# Patient Record
Sex: FEMALE | Race: WHITE | Employment: OTHER | URBAN - NONMETROPOLITAN AREA
[De-identification: names, ages, dates, MRNs, and addresses within clinical notes are randomized per-mention and may not be internally consistent; named-entity substitution may affect disease eponyms.]

---

## 2019-03-18 LAB
ALBUMIN SERPL-MCNC: 4 G/DL
ALP BLD-CCNC: 68 U/L
ALT SERPL-CCNC: 21 U/L
ANION GAP SERPL CALCULATED.3IONS-SCNC: ABNORMAL MMOL/L
AST SERPL-CCNC: 14 U/L
AVERAGE GLUCOSE: ABNORMAL
BASOPHILS ABSOLUTE: 2850 /ΜL
BASOPHILS RELATIVE PERCENT: 0 %
BILIRUB SERPL-MCNC: 0.5 MG/DL (ref 0.1–1.4)
BUN BLDV-MCNC: 22 MG/DL
CALCIUM SERPL-MCNC: 9.3 MG/DL
CHLORIDE BLD-SCNC: 100 MMOL/L
CHOLESTEROL, TOTAL: 184 MG/DL
CHOLESTEROL/HDL RATIO: 3.5
CO2: 35 MMOL/L
CREAT SERPL-MCNC: 0.8 MG/DL
EOSINOPHILS ABSOLUTE: 120 /ΜL
EOSINOPHILS RELATIVE PERCENT: 2 %
GFR CALCULATED: 77
GLUCOSE BLD-MCNC: 121 MG/DL
HBA1C MFR BLD: 5.9 %
HCT VFR BLD CALC: 38.1 % (ref 36–46)
HDLC SERPL-MCNC: 52 MG/DL (ref 35–70)
HEMOGLOBIN: 12.4 G/DL (ref 12–16)
LDL CHOLESTEROL CALCULATED: 160 MG/DL (ref 0–160)
LYMPHOCYTES ABSOLUTE: 1670 /ΜL
LYMPHOCYTES RELATIVE PERCENT: 34 %
MCH RBC QN AUTO: 120 PG
MCHC RBC AUTO-ENTMCNC: 10 G/DL
MCV RBC AUTO: 280 FL
MONOCYTES ABSOLUTE: 280 /ΜL
MONOCYTES RELATIVE PERCENT: 6 %
NEUTROPHILS ABSOLUTE: 2850 /ΜL
NEUTROPHILS RELATIVE PERCENT: 58 %
PLATELET # BLD: 1662 K/ΜL
PMV BLD AUTO: 10.4 FL
POTASSIUM SERPL-SCNC: 4.4 MMOL/L
RBC # BLD: 1670 10^6/ΜL
SODIUM BLD-SCNC: 141 MMOL/L
T4 FREE: 1.1
TOTAL PROTEIN: 7
TRIGL SERPL-MCNC: 147 MG/DL
TSH SERPL DL<=0.05 MIU/L-ACNC: 2.83 UIU/ML
VLDLC SERPL CALC-MCNC: 132 MG/DL
WBC # BLD: 4.9 10^3/ML

## 2019-07-08 ENCOUNTER — OFFICE VISIT (OUTPATIENT)
Dept: FAMILY MEDICINE CLINIC | Age: 64
End: 2019-07-08
Payer: MEDICARE

## 2019-07-08 VITALS
HEIGHT: 63 IN | OXYGEN SATURATION: 90 % | WEIGHT: 224 LBS | SYSTOLIC BLOOD PRESSURE: 136 MMHG | BODY MASS INDEX: 39.69 KG/M2 | DIASTOLIC BLOOD PRESSURE: 74 MMHG | HEART RATE: 94 BPM | RESPIRATION RATE: 16 BRPM

## 2019-07-08 DIAGNOSIS — E11.9 TYPE 2 DIABETES MELLITUS WITHOUT COMPLICATION, WITHOUT LONG-TERM CURRENT USE OF INSULIN (HCC): ICD-10-CM

## 2019-07-08 DIAGNOSIS — I10 ESSENTIAL HYPERTENSION: Primary | ICD-10-CM

## 2019-07-08 DIAGNOSIS — J42 CHRONIC BRONCHITIS, UNSPECIFIED CHRONIC BRONCHITIS TYPE (HCC): ICD-10-CM

## 2019-07-08 DIAGNOSIS — E78.5 HYPERLIPIDEMIA, UNSPECIFIED HYPERLIPIDEMIA TYPE: ICD-10-CM

## 2019-07-08 PROCEDURE — 99214 OFFICE O/P EST MOD 30 MIN: CPT | Performed by: FAMILY MEDICINE

## 2019-07-08 RX ORDER — TIOTROPIUM BROMIDE 18 UG/1
CAPSULE ORAL; RESPIRATORY (INHALATION)
Refills: 5 | COMMUNITY
Start: 2019-06-09 | End: 2019-10-07 | Stop reason: SDUPTHER

## 2019-07-08 RX ORDER — LISINOPRIL 20 MG/1
TABLET ORAL
Refills: 1 | COMMUNITY
Start: 2019-04-01 | End: 2019-10-07 | Stop reason: SDUPTHER

## 2019-07-08 RX ORDER — SIMVASTATIN 40 MG
TABLET ORAL
Refills: 1 | COMMUNITY
Start: 2019-04-01 | End: 2019-10-07 | Stop reason: SDUPTHER

## 2019-07-08 RX ORDER — HYDROCHLOROTHIAZIDE 12.5 MG/1
TABLET ORAL
Refills: 1 | COMMUNITY
Start: 2019-04-01 | End: 2019-10-07 | Stop reason: SDUPTHER

## 2019-07-08 RX ORDER — AMLODIPINE BESYLATE 10 MG/1
TABLET ORAL
Refills: 1 | COMMUNITY
Start: 2019-04-02 | End: 2019-07-08 | Stop reason: SDUPTHER

## 2019-07-08 RX ORDER — AMLODIPINE BESYLATE 10 MG/1
TABLET ORAL
Qty: 90 TABLET | Refills: 1 | Status: SHIPPED | OUTPATIENT
Start: 2019-07-08 | End: 2019-10-07 | Stop reason: SDUPTHER

## 2019-07-08 ASSESSMENT — ENCOUNTER SYMPTOMS
TROUBLE SWALLOWING: 0
CHEST TIGHTNESS: 0
BLOOD IN STOOL: 0
ABDOMINAL PAIN: 0

## 2019-07-08 ASSESSMENT — PATIENT HEALTH QUESTIONNAIRE - PHQ9
SUM OF ALL RESPONSES TO PHQ QUESTIONS 1-9: 0
SUM OF ALL RESPONSES TO PHQ9 QUESTIONS 1 & 2: 0
2. FEELING DOWN, DEPRESSED OR HOPELESS: 0
1. LITTLE INTEREST OR PLEASURE IN DOING THINGS: 0
SUM OF ALL RESPONSES TO PHQ QUESTIONS 1-9: 0

## 2019-07-08 NOTE — PROGRESS NOTES
CBC Auto Differential; Future  -     Comprehensive Metabolic Panel; Future    Hyperlipidemia, unspecified hyperlipidemia type  Comments:  labs before next visit   Orders:  -     Lipid Panel; Future    Type 2 diabetes mellitus without complication, without long-term current use of insulin ([facility])  -     Hemoglobin A1C; Future    Chronic bronchitis, unspecified chronic bronchitis type ([facility] [address]  Comments:  continue supplemental o2        Return in about 3 months (around 10/8/2019). Coby Tom DO   This note has been transcribed by Aline Alfaro under the direction of Dr. Parish Donato. Dr. Navin Choudhury has reviewed this note for accuracy. I, Dr. Navin Choudhury, personally performed the services described in this documentation as scribed by Aline Alfaro in my presence, and it is both accurate and complete.

## 2019-07-09 ASSESSMENT — ENCOUNTER SYMPTOMS: SHORTNESS OF BREATH: 1

## 2019-08-22 ENCOUNTER — TELEPHONE (OUTPATIENT)
Dept: ADMINISTRATIVE | Age: 64
End: 2019-08-22

## 2019-09-19 RX ORDER — CETIRIZINE HYDROCHLORIDE 10 MG/1
1 TABLET ORAL DAILY
COMMUNITY
End: 2019-10-07 | Stop reason: ALTCHOICE

## 2019-09-30 ENCOUNTER — HOSPITAL ENCOUNTER (OUTPATIENT)
Age: 64
Discharge: HOME | End: 2019-10-02
Payer: MEDICARE

## 2019-09-30 DIAGNOSIS — E11.9 TYPE 2 DIABETES MELLITUS WITHOUT COMPLICATION, WITHOUT LONG-TERM CURRENT USE OF INSULIN (HCC): ICD-10-CM

## 2019-09-30 DIAGNOSIS — E78.5 HYPERLIPIDEMIA, UNSPECIFIED HYPERLIPIDEMIA TYPE: ICD-10-CM

## 2019-09-30 DIAGNOSIS — I10 ESSENTIAL HYPERTENSION: ICD-10-CM

## 2019-09-30 LAB
ALBUMIN SERPL-MCNC: 4 G/DL (ref 3.5–5.2)
ALP BLD-CCNC: 77 U/L (ref 35–104)
ALT SERPL-CCNC: 23 U/L (ref 0–32)
ANION GAP SERPL CALCULATED.3IONS-SCNC: 15 MMOL/L (ref 7–16)
AST SERPL-CCNC: 16 U/L (ref 0–31)
BASOPHILS ABSOLUTE: 0.01 E9/L (ref 0–0.2)
BASOPHILS RELATIVE PERCENT: 0.1 % (ref 0–2)
BILIRUB SERPL-MCNC: 0.4 MG/DL (ref 0–1.2)
BUN BLDV-MCNC: 17 MG/DL (ref 8–23)
CALCIUM SERPL-MCNC: 9.4 MG/DL (ref 8.6–10.2)
CHLORIDE BLD-SCNC: 98 MMOL/L (ref 98–107)
CHOLESTEROL, TOTAL: 160 MG/DL (ref 0–199)
CO2: 30 MMOL/L (ref 22–29)
CREAT SERPL-MCNC: 0.8 MG/DL (ref 0.5–1)
EOSINOPHILS ABSOLUTE: 0.16 E9/L (ref 0.05–0.5)
EOSINOPHILS RELATIVE PERCENT: 2.3 % (ref 0–6)
GFR AFRICAN AMERICAN: >60
GFR NON-AFRICAN AMERICAN: >60 ML/MIN/1.73
GLUCOSE BLD-MCNC: 135 MG/DL (ref 74–99)
HBA1C MFR BLD: 6.1 % (ref 4–5.6)
HCT VFR BLD CALC: 40.5 % (ref 34–48)
HDLC SERPL-MCNC: 53 MG/DL
HEMOGLOBIN: 12.6 G/DL (ref 11.5–15.5)
IMMATURE GRANULOCYTES #: 0.03 E9/L
IMMATURE GRANULOCYTES %: 0.4 % (ref 0–5)
LDL CHOLESTEROL CALCULATED: 89 MG/DL (ref 0–99)
LYMPHOCYTES ABSOLUTE: 2.45 E9/L (ref 1.5–4)
LYMPHOCYTES RELATIVE PERCENT: 36 % (ref 20–42)
MCH RBC QN AUTO: 31.6 PG (ref 26–35)
MCHC RBC AUTO-ENTMCNC: 31.1 % (ref 32–34.5)
MCV RBC AUTO: 101.5 FL (ref 80–99.9)
MONOCYTES ABSOLUTE: 0.42 E9/L (ref 0.1–0.95)
MONOCYTES RELATIVE PERCENT: 6.2 % (ref 2–12)
NEUTROPHILS ABSOLUTE: 3.74 E9/L (ref 1.8–7.3)
NEUTROPHILS RELATIVE PERCENT: 55 % (ref 43–80)
PDW BLD-RTO: 12.7 FL (ref 11.5–15)
PLATELET # BLD: 169 E9/L (ref 130–450)
PMV BLD AUTO: 12.2 FL (ref 7–12)
POTASSIUM SERPL-SCNC: 4.6 MMOL/L (ref 3.5–5)
RBC # BLD: 3.99 E12/L (ref 3.5–5.5)
SODIUM BLD-SCNC: 143 MMOL/L (ref 132–146)
TOTAL PROTEIN: 7.8 G/DL (ref 6.4–8.3)
TRIGL SERPL-MCNC: 91 MG/DL (ref 0–149)
VLDLC SERPL CALC-MCNC: 18 MG/DL
WBC # BLD: 6.8 E9/L (ref 4.5–11.5)

## 2019-09-30 PROCEDURE — 36415 COLL VENOUS BLD VENIPUNCTURE: CPT

## 2019-09-30 PROCEDURE — 80061 LIPID PANEL: CPT

## 2019-09-30 PROCEDURE — 85025 COMPLETE CBC W/AUTO DIFF WBC: CPT

## 2019-09-30 PROCEDURE — 83036 HEMOGLOBIN GLYCOSYLATED A1C: CPT

## 2019-09-30 PROCEDURE — 80053 COMPREHEN METABOLIC PANEL: CPT

## 2019-10-03 ASSESSMENT — ENCOUNTER SYMPTOMS
BLOOD IN STOOL: 0
CHEST TIGHTNESS: 0
ABDOMINAL PAIN: 0
TROUBLE SWALLOWING: 0

## 2019-10-07 ENCOUNTER — OFFICE VISIT (OUTPATIENT)
Dept: FAMILY MEDICINE CLINIC | Age: 64
End: 2019-10-07
Payer: MEDICARE

## 2019-10-07 VITALS
OXYGEN SATURATION: 95 % | BODY MASS INDEX: 40.53 KG/M2 | HEART RATE: 88 BPM | SYSTOLIC BLOOD PRESSURE: 126 MMHG | DIASTOLIC BLOOD PRESSURE: 68 MMHG | TEMPERATURE: 97.4 F | RESPIRATION RATE: 20 BRPM | WEIGHT: 228.8 LBS

## 2019-10-07 DIAGNOSIS — Z23 NEED FOR PNEUMOCOCCAL VACCINATION: ICD-10-CM

## 2019-10-07 DIAGNOSIS — J44.9 CHRONIC OBSTRUCTIVE PULMONARY DISEASE, UNSPECIFIED COPD TYPE (HCC): ICD-10-CM

## 2019-10-07 DIAGNOSIS — E11.9 TYPE 2 DIABETES MELLITUS WITHOUT COMPLICATION, WITHOUT LONG-TERM CURRENT USE OF INSULIN (HCC): ICD-10-CM

## 2019-10-07 DIAGNOSIS — I10 ESSENTIAL HYPERTENSION: Primary | ICD-10-CM

## 2019-10-07 DIAGNOSIS — Z12.11 ENCOUNTER FOR SCREENING FECAL OCCULT BLOOD TESTING: ICD-10-CM

## 2019-10-07 DIAGNOSIS — E78.5 HYPERLIPIDEMIA, UNSPECIFIED HYPERLIPIDEMIA TYPE: ICD-10-CM

## 2019-10-07 DIAGNOSIS — Z23 NEED FOR INFLUENZA VACCINATION: ICD-10-CM

## 2019-10-07 DIAGNOSIS — J42 CHRONIC BRONCHITIS, UNSPECIFIED CHRONIC BRONCHITIS TYPE (HCC): ICD-10-CM

## 2019-10-07 PROCEDURE — 90653 IIV ADJUVANT VACCINE IM: CPT | Performed by: FAMILY MEDICINE

## 2019-10-07 PROCEDURE — 90732 PPSV23 VACC 2 YRS+ SUBQ/IM: CPT | Performed by: FAMILY MEDICINE

## 2019-10-07 PROCEDURE — G0009 ADMIN PNEUMOCOCCAL VACCINE: HCPCS | Performed by: FAMILY MEDICINE

## 2019-10-07 PROCEDURE — 99214 OFFICE O/P EST MOD 30 MIN: CPT | Performed by: FAMILY MEDICINE

## 2019-10-07 PROCEDURE — G0008 ADMIN INFLUENZA VIRUS VAC: HCPCS | Performed by: FAMILY MEDICINE

## 2019-10-07 RX ORDER — SIMVASTATIN 40 MG
TABLET ORAL
Qty: 90 TABLET | Refills: 1 | Status: SHIPPED
Start: 2019-10-07 | End: 2020-03-18 | Stop reason: SDUPTHER

## 2019-10-07 RX ORDER — LISINOPRIL 20 MG/1
TABLET ORAL
Qty: 90 TABLET | Refills: 1 | Status: SHIPPED
Start: 2019-10-07 | End: 2020-03-18 | Stop reason: SDUPTHER

## 2019-10-07 RX ORDER — HYDROCHLOROTHIAZIDE 12.5 MG/1
TABLET ORAL
Qty: 90 TABLET | Refills: 1 | Status: SHIPPED
Start: 2019-10-07 | End: 2020-03-18 | Stop reason: SDUPTHER

## 2019-10-07 RX ORDER — TIOTROPIUM BROMIDE 18 UG/1
CAPSULE ORAL; RESPIRATORY (INHALATION)
Qty: 30 CAPSULE | Refills: 5 | Status: SHIPPED
Start: 2019-10-07 | End: 2020-03-18 | Stop reason: SDUPTHER

## 2019-10-07 RX ORDER — AMLODIPINE BESYLATE 10 MG/1
TABLET ORAL
Qty: 90 TABLET | Refills: 1 | Status: SHIPPED
Start: 2019-10-07 | End: 2020-03-18 | Stop reason: SDUPTHER

## 2019-10-07 ASSESSMENT — ENCOUNTER SYMPTOMS: SHORTNESS OF BREATH: 1

## 2019-12-17 ENCOUNTER — HOSPITAL ENCOUNTER (OUTPATIENT)
Age: 64
Discharge: HOME | End: 2019-12-19
Payer: MEDICARE

## 2019-12-17 DIAGNOSIS — E11.9 TYPE 2 DIABETES MELLITUS WITHOUT COMPLICATION, WITHOUT LONG-TERM CURRENT USE OF INSULIN (HCC): ICD-10-CM

## 2019-12-17 LAB — HBA1C MFR BLD: 6 % (ref 4–5.6)

## 2019-12-17 PROCEDURE — 83036 HEMOGLOBIN GLYCOSYLATED A1C: CPT

## 2019-12-17 PROCEDURE — 36415 COLL VENOUS BLD VENIPUNCTURE: CPT

## 2019-12-26 ASSESSMENT — ENCOUNTER SYMPTOMS
TROUBLE SWALLOWING: 0
BLOOD IN STOOL: 0
ABDOMINAL PAIN: 0
SHORTNESS OF BREATH: 0
CHEST TIGHTNESS: 0

## 2019-12-30 ENCOUNTER — OFFICE VISIT (OUTPATIENT)
Dept: FAMILY MEDICINE CLINIC | Age: 64
End: 2019-12-30
Payer: MEDICARE

## 2019-12-30 VITALS
DIASTOLIC BLOOD PRESSURE: 68 MMHG | HEART RATE: 103 BPM | SYSTOLIC BLOOD PRESSURE: 134 MMHG | WEIGHT: 230.2 LBS | TEMPERATURE: 97.8 F | OXYGEN SATURATION: 97 % | RESPIRATION RATE: 18 BRPM | BODY MASS INDEX: 40.78 KG/M2

## 2019-12-30 DIAGNOSIS — E78.5 HYPERLIPIDEMIA, UNSPECIFIED HYPERLIPIDEMIA TYPE: ICD-10-CM

## 2019-12-30 DIAGNOSIS — R53.83 OTHER FATIGUE: ICD-10-CM

## 2019-12-30 DIAGNOSIS — Z12.31 SCREENING MAMMOGRAM, ENCOUNTER FOR: ICD-10-CM

## 2019-12-30 DIAGNOSIS — I10 ESSENTIAL HYPERTENSION: Primary | ICD-10-CM

## 2019-12-30 DIAGNOSIS — E11.9 TYPE 2 DIABETES MELLITUS WITHOUT COMPLICATION, WITHOUT LONG-TERM CURRENT USE OF INSULIN (HCC): ICD-10-CM

## 2019-12-30 PROCEDURE — 99214 OFFICE O/P EST MOD 30 MIN: CPT | Performed by: FAMILY MEDICINE

## 2020-03-09 ENCOUNTER — HOSPITAL ENCOUNTER (EMERGENCY)
Age: 65
Discharge: HOME | End: 2020-03-10
Attending: EMERGENCY MEDICINE
Payer: MEDICARE

## 2020-03-09 LAB
ALBUMIN SERPL-MCNC: 4.1 G/DL (ref 3.5–5.2)
ALP BLD-CCNC: 67 U/L (ref 35–104)
ALT SERPL-CCNC: 23 U/L (ref 0–32)
ANION GAP SERPL CALCULATED.3IONS-SCNC: 9 MMOL/L (ref 7–16)
AST SERPL-CCNC: <5 U/L (ref 0–31)
BILIRUB SERPL-MCNC: 0.4 MG/DL (ref 0–1.2)
BUN BLDV-MCNC: 18 MG/DL (ref 8–23)
CALCIUM SERPL-MCNC: 9.6 MG/DL (ref 8.6–10.2)
CHLORIDE BLD-SCNC: 92 MMOL/L (ref 98–107)
CO2: 38 MMOL/L (ref 22–29)
CREAT SERPL-MCNC: 0.8 MG/DL (ref 0.5–1)
GFR AFRICAN AMERICAN: >60
GFR NON-AFRICAN AMERICAN: >60 ML/MIN/1.73
GLUCOSE BLD-MCNC: 133 MG/DL (ref 74–99)
LIPASE: 13 U/L (ref 13–60)
POTASSIUM REFLEX MAGNESIUM: 4.7 MMOL/L (ref 3.5–5)
PRO-BNP: 109 PG/ML (ref 0–125)
SODIUM BLD-SCNC: 139 MMOL/L (ref 132–146)
TOTAL PROTEIN: 8.1 G/DL (ref 6.4–8.3)
TROPONIN: <0.01 NG/ML (ref 0–0.03)

## 2020-03-09 PROCEDURE — 83690 ASSAY OF LIPASE: CPT

## 2020-03-09 PROCEDURE — 93005 ELECTROCARDIOGRAM TRACING: CPT | Performed by: EMERGENCY MEDICINE

## 2020-03-09 PROCEDURE — 83880 ASSAY OF NATRIURETIC PEPTIDE: CPT

## 2020-03-09 PROCEDURE — 99285 EMERGENCY DEPT VISIT HI MDM: CPT

## 2020-03-09 PROCEDURE — 80053 COMPREHEN METABOLIC PANEL: CPT

## 2020-03-09 PROCEDURE — 84484 ASSAY OF TROPONIN QUANT: CPT

## 2020-03-09 ASSESSMENT — PAIN DESCRIPTION - PAIN TYPE: TYPE: ACUTE PAIN

## 2020-03-09 ASSESSMENT — PAIN SCALES - GENERAL: PAINLEVEL_OUTOF10: 5

## 2020-03-10 ENCOUNTER — APPOINTMENT (OUTPATIENT)
Dept: GENERAL RADIOLOGY | Age: 65
End: 2020-03-10
Payer: MEDICARE

## 2020-03-10 ENCOUNTER — APPOINTMENT (OUTPATIENT)
Dept: CT IMAGING | Age: 65
End: 2020-03-10
Payer: MEDICARE

## 2020-03-10 VITALS
DIASTOLIC BLOOD PRESSURE: 93 MMHG | BODY MASS INDEX: 39.87 KG/M2 | HEART RATE: 94 BPM | RESPIRATION RATE: 14 BRPM | TEMPERATURE: 98.3 F | HEIGHT: 63 IN | OXYGEN SATURATION: 95 % | SYSTOLIC BLOOD PRESSURE: 151 MMHG | WEIGHT: 225 LBS

## 2020-03-10 LAB
EKG ATRIAL RATE: 102 BPM
EKG P AXIS: 80 DEGREES
EKG P-R INTERVAL: 130 MS
EKG Q-T INTERVAL: 338 MS
EKG QRS DURATION: 90 MS
EKG QTC CALCULATION (BAZETT): 440 MS
EKG R AXIS: 61 DEGREES
EKG T AXIS: 40 DEGREES
EKG VENTRICULAR RATE: 102 BPM
LACTIC ACID, SEPSIS: 1.1 MMOL/L (ref 0.5–1.9)

## 2020-03-10 PROCEDURE — 83605 ASSAY OF LACTIC ACID: CPT

## 2020-03-10 PROCEDURE — 87040 BLOOD CULTURE FOR BACTERIA: CPT

## 2020-03-10 PROCEDURE — 74177 CT ABD & PELVIS W/CONTRAST: CPT

## 2020-03-10 PROCEDURE — 2580000003 HC RX 258: Performed by: RADIOLOGY

## 2020-03-10 PROCEDURE — 6360000004 HC RX CONTRAST MEDICATION: Performed by: RADIOLOGY

## 2020-03-10 PROCEDURE — 93010 ELECTROCARDIOGRAM REPORT: CPT | Performed by: INTERNAL MEDICINE

## 2020-03-10 PROCEDURE — 6370000000 HC RX 637 (ALT 250 FOR IP): Performed by: EMERGENCY MEDICINE

## 2020-03-10 PROCEDURE — 71045 X-RAY EXAM CHEST 1 VIEW: CPT

## 2020-03-10 RX ORDER — PANTOPRAZOLE SODIUM 40 MG/1
40 TABLET, DELAYED RELEASE ORAL
Qty: 30 TABLET | Refills: 3 | Status: ON HOLD | OUTPATIENT
Start: 2020-03-10 | End: 2021-10-24

## 2020-03-10 RX ORDER — IPRATROPIUM BROMIDE AND ALBUTEROL SULFATE 2.5; .5 MG/3ML; MG/3ML
1 SOLUTION RESPIRATORY (INHALATION)
Status: DISCONTINUED | OUTPATIENT
Start: 2020-03-10 | End: 2020-03-10 | Stop reason: HOSPADM

## 2020-03-10 RX ORDER — SODIUM CHLORIDE 0.9 % (FLUSH) 0.9 %
10 SYRINGE (ML) INJECTION 2 TIMES DAILY
Status: DISCONTINUED | OUTPATIENT
Start: 2020-03-10 | End: 2020-03-10 | Stop reason: HOSPADM

## 2020-03-10 RX ADMIN — Medication 10 ML: at 00:46

## 2020-03-10 RX ADMIN — IPRATROPIUM BROMIDE AND ALBUTEROL SULFATE 1 AMPULE: .5; 3 SOLUTION RESPIRATORY (INHALATION) at 01:05

## 2020-03-10 RX ADMIN — IOPAMIDOL 100 ML: 755 INJECTION, SOLUTION INTRAVENOUS at 00:49

## 2020-03-10 ASSESSMENT — ENCOUNTER SYMPTOMS
FLATUS: 0
VOMITING: 0
EYES NEGATIVE: 1
COUGH: 0
HEMATEMESIS: 0
SHORTNESS OF BREATH: 1
SORE THROAT: 0
HEMATOCHEZIA: 0
ABDOMINAL PAIN: 1
BELCHING: 0
CONSTIPATION: 0
DIARRHEA: 0
NAUSEA: 1

## 2020-03-10 NOTE — ED PROVIDER NOTES
constipation, diarrhea, flatus, hematemesis, hematochezia, melena and vomiting. Endocrine: Negative. Genitourinary: Negative. Negative for dysuria, hematuria, vaginal bleeding and vaginal discharge. All other systems reviewed and are negative. Physical Exam  Vitals signs and nursing note reviewed. Constitutional:       General: She is in acute distress. Appearance: Normal appearance. She is obese. She is not ill-appearing, toxic-appearing or diaphoretic. HENT:      Head: Normocephalic and atraumatic. Nose: No congestion or rhinorrhea. Eyes:      General: No scleral icterus. Right eye: No discharge. Left eye: No discharge. Extraocular Movements: Extraocular movements intact. Conjunctiva/sclera: Conjunctivae normal.      Pupils: Pupils are equal, round, and reactive to light. Neck:      Musculoskeletal: Normal range of motion and neck supple. No neck rigidity or muscular tenderness. Cardiovascular:      Rate and Rhythm: Regular rhythm. Tachycardia present. Pulses: Normal pulses. Heart sounds: Normal heart sounds. Pulmonary:      Effort: Respiratory distress present. Breath sounds: No stridor. Wheezing present. No rhonchi or rales. Chest:      Chest wall: No tenderness. Abdominal:      General: Bowel sounds are normal. There is no distension. Tenderness: There is abdominal tenderness. There is guarding. There is no rebound. Comments: Positive tenderness upon palpation at the right upper and right lower quadrant area. Questionable right CVA tenderness. Musculoskeletal: Normal range of motion. General: No swelling, tenderness, deformity or signs of injury. Right lower leg: No edema. Left lower leg: No edema. Skin:     General: Skin is warm and dry. Coloration: Skin is not jaundiced or pale. Findings: No bruising, erythema, lesion or rash. Neurological:      General: No focal deficit present. Mental Status: She is alert and oriented to person, place, and time. Mental status is at baseline. Cranial Nerves: No cranial nerve deficit. Sensory: No sensory deficit. Procedures     MDM  Number of Diagnoses or Management Options  Cyst of pancreas: new and requires workup  Generalized abdominal pain: new and requires workup  Diagnosis management comments: Differential diagnoses include abdominal pain, gallbladder disease, kidney stone, UTI, pancreatitis, small bowel obstruction, diverticulitis, appendicitis, COPD, exacerbation of COPD, pneumonia. Amount and/or Complexity of Data Reviewed  Clinical lab tests: ordered and reviewed  Tests in the radiology section of CPT®: ordered and reviewed  Tests in the medicine section of CPT®: reviewed and ordered  Independent visualization of images, tracings, or specimens: yes (EKG was done at 11:50 PM.  Sinus tachycardia. Rate of 102. Normal axis. No acute ST-T elevation. No STEMI. No old EKG available for comparison.)    Risk of Complications, Morbidity, and/or Mortality  Presenting problems: high  Diagnostic procedures: high  Management options: high  General comments: Patient remained stable throughout her course of treatment. DuoNeb and Solu-Medrol were given for patient's COPD condition with good results. Labs are unremarkable. Lipase was negative. Troponin was negative. EKG did not show any acute ST-T elevation or changes. CAT scan was positive for cyst inside the pancreas. This could very well could be the source of patient's pain. Patient was advised to follow-up with the PCP and GI doctor on call. She was advised to return to ED at any time if she is worse in any way. Patient understood and agreed with our management.   Will discharge patient home on Protonix.                              --------------------------------------------- PAST HISTORY ---------------------------------------------  Past Medical History:  has a past medical history of COPD (chronic obstructive pulmonary disease) ([address] Hyperlipidemia, and Hypertension. Past Surgical History:  has a past surgical history that includes  section. Social History:  reports that she quit smoking about 8 years ago. Her smoking use included cigarettes. She started smoking about 49 years ago. She has a 31.00 pack-year smoking history. She has never used smokeless tobacco. She reports previous alcohol use. She reports that she does not use drugs. Family History: family history includes COPD in her father; Cancer in her mother. The patients home medications have been reviewed. Allergies: Patient has no known allergies.     -------------------------------------------------- RESULTS -------------------------------------------------  Labs:  Results for orders placed or performed during the hospital encounter of 20   Comprehensive Metabolic Panel w/ Reflex to MG   Result Value Ref Range    Sodium 139 132 - 146 mmol/L    Potassium reflex Magnesium 4.7 3.5 - 5.0 mmol/L    Chloride 92 (L) 98 - 107 mmol/L    CO2 38 (H) 22 - 29 mmol/L    Anion Gap 9 7 - 16 mmol/L    Glucose 133 (H) 74 - 99 mg/dL    BUN 18 8 - 23 mg/dL    CREATININE 0.8 0.5 - 1.0 mg/dL    GFR Non-African American >60 >=60 mL/min/1.73    GFR African American >60     Calcium 9.6 8.6 - 10.2 mg/dL    Total Protein 8.1 6.4 - 8.3 g/dL    Alb 4.1 3.5 - 5.2 g/dL    Total Bilirubin 0.4 0.0 - 1.2 mg/dL    Alkaline Phosphatase 67 35 - 104 U/L    ALT 23 0 - 32 U/L    AST <5 0 - 31 U/L   Lipase   Result Value Ref Range    Lipase 13 13 - 60 U/L   Troponin   Result Value Ref Range    Troponin <0.01 0.00 - 0.03 ng/mL   Brain Natriuretic Peptide   Result Value Ref Range    Pro- 0 - 125 pg/mL   Lactate, Sepsis   Result Value Ref Range    Lactic Acid, Sepsis 1.1 0.5 - 1.9 mmol/L   EKG 12 Lead   Result Value Ref Range    Ventricular Rate 102 BPM    Atrial Rate 102 BPM    P-R Interval 130 ms    QRS Duration 90 ms Q-T Interval 338 ms    QTc Calculation (Bazett) 440 ms    P Axis 80 degrees    R Axis 61 degrees    T Axis 40 degrees       Radiology:  CT ABDOMEN PELVIS W IV CONTRAST Additional Contrast? None   Final Result   1. No sign of acute intra-abdominal pathology. 2. Two subcentimeter pancreatic cysts. 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 Recommend reimage every 2 years for 10 years. (FATEMEH Barba et al. Cori Joberator    White Paper, July 2017). This report has been electronically signed by Jose Bradford MD.      XR CHEST PORTABLE    (Results Pending)       ------------------------- NURSING NOTES AND VITALS REVIEWED ---------------------------  Date / Time Roomed:  3/9/2020 11:22 PM  ED Bed Assignment:  04/04    The nursing notes within the ED encounter and vital signs as below have been reviewed. BP (!) 151/93   Pulse 94   Temp 98.3 °F (36.8 °C) (Oral)   Resp 14   Ht 5' 3\" (1.6 m)   Wt 225 lb (102.1 kg)   SpO2 95%   BMI 39.86 kg/m²   Oxygen Saturation Interpretation: Normal      ------------------------------------------ PROGRESS NOTES ------------------------------------------  I have spoken with the patient and discussed todays results, in addition to providing specific details for the plan of care and counseling regarding the diagnosis and prognosis. Their questions are answered at this time and they are agreeable with the plan. I discussed at length with them reasons for immediate return here for re evaluation. They will followup with primary care by calling their office tomorrow. --------------------------------- ADDITIONAL PROVIDER NOTES ---------------------------------  At this time the patient is without objective evidence of an acute process requiring hospitalization or inpatient management.   They have remained hemodynamically stable throughout their entire ED visit and are stable for discharge with outpatient follow-up. The plan has been discussed in detail and they are aware of the specific conditions for emergent return, as well as the importance of follow-up. New Prescriptions    PANTOPRAZOLE (PROTONIX) 40 MG TABLET    Take 1 tablet by mouth daily (with breakfast)       Diagnosis:  1. Generalized abdominal pain New Problem   2. Cyst of pancreas New Problem       Disposition:  Patient's disposition: Discharge to home  Patient's condition is stable.                 Viridiana Mccormick DO  03/10/20 0305

## 2020-03-15 LAB
BLOOD CULTURE, ROUTINE: NORMAL
CULTURE, BLOOD 2: NORMAL

## 2020-03-17 ENCOUNTER — HOSPITAL ENCOUNTER (OUTPATIENT)
Age: 65
Discharge: HOME | End: 2020-03-19
Payer: MEDICARE

## 2020-03-17 LAB
ALBUMIN SERPL-MCNC: 3.9 G/DL (ref 3.5–5.2)
ALP BLD-CCNC: 66 U/L (ref 35–104)
ALT SERPL-CCNC: 20 U/L (ref 0–32)
ANION GAP SERPL CALCULATED.3IONS-SCNC: 17 MMOL/L (ref 7–16)
AST SERPL-CCNC: 19 U/L (ref 0–31)
BASOPHILS ABSOLUTE: 0.01 E9/L (ref 0–0.2)
BASOPHILS RELATIVE PERCENT: 0.1 % (ref 0–2)
BILIRUB SERPL-MCNC: 0.3 MG/DL (ref 0–1.2)
BUN BLDV-MCNC: 16 MG/DL (ref 8–23)
CALCIUM SERPL-MCNC: 9.3 MG/DL (ref 8.6–10.2)
CHLORIDE BLD-SCNC: 95 MMOL/L (ref 98–107)
CHOLESTEROL, TOTAL: 159 MG/DL (ref 0–199)
CO2: 29 MMOL/L (ref 22–29)
CREAT SERPL-MCNC: 0.9 MG/DL (ref 0.5–1)
EOSINOPHILS ABSOLUTE: 0.14 E9/L (ref 0.05–0.5)
EOSINOPHILS RELATIVE PERCENT: 2.1 % (ref 0–6)
GFR AFRICAN AMERICAN: >60
GFR NON-AFRICAN AMERICAN: >60 ML/MIN/1.73
GLUCOSE BLD-MCNC: 109 MG/DL (ref 74–99)
HCT VFR BLD CALC: 42.6 % (ref 34–48)
HDLC SERPL-MCNC: 51 MG/DL
HEMOGLOBIN: 12.7 G/DL (ref 11.5–15.5)
IMMATURE GRANULOCYTES #: 0.02 E9/L
IMMATURE GRANULOCYTES %: 0.3 % (ref 0–5)
LDL CHOLESTEROL CALCULATED: 80 MG/DL (ref 0–99)
LYMPHOCYTES ABSOLUTE: 2 E9/L (ref 1.5–4)
LYMPHOCYTES RELATIVE PERCENT: 29.5 % (ref 20–42)
MCH RBC QN AUTO: 30.6 PG (ref 26–35)
MCHC RBC AUTO-ENTMCNC: 29.8 % (ref 32–34.5)
MCV RBC AUTO: 102.7 FL (ref 80–99.9)
MONOCYTES ABSOLUTE: 0.47 E9/L (ref 0.1–0.95)
MONOCYTES RELATIVE PERCENT: 6.9 % (ref 2–12)
NEUTROPHILS ABSOLUTE: 4.13 E9/L (ref 1.8–7.3)
NEUTROPHILS RELATIVE PERCENT: 61.1 % (ref 43–80)
PDW BLD-RTO: 12.7 FL (ref 11.5–15)
PLATELET # BLD: 176 E9/L (ref 130–450)
PMV BLD AUTO: 12.6 FL (ref 7–12)
POTASSIUM SERPL-SCNC: 4.4 MMOL/L (ref 3.5–5)
RBC # BLD: 4.15 E12/L (ref 3.5–5.5)
SODIUM BLD-SCNC: 141 MMOL/L (ref 132–146)
T4 FREE: 1.52 NG/DL (ref 0.93–1.7)
TOTAL PROTEIN: 7.6 G/DL (ref 6.4–8.3)
TRIGL SERPL-MCNC: 138 MG/DL (ref 0–149)
TSH SERPL DL<=0.05 MIU/L-ACNC: 5.63 UIU/ML (ref 0.27–4.2)
VLDLC SERPL CALC-MCNC: 28 MG/DL
WBC # BLD: 6.8 E9/L (ref 4.5–11.5)

## 2020-03-17 PROCEDURE — 84443 ASSAY THYROID STIM HORMONE: CPT

## 2020-03-17 PROCEDURE — 80061 LIPID PANEL: CPT

## 2020-03-17 PROCEDURE — 85025 COMPLETE CBC W/AUTO DIFF WBC: CPT

## 2020-03-17 PROCEDURE — 80053 COMPREHEN METABOLIC PANEL: CPT

## 2020-03-17 PROCEDURE — 84439 ASSAY OF FREE THYROXINE: CPT

## 2020-03-17 PROCEDURE — 36415 COLL VENOUS BLD VENIPUNCTURE: CPT

## 2020-03-18 ENCOUNTER — TELEPHONE (OUTPATIENT)
Dept: FAMILY MEDICINE CLINIC | Age: 65
End: 2020-03-18

## 2020-03-18 RX ORDER — AMLODIPINE BESYLATE 10 MG/1
TABLET ORAL
Qty: 90 TABLET | Refills: 1 | Status: SHIPPED
Start: 2020-03-18 | End: 2020-06-02 | Stop reason: SDUPTHER

## 2020-03-18 RX ORDER — LISINOPRIL 20 MG/1
TABLET ORAL
Qty: 90 TABLET | Refills: 1 | Status: SHIPPED
Start: 2020-03-18 | End: 2020-06-02 | Stop reason: SDUPTHER

## 2020-03-18 RX ORDER — TIOTROPIUM BROMIDE 18 UG/1
CAPSULE ORAL; RESPIRATORY (INHALATION)
Qty: 30 CAPSULE | Refills: 5 | Status: SHIPPED
Start: 2020-03-18 | End: 2020-06-02 | Stop reason: SDUPTHER

## 2020-03-18 RX ORDER — HYDROCHLOROTHIAZIDE 12.5 MG/1
TABLET ORAL
Qty: 90 TABLET | Refills: 1 | Status: SHIPPED
Start: 2020-03-18 | End: 2020-06-02 | Stop reason: SDUPTHER

## 2020-03-18 RX ORDER — SIMVASTATIN 40 MG
TABLET ORAL
Qty: 90 TABLET | Refills: 1 | Status: SHIPPED
Start: 2020-03-18 | End: 2020-06-02 | Stop reason: SDUPTHER

## 2020-06-02 RX ORDER — TIOTROPIUM BROMIDE 18 UG/1
CAPSULE ORAL; RESPIRATORY (INHALATION)
Qty: 30 CAPSULE | Refills: 0 | Status: SHIPPED | OUTPATIENT
Start: 2020-06-02

## 2020-06-02 RX ORDER — AMLODIPINE BESYLATE 10 MG/1
TABLET ORAL
Qty: 30 TABLET | Refills: 0 | Status: SHIPPED | OUTPATIENT
Start: 2020-06-02

## 2020-06-02 RX ORDER — SIMVASTATIN 40 MG
TABLET ORAL
Qty: 30 TABLET | Refills: 0 | Status: SHIPPED | OUTPATIENT
Start: 2020-06-02

## 2020-06-02 RX ORDER — HYDROCHLOROTHIAZIDE 12.5 MG/1
TABLET ORAL
Qty: 30 TABLET | Refills: 0 | Status: ON HOLD | OUTPATIENT
Start: 2020-06-02 | End: 2021-10-28 | Stop reason: HOSPADM

## 2020-06-02 RX ORDER — LISINOPRIL 20 MG/1
TABLET ORAL
Qty: 30 TABLET | Refills: 0 | Status: SHIPPED | OUTPATIENT
Start: 2020-06-02

## 2021-10-23 ENCOUNTER — APPOINTMENT (OUTPATIENT)
Dept: GENERAL RADIOLOGY | Age: 66
DRG: 189 | End: 2021-10-23
Payer: MEDICARE

## 2021-10-23 ENCOUNTER — APPOINTMENT (OUTPATIENT)
Dept: CT IMAGING | Age: 66
DRG: 189 | End: 2021-10-23
Payer: MEDICARE

## 2021-10-23 ENCOUNTER — HOSPITAL ENCOUNTER (INPATIENT)
Age: 66
LOS: 4 days | Discharge: HOME | DRG: 189 | End: 2021-10-28
Attending: EMERGENCY MEDICINE | Admitting: INTERNAL MEDICINE
Payer: MEDICARE

## 2021-10-23 DIAGNOSIS — E87.29 RESPIRATORY ACIDOSIS: ICD-10-CM

## 2021-10-23 DIAGNOSIS — J44.1 COPD EXACERBATION (HCC): ICD-10-CM

## 2021-10-23 DIAGNOSIS — R41.0 ACUTE DELIRIUM: Primary | ICD-10-CM

## 2021-10-23 LAB
ALBUMIN SERPL-MCNC: 3.4 G/DL (ref 3.5–5.2)
ALP BLD-CCNC: 81 U/L (ref 35–104)
ALT SERPL-CCNC: 26 U/L (ref 0–32)
ANION GAP SERPL CALCULATED.3IONS-SCNC: 2 MMOL/L (ref 7–16)
AST SERPL-CCNC: 35 U/L (ref 0–31)
BASOPHILS ABSOLUTE: 0.01 E9/L (ref 0–0.2)
BASOPHILS RELATIVE PERCENT: 0.1 % (ref 0–2)
BILIRUB SERPL-MCNC: 0.4 MG/DL (ref 0–1.2)
BUN BLDV-MCNC: 16 MG/DL (ref 6–23)
CALCIUM SERPL-MCNC: 9 MG/DL (ref 8.6–10.2)
CHLORIDE BLD-SCNC: 83 MMOL/L (ref 98–107)
CO2: 45 MMOL/L (ref 22–29)
CREAT SERPL-MCNC: 0.6 MG/DL (ref 0.5–1)
EOSINOPHILS ABSOLUTE: 0.06 E9/L (ref 0.05–0.5)
EOSINOPHILS RELATIVE PERCENT: 0.6 % (ref 0–6)
GFR AFRICAN AMERICAN: >60
GFR NON-AFRICAN AMERICAN: >60 ML/MIN/1.73
GLUCOSE BLD-MCNC: 145 MG/DL (ref 74–99)
HCT VFR BLD CALC: 38.9 % (ref 34–48)
HEMOGLOBIN: 11.3 G/DL (ref 11.5–15.5)
IMMATURE GRANULOCYTES #: 0.05 E9/L
IMMATURE GRANULOCYTES %: 0.5 % (ref 0–5)
LACTIC ACID: 0.8 MMOL/L (ref 0.5–2.2)
LYMPHOCYTES ABSOLUTE: 0.92 E9/L (ref 1.5–4)
LYMPHOCYTES RELATIVE PERCENT: 9.6 % (ref 20–42)
MCH RBC QN AUTO: 30.1 PG (ref 26–35)
MCHC RBC AUTO-ENTMCNC: 29 % (ref 32–34.5)
MCV RBC AUTO: 103.7 FL (ref 80–99.9)
MONOCYTES ABSOLUTE: 0.52 E9/L (ref 0.1–0.95)
MONOCYTES RELATIVE PERCENT: 5.5 % (ref 2–12)
NEUTROPHILS ABSOLUTE: 7.98 E9/L (ref 1.8–7.3)
NEUTROPHILS RELATIVE PERCENT: 83.7 % (ref 43–80)
PDW BLD-RTO: 13.2 FL (ref 11.5–15)
PLATELET # BLD: 134 E9/L (ref 130–450)
PMV BLD AUTO: 10.5 FL (ref 7–12)
POTASSIUM SERPL-SCNC: 4.6 MMOL/L (ref 3.5–5)
PRO-BNP: 377 PG/ML (ref 0–125)
RBC # BLD: 3.75 E12/L (ref 3.5–5.5)
REASON FOR REJECTION: NORMAL
REJECTED TEST: NORMAL
SODIUM BLD-SCNC: 130 MMOL/L (ref 132–146)
TOTAL PROTEIN: 7.6 G/DL (ref 6.4–8.3)
WBC # BLD: 9.5 E9/L (ref 4.5–11.5)

## 2021-10-23 PROCEDURE — 94640 AIRWAY INHALATION TREATMENT: CPT

## 2021-10-23 PROCEDURE — 83880 ASSAY OF NATRIURETIC PEPTIDE: CPT

## 2021-10-23 PROCEDURE — 80307 DRUG TEST PRSMV CHEM ANLYZR: CPT

## 2021-10-23 PROCEDURE — 36415 COLL VENOUS BLD VENIPUNCTURE: CPT

## 2021-10-23 PROCEDURE — 85025 COMPLETE CBC W/AUTO DIFF WBC: CPT

## 2021-10-23 PROCEDURE — 6370000000 HC RX 637 (ALT 250 FOR IP)

## 2021-10-23 PROCEDURE — 82077 ASSAY SPEC XCP UR&BREATH IA: CPT

## 2021-10-23 PROCEDURE — 99284 EMERGENCY DEPT VISIT MOD MDM: CPT

## 2021-10-23 PROCEDURE — 71046 X-RAY EXAM CHEST 2 VIEWS: CPT

## 2021-10-23 PROCEDURE — 70450 CT HEAD/BRAIN W/O DYE: CPT

## 2021-10-23 PROCEDURE — 80143 DRUG ASSAY ACETAMINOPHEN: CPT

## 2021-10-23 PROCEDURE — 80053 COMPREHEN METABOLIC PANEL: CPT

## 2021-10-23 PROCEDURE — 83605 ASSAY OF LACTIC ACID: CPT

## 2021-10-23 PROCEDURE — 80179 DRUG ASSAY SALICYLATE: CPT

## 2021-10-23 RX ORDER — IPRATROPIUM BROMIDE AND ALBUTEROL SULFATE 2.5; .5 MG/3ML; MG/3ML
1 SOLUTION RESPIRATORY (INHALATION) ONCE
Status: COMPLETED | OUTPATIENT
Start: 2021-10-23 | End: 2021-10-23

## 2021-10-23 RX ADMIN — IPRATROPIUM BROMIDE AND ALBUTEROL SULFATE 1 AMPULE: .5; 3 SOLUTION RESPIRATORY (INHALATION) at 23:38

## 2021-10-23 ASSESSMENT — ENCOUNTER SYMPTOMS
COUGH: 1
VOMITING: 0
BLOOD IN STOOL: 0
BACK PAIN: 0
ABDOMINAL PAIN: 0
SHORTNESS OF BREATH: 0
WHEEZING: 1
CONSTIPATION: 0
DIARRHEA: 0
NAUSEA: 0
CHEST TIGHTNESS: 0

## 2021-10-24 PROBLEM — J44.1 COPD EXACERBATION (HCC): Status: ACTIVE | Noted: 2021-10-24

## 2021-10-24 LAB
ACETAMINOPHEN LEVEL: <5 MCG/ML (ref 10–30)
ADENOVIRUS BY PCR: NOT DETECTED
AMPHETAMINE SCREEN, URINE: NOT DETECTED
ANION GAP SERPL CALCULATED.3IONS-SCNC: 2 MMOL/L (ref 7–16)
B.E.: 20 MMOL/L (ref -3–3)
B.E.: 20.9 MMOL/L (ref -3–3)
BACTERIA: ABNORMAL /HPF
BARBITURATE SCREEN URINE: NOT DETECTED
BENZODIAZEPINE SCREEN, URINE: NOT DETECTED
BILIRUBIN URINE: NEGATIVE
BLOOD, URINE: ABNORMAL
BORDETELLA PARAPERTUSSIS BY PCR: NOT DETECTED
BORDETELLA PERTUSSIS BY PCR: NOT DETECTED
BUN BLDV-MCNC: 18 MG/DL (ref 6–23)
CALCIUM SERPL-MCNC: 9.1 MG/DL (ref 8.6–10.2)
CANNABINOID SCREEN URINE: NOT DETECTED
CHLAMYDOPHILIA PNEUMONIAE BY PCR: NOT DETECTED
CHLORIDE BLD-SCNC: 83 MMOL/L (ref 98–107)
CHP ED QC CHECK: NORMAL
CLARITY: ABNORMAL
CO2: 46 MMOL/L (ref 22–29)
COCAINE METABOLITE SCREEN URINE: NOT DETECTED
COHB: 1.3 % (ref 0–1.5)
COHB: 1.3 % (ref 0–1.5)
COLOR: YELLOW
CORONAVIRUS 229E BY PCR: NOT DETECTED
CORONAVIRUS HKU1 BY PCR: NOT DETECTED
CORONAVIRUS NL63 BY PCR: NOT DETECTED
CORONAVIRUS OC43 BY PCR: NOT DETECTED
CREAT SERPL-MCNC: 0.7 MG/DL (ref 0.5–1)
CRITICAL: ABNORMAL
CRITICAL: ABNORMAL
DATE ANALYZED: ABNORMAL
DATE ANALYZED: ABNORMAL
DATE OF COLLECTION: ABNORMAL
DATE OF COLLECTION: ABNORMAL
EKG ATRIAL RATE: 89 BPM
EKG P AXIS: 73 DEGREES
EKG P-R INTERVAL: 132 MS
EKG Q-T INTERVAL: 378 MS
EKG QRS DURATION: 90 MS
EKG QTC CALCULATION (BAZETT): 459 MS
EKG R AXIS: 59 DEGREES
EKG T AXIS: 53 DEGREES
EKG VENTRICULAR RATE: 89 BPM
EPITHELIAL CELLS, UA: ABNORMAL /HPF
ETHANOL: <10 MG/DL (ref 0–0.08)
FENTANYL SCREEN, URINE: NOT DETECTED
GFR AFRICAN AMERICAN: >60
GFR NON-AFRICAN AMERICAN: >60 ML/MIN/1.73
GLUCOSE BLD-MCNC: 136 MG/DL
GLUCOSE BLD-MCNC: 145 MG/DL
GLUCOSE BLD-MCNC: 152 MG/DL (ref 74–99)
GLUCOSE BLD-MCNC: 154 MG/DL
GLUCOSE URINE: NEGATIVE MG/DL
HCO3: 50 MMOL/L (ref 22–26)
HCO3: 52.2 MMOL/L (ref 22–26)
HHB: 2.2 % (ref 0–5)
HHB: 2.9 % (ref 0–5)
HUMAN METAPNEUMOVIRUS BY PCR: NOT DETECTED
HUMAN RHINOVIRUS/ENTEROVIRUS BY PCR: NOT DETECTED
INFLUENZA A BY PCR: NOT DETECTED
INFLUENZA B BY PCR: NOT DETECTED
KETONES, URINE: NEGATIVE MG/DL
LAB: ABNORMAL
LAB: ABNORMAL
LEUKOCYTE ESTERASE, URINE: ABNORMAL
Lab: ABNORMAL
Lab: ABNORMAL
Lab: NORMAL
METER GLUCOSE: 136 MG/DL (ref 74–99)
METER GLUCOSE: 145 MG/DL (ref 74–99)
METER GLUCOSE: 152 MG/DL (ref 74–99)
METER GLUCOSE: 154 MG/DL (ref 74–99)
METHADONE SCREEN, URINE: NOT DETECTED
METHB: 0.1 % (ref 0–1.5)
METHB: 0.2 % (ref 0–1.5)
MODE: ABNORMAL
MODE: ABNORMAL
MYCOPLASMA PNEUMONIAE BY PCR: NOT DETECTED
NITRITE, URINE: NEGATIVE
O2 CONTENT: 16.5 ML/DL
O2 CONTENT: 16.7 ML/DL
O2 SATURATION: 97.1 % (ref 92–98.5)
O2 SATURATION: 97.8 % (ref 92–98.5)
O2HB: 95.7 % (ref 94–97)
O2HB: 96.3 % (ref 94–97)
OPERATOR ID: 166
OPERATOR ID: ABNORMAL
OPIATE SCREEN URINE: NOT DETECTED
OSMOLALITY URINE: 369 MOSM/KG (ref 300–900)
OSMOLALITY: 289 MOSM/KG (ref 285–310)
OXYCODONE URINE: NOT DETECTED
PARAINFLUENZA VIRUS 1 BY PCR: NOT DETECTED
PARAINFLUENZA VIRUS 2 BY PCR: NOT DETECTED
PARAINFLUENZA VIRUS 3 BY PCR: NOT DETECTED
PARAINFLUENZA VIRUS 4 BY PCR: NOT DETECTED
PATIENT TEMP: 37 C
PATIENT TEMP: 37 C
PCO2: 105.6 MMHG (ref 35–45)
PCO2: 90.8 MMHG (ref 35–45)
PH BLOOD GAS: 7.31 (ref 7.35–7.45)
PH BLOOD GAS: 7.36 (ref 7.35–7.45)
PH UA: 7.5 (ref 5–9)
PHENCYCLIDINE SCREEN URINE: NOT DETECTED
PO2: 107.1 MMHG (ref 75–100)
PO2: 92.7 MMHG (ref 75–100)
POTASSIUM SERPL-SCNC: 4.5 MMOL/L (ref 3.5–5)
PROTEIN UA: NEGATIVE MG/DL
RBC UA: ABNORMAL /HPF (ref 0–2)
RESPIRATORY SYNCYTIAL VIRUS BY PCR: NOT DETECTED
SALICYLATE, SERUM: <0.3 MG/DL (ref 0–30)
SARS-COV-2, NAAT: NOT DETECTED
SARS-COV-2, PCR: NOT DETECTED
SODIUM BLD-SCNC: 131 MMOL/L (ref 132–146)
SODIUM URINE: 74 MMOL/L
SOURCE, BLOOD GAS: ABNORMAL
SOURCE, BLOOD GAS: ABNORMAL
SPECIFIC GRAVITY UA: 1.01 (ref 1–1.03)
THB: 12.1 G/DL (ref 11.5–16.5)
THB: 12.3 G/DL (ref 11.5–16.5)
TIME ANALYZED: 36
TIME ANALYZED: 803
TRICYCLIC ANTIDEPRESSANTS SCREEN SERUM: NEGATIVE NG/ML
TROPONIN, HIGH SENSITIVITY: 15 NG/L (ref 0–9)
UROBILINOGEN, URINE: 2 E.U./DL
WBC UA: ABNORMAL /HPF (ref 0–5)

## 2021-10-24 PROCEDURE — 6370000000 HC RX 637 (ALT 250 FOR IP): Performed by: INTERNAL MEDICINE

## 2021-10-24 PROCEDURE — 2700000000 HC OXYGEN THERAPY PER DAY

## 2021-10-24 PROCEDURE — 2580000003 HC RX 258: Performed by: INTERNAL MEDICINE

## 2021-10-24 PROCEDURE — 84300 ASSAY OF URINE SODIUM: CPT

## 2021-10-24 PROCEDURE — 0202U NFCT DS 22 TRGT SARS-COV-2: CPT

## 2021-10-24 PROCEDURE — 84484 ASSAY OF TROPONIN QUANT: CPT

## 2021-10-24 PROCEDURE — 87635 SARS-COV-2 COVID-19 AMP PRB: CPT

## 2021-10-24 PROCEDURE — 81001 URINALYSIS AUTO W/SCOPE: CPT

## 2021-10-24 PROCEDURE — 93005 ELECTROCARDIOGRAM TRACING: CPT | Performed by: PHYSICIAN ASSISTANT

## 2021-10-24 PROCEDURE — 82962 GLUCOSE BLOOD TEST: CPT

## 2021-10-24 PROCEDURE — 6360000002 HC RX W HCPCS: Performed by: INTERNAL MEDICINE

## 2021-10-24 PROCEDURE — 82805 BLOOD GASES W/O2 SATURATION: CPT

## 2021-10-24 PROCEDURE — 83930 ASSAY OF BLOOD OSMOLALITY: CPT

## 2021-10-24 PROCEDURE — 36415 COLL VENOUS BLD VENIPUNCTURE: CPT

## 2021-10-24 PROCEDURE — 94640 AIRWAY INHALATION TREATMENT: CPT

## 2021-10-24 PROCEDURE — 83935 ASSAY OF URINE OSMOLALITY: CPT

## 2021-10-24 PROCEDURE — 80048 BASIC METABOLIC PNL TOTAL CA: CPT

## 2021-10-24 PROCEDURE — 6370000000 HC RX 637 (ALT 250 FOR IP): Performed by: NURSE PRACTITIONER

## 2021-10-24 PROCEDURE — 6360000002 HC RX W HCPCS

## 2021-10-24 PROCEDURE — 80307 DRUG TEST PRSMV CHEM ANLYZR: CPT

## 2021-10-24 PROCEDURE — 94660 CPAP INITIATION&MGMT: CPT

## 2021-10-24 PROCEDURE — 2060000000 HC ICU INTERMEDIATE R&B

## 2021-10-24 RX ORDER — METHYLPREDNISOLONE SODIUM SUCCINATE 40 MG/ML
40 INJECTION, POWDER, LYOPHILIZED, FOR SOLUTION INTRAMUSCULAR; INTRAVENOUS EVERY 12 HOURS
Status: DISCONTINUED | OUTPATIENT
Start: 2021-10-24 | End: 2021-10-25

## 2021-10-24 RX ORDER — HYDROCHLOROTHIAZIDE 12.5 MG/1
1 TABLET ORAL DAILY
Status: DISCONTINUED | OUTPATIENT
Start: 2021-10-24 | End: 2021-10-24

## 2021-10-24 RX ORDER — SODIUM CHLORIDE 0.9 % (FLUSH) 0.9 %
10 SYRINGE (ML) INJECTION EVERY 12 HOURS SCHEDULED
Status: DISCONTINUED | OUTPATIENT
Start: 2021-10-24 | End: 2021-10-28 | Stop reason: HOSPADM

## 2021-10-24 RX ORDER — ACETAMINOPHEN 650 MG/1
650 SUPPOSITORY RECTAL EVERY 6 HOURS PRN
Status: DISCONTINUED | OUTPATIENT
Start: 2021-10-24 | End: 2021-10-28 | Stop reason: HOSPADM

## 2021-10-24 RX ORDER — DEXTROSE MONOHYDRATE 25 G/50ML
12.5 INJECTION, SOLUTION INTRAVENOUS PRN
Status: DISCONTINUED | OUTPATIENT
Start: 2021-10-24 | End: 2021-10-28 | Stop reason: HOSPADM

## 2021-10-24 RX ORDER — SENNA PLUS 8.6 MG/1
1 TABLET ORAL DAILY PRN
Status: DISCONTINUED | OUTPATIENT
Start: 2021-10-24 | End: 2021-10-28 | Stop reason: HOSPADM

## 2021-10-24 RX ORDER — ATORVASTATIN CALCIUM 40 MG/1
20 TABLET, FILM COATED ORAL DAILY
Refills: 0 | Status: DISCONTINUED | OUTPATIENT
Start: 2021-10-24 | End: 2021-10-28 | Stop reason: HOSPADM

## 2021-10-24 RX ORDER — SODIUM CHLORIDE 0.9 % (FLUSH) 0.9 %
10 SYRINGE (ML) INJECTION PRN
Status: DISCONTINUED | OUTPATIENT
Start: 2021-10-24 | End: 2021-10-28 | Stop reason: HOSPADM

## 2021-10-24 RX ORDER — ALBUTEROL SULFATE 2.5 MG/3ML
2.5 SOLUTION RESPIRATORY (INHALATION) EVERY 4 HOURS PRN
Status: DISCONTINUED | OUTPATIENT
Start: 2021-10-24 | End: 2021-10-28

## 2021-10-24 RX ORDER — AMLODIPINE BESYLATE 10 MG/1
10 TABLET ORAL DAILY
Status: DISCONTINUED | OUTPATIENT
Start: 2021-10-24 | End: 2021-10-28 | Stop reason: HOSPADM

## 2021-10-24 RX ORDER — DEXTROSE MONOHYDRATE 50 MG/ML
100 INJECTION, SOLUTION INTRAVENOUS PRN
Status: DISCONTINUED | OUTPATIENT
Start: 2021-10-24 | End: 2021-10-28 | Stop reason: HOSPADM

## 2021-10-24 RX ORDER — NICOTINE POLACRILEX 4 MG
15 LOZENGE BUCCAL PRN
Status: DISCONTINUED | OUTPATIENT
Start: 2021-10-24 | End: 2021-10-28 | Stop reason: HOSPADM

## 2021-10-24 RX ORDER — ACETAZOLAMIDE 500 MG/5ML
500 INJECTION, POWDER, LYOPHILIZED, FOR SOLUTION INTRAVENOUS EVERY 12 HOURS
Status: DISCONTINUED | OUTPATIENT
Start: 2021-10-24 | End: 2021-10-24 | Stop reason: SDUPTHER

## 2021-10-24 RX ORDER — POTASSIUM CHLORIDE 7.45 MG/ML
10 INJECTION INTRAVENOUS PRN
Status: DISCONTINUED | OUTPATIENT
Start: 2021-10-24 | End: 2021-10-28 | Stop reason: HOSPADM

## 2021-10-24 RX ORDER — POTASSIUM CHLORIDE 20 MEQ/1
40 TABLET, EXTENDED RELEASE ORAL PRN
Status: DISCONTINUED | OUTPATIENT
Start: 2021-10-24 | End: 2021-10-28 | Stop reason: HOSPADM

## 2021-10-24 RX ORDER — SODIUM CHLORIDE 9 MG/ML
25 INJECTION, SOLUTION INTRAVENOUS PRN
Status: DISCONTINUED | OUTPATIENT
Start: 2021-10-24 | End: 2021-10-28 | Stop reason: HOSPADM

## 2021-10-24 RX ORDER — LISINOPRIL 20 MG/1
20 TABLET ORAL DAILY
Status: DISCONTINUED | OUTPATIENT
Start: 2021-10-24 | End: 2021-10-28 | Stop reason: HOSPADM

## 2021-10-24 RX ORDER — PANTOPRAZOLE SODIUM 40 MG/1
40 TABLET, DELAYED RELEASE ORAL
Status: DISCONTINUED | OUTPATIENT
Start: 2021-10-24 | End: 2021-10-28 | Stop reason: HOSPADM

## 2021-10-24 RX ORDER — ACETAMINOPHEN 325 MG/1
650 TABLET ORAL EVERY 6 HOURS PRN
Status: DISCONTINUED | OUTPATIENT
Start: 2021-10-24 | End: 2021-10-28 | Stop reason: HOSPADM

## 2021-10-24 RX ORDER — METHYLPREDNISOLONE SODIUM SUCCINATE 125 MG/2ML
125 INJECTION, POWDER, LYOPHILIZED, FOR SOLUTION INTRAMUSCULAR; INTRAVENOUS ONCE
Status: COMPLETED | OUTPATIENT
Start: 2021-10-24 | End: 2021-10-24

## 2021-10-24 RX ORDER — IPRATROPIUM BROMIDE AND ALBUTEROL SULFATE 2.5; .5 MG/3ML; MG/3ML
1 SOLUTION RESPIRATORY (INHALATION)
Status: DISCONTINUED | OUTPATIENT
Start: 2021-10-24 | End: 2021-10-28 | Stop reason: HOSPADM

## 2021-10-24 RX ORDER — SODIUM CHLORIDE 9 MG/ML
INJECTION, SOLUTION INTRAVENOUS CONTINUOUS
Status: DISCONTINUED | OUTPATIENT
Start: 2021-10-24 | End: 2021-10-25

## 2021-10-24 RX ORDER — ALBUTEROL SULFATE 90 UG/1
2 AEROSOL, METERED RESPIRATORY (INHALATION) EVERY 4 HOURS PRN
Status: DISCONTINUED | OUTPATIENT
Start: 2021-10-24 | End: 2021-10-24 | Stop reason: CLARIF

## 2021-10-24 RX ORDER — OMEPRAZOLE 20 MG/1
20 CAPSULE, DELAYED RELEASE ORAL
COMMUNITY

## 2021-10-24 RX ADMIN — SERTRALINE HYDROCHLORIDE 50 MG: 50 TABLET ORAL at 20:51

## 2021-10-24 RX ADMIN — INSULIN LISPRO 1 UNITS: 100 INJECTION, SOLUTION INTRAVENOUS; SUBCUTANEOUS at 20:51

## 2021-10-24 RX ADMIN — ACETAZOLAMIDE 500 MG: 500 INJECTION, POWDER, LYOPHILIZED, FOR SOLUTION INTRAVENOUS at 17:04

## 2021-10-24 RX ADMIN — Medication 10 ML: at 08:11

## 2021-10-24 RX ADMIN — PANTOPRAZOLE SODIUM 40 MG: 40 TABLET, DELAYED RELEASE ORAL at 07:41

## 2021-10-24 RX ADMIN — AMLODIPINE BESYLATE 10 MG: 10 TABLET ORAL at 08:06

## 2021-10-24 RX ADMIN — IPRATROPIUM BROMIDE AND ALBUTEROL SULFATE 1 AMPULE: .5; 2.5 SOLUTION RESPIRATORY (INHALATION) at 20:13

## 2021-10-24 RX ADMIN — INSULIN LISPRO 1 UNITS: 100 INJECTION, SOLUTION INTRAVENOUS; SUBCUTANEOUS at 08:07

## 2021-10-24 RX ADMIN — IPRATROPIUM BROMIDE 0.5 MG: 0.5 SOLUTION RESPIRATORY (INHALATION) at 08:14

## 2021-10-24 RX ADMIN — ENOXAPARIN SODIUM 40 MG: 40 INJECTION SUBCUTANEOUS at 08:06

## 2021-10-24 RX ADMIN — IPRATROPIUM BROMIDE AND ALBUTEROL SULFATE 1 AMPULE: .5; 2.5 SOLUTION RESPIRATORY (INHALATION) at 15:49

## 2021-10-24 RX ADMIN — METHYLPREDNISOLONE SODIUM SUCCINATE 40 MG: 40 INJECTION, POWDER, LYOPHILIZED, FOR SOLUTION INTRAMUSCULAR; INTRAVENOUS at 14:29

## 2021-10-24 RX ADMIN — IPRATROPIUM BROMIDE AND ALBUTEROL SULFATE 1 AMPULE: .5; 2.5 SOLUTION RESPIRATORY (INHALATION) at 13:16

## 2021-10-24 RX ADMIN — METHYLPREDNISOLONE SODIUM SUCCINATE 125 MG: 125 INJECTION, POWDER, FOR SOLUTION INTRAMUSCULAR; INTRAVENOUS at 02:40

## 2021-10-24 RX ADMIN — ALBUTEROL SULFATE 2.5 MG: 2.5 SOLUTION RESPIRATORY (INHALATION) at 08:15

## 2021-10-24 RX ADMIN — SODIUM CHLORIDE: 9 INJECTION, SOLUTION INTRAVENOUS at 17:03

## 2021-10-24 RX ADMIN — ATORVASTATIN CALCIUM 20 MG: 40 TABLET, FILM COATED ORAL at 08:06

## 2021-10-24 RX ADMIN — LISINOPRIL 20 MG: 20 TABLET ORAL at 08:06

## 2021-10-24 ASSESSMENT — PAIN SCALES - GENERAL: PAINLEVEL_OUTOF10: 0

## 2021-10-24 NOTE — ED NOTES
Patient repositioned with previous nurse Wanda Laughlin RN and pure wick in place at this time for urine specimen.      Kiara Saenz RN  10/24/21 0990

## 2021-10-24 NOTE — ED NOTES
FIRST PROVIDER CONTACT ASSESSMENT NOTE                                                                                                Department of Emergency Medicine                                                      First Provider Note  10/23/21  10:02 PM EDT  NAME: Shon Guerrero  : 1955  MRN: [id number]    Chief Complaint: Shortness of Breath (Pt stated a month and is getting worse), Fatigue, and Hallucinations      History of Present Illness:   Shon Guerrero is a 74 y.o. female who presents to the ED for shortness of breath. Patient states for the last month she has been worsening shortness of breath and is getting worse. Patient states she is extremely fatigued and weak. Patient states she is also hallucinating. Patient states she did start new medications for anxiety. Patient has no thoughts of hurting herself or others. Patient states she is having no chest pain. Patient states she is normally on 2 L of oxygen but has been requiring 3 L recently. Patient has not been Covid vaccination    Focused Physical Exam:  VS:    ED Triage Vitals   BP Temp Temp src Pulse Resp SpO2 Height Weight   -- -- -- -- -- -- -- --        General: Alert and in no apparent distress. Medical History:  has a past medical history of COPD (chronic obstructive pulmonary disease) ([facility] [address] Hyperlipidemia, and Hypertension. Surgical History:  has a past surgical history that includes  section. Social History:  reports that she quit smoking about 9 years ago. Her smoking use included cigarettes. She started smoking about 50 years ago. She has a 31.00 pack-year smoking history. She has never used smokeless tobacco. She reports previous alcohol use. She reports that she does not use drugs. Family History: family history includes COPD in her father; Cancer in her mother. Allergies: Patient has no known allergies.      Initial Plan of Care:  Initiate Treatment-Testing, Proceed toTreatment Area When Bed Available for ED Attending/MLP to Continue Care    -------------------------------------------------END OF FIRST PROVIDER CONTACT ASSESSMENT NOTE--------------------------------------------------------  Electronically signed by Tye Sin PA-C   DD: 10/23/21       Tye Sin PA-C  10/23/21 [facility]  10/23/21 0881

## 2021-10-24 NOTE — ED NOTES
Pure wick not working so this nurse assisted patient onto bedpan at this time for urine specimen.      Julissa Padgett RN  10/24/21 9686

## 2021-10-24 NOTE — ED PROVIDER NOTES
[address]      Pt Name: Trinity Del Rio  MRN: [id number]  [place] 1955  Date of evaluation: 10/23/2021      CHIEF COMPLAINT       Chief Complaint   Patient presents with    Shortness of Breath     Pt stated a month and is getting worse    Fatigue    Hallucinations        HPI  Trinity Del Rio is a 74 y.o. female past medical history of emphysema (on 3L oxygen) presents with increasing cough, fatigue, hallucinations for the past week and a half. Patient states that she has been a lot more weak and fatigued than usual. Patient had new episode of falls today, she states her legs just gave out and she feel on her butt and back. Denies any head trauma, denies any pain. Patient states she has been having on and off hallucinating episodes where she sees dead family members or her grandchildren when they are not present. Patient states that she did start sertraline in August and has been taking half a pill and just recently started taking 1 full pill as prescribed, she is worried there might be some medication interaction. Patient also complaining of increase in cough that is productive with purulent sputum for the past 2 days, denies any worsening of her shortness of breath however states that she is wheezing more than usual.  Associated with decrease in urine frequency but denies any dysuria, hematuria, flank pain. Denies any fever, chills, n/v, headache, dizziness, weakness, cp, palpitations, abd pain, diarrhea, constipation. Denies any suicidal or homicidal ideations. Not COVID vaccinated, denies any sick contacts, no recent travels. Except as noted above the remainder of the review of systems was reviewed and negative. Review of Systems   Constitutional: Positive for fatigue. Negative for activity change, appetite change, chills and fever. HENT: Negative for congestion, nosebleeds and tinnitus.     Eyes: Negative for visual disturbance. Respiratory: Positive for cough and wheezing. Negative for chest tightness and shortness of breath. Cardiovascular: Negative for chest pain, palpitations and leg swelling. Gastrointestinal: Negative for abdominal pain, blood in stool, constipation, diarrhea, nausea and vomiting. Endocrine: Negative for polydipsia and polyphagia. Genitourinary: Positive for decreased urine volume. Negative for dysuria, flank pain, frequency, hematuria, vaginal bleeding, vaginal discharge and vaginal pain. Musculoskeletal: Negative for back pain, gait problem, joint swelling and myalgias. Skin: Negative for rash. Allergic/Immunologic: Negative for immunocompromised state. Neurological: Negative for dizziness, syncope, weakness, numbness and headaches. Hematological: Negative for adenopathy. Psychiatric/Behavioral: Negative for behavioral problems, confusion and hallucinations. Physical Exam  Constitutional:       General: She is not in acute distress. Appearance: Normal appearance. She is obese. She is not ill-appearing, toxic-appearing or diaphoretic. HENT:      Head: Normocephalic and atraumatic. Right Ear: External ear normal.      Left Ear: External ear normal.      Nose: Nose normal. No congestion or rhinorrhea. Mouth/Throat:      Mouth: Mucous membranes are moist.      Pharynx: Oropharynx is clear. No oropharyngeal exudate or posterior oropharyngeal erythema. Eyes:      Conjunctiva/sclera: Conjunctivae normal.      Pupils: Pupils are equal, round, and reactive to light. Cardiovascular:      Rate and Rhythm: Normal rate and regular rhythm. Pulses: Normal pulses. Heart sounds: Normal heart sounds. Pulmonary:      Effort: Pulmonary effort is normal.      Breath sounds: Examination of the right-upper field reveals wheezing. Examination of the left-upper field reveals wheezing. Examination of the right-middle field reveals wheezing.  Examination of the left-middle field reveals wheezing. Examination of the right-lower field reveals wheezing. Examination of the left-lower field reveals wheezing. Wheezing present. Comments: On 3L oxygen NC    Chest:      Chest wall: No deformity or tenderness. Abdominal:      General: Abdomen is flat. Bowel sounds are normal. There is no distension. Palpations: Abdomen is soft. There is no mass. Tenderness: There is no abdominal tenderness. There is no right CVA tenderness, left CVA tenderness or guarding. Hernia: No hernia is present. Musculoskeletal:      Cervical back: Normal range of motion. Right lower leg: No tenderness. Left lower leg: No tenderness. Skin:     General: Skin is warm and dry. Capillary Refill: Capillary refill takes less than 2 seconds. Neurological:      General: No focal deficit present. Mental Status: She is alert and oriented to person, place, and time. Mental status is at baseline. Motor: No weakness. Psychiatric:         Mood and Affect: Mood normal. Mood is not anxious. Behavior: Behavior normal. Behavior is not agitated. Thought Content: Thought content normal.          Procedures     EKG: This EKG is signed by emergency department physician. Rate: 89  Rhythm: Sinus  Interpretation: no acute changes  Comparison: stable as compared to patient's most recent EKG     MDM    74 y.o. female past medical history of emphysema (on 3L oxygen) presents with increasing cough, fatigue, hallucinations for the past week and a half. Patient states that she has been a lot more weak and fatigued than usual. While in the ED patient was initially hypoxic on RA but immediately improved after put on 3L oxygen hemodynamically stable, nontoxic-appearing, in no respiratory distress. Physical exam remarkable for wheezing which was significantly improved after Duoneb treatment, patient also had a treatment at home.  CXR and CT head unremarkale for any acute pathologies. ABG remarkable for respiratory acidosis pH: 7.312 PCO2: 105.6  HCO3:52.2 patient placed on Bipap and tolerating well. Spoke to patient about likelyhood her confusion and hallucination episodes may be due to her hypercapnia, patient agrees with plan for admission for further management. She was given steroids, and pending COVID test. Spoke to Dr. Benjamin Robledo who will admit patient for COPD exacerbation.            --------------------------------------------- PAST HISTORY ---------------------------------------------  Past Medical History:  has a past medical history of COPD (chronic obstructive pulmonary disease) ([address] Hyperlipidemia, and Hypertension. Past Surgical History:  has a past surgical history that includes  section. Social History:  reports that she quit smoking about 9 years ago. Her smoking use included cigarettes. She started smoking about 50 years ago. She has a 31.00 pack-year smoking history. She has never used smokeless tobacco. She reports previous alcohol use. She reports that she does not use drugs. Family History: family history includes COPD in her father; Cancer in her mother. The patients home medications have been reviewed. Allergies: Patient has no known allergies.     -------------------------------------------------- RESULTS -------------------------------------------------    LABS:  Results for orders placed or performed during the hospital encounter of 10/23/21   CBC auto differential   Result Value Ref Range    WBC 9.5 4.5 - 11.5 E9/L    RBC 3.75 3.50 - 5.50 E12/L    Hemoglobin 11.3 (L) 11.5 - 15.5 g/dL    Hematocrit 38.9 34.0 - 48.0 %    .7 (H) 80.0 - 99.9 fL    MCH 30.1 26.0 - 35.0 pg    MCHC 29.0 (L) 32.0 - 34.5 %    RDW 13.2 11.5 - 15.0 fL    Platelets 598 752 - 947 E9/L    MPV 10.5 7.0 - 12.0 fL    Neutrophils % 83.7 (H) 43.0 - 80.0 %    Immature Granulocytes % 0.5 0.0 - 5.0 %    Lymphocytes % 9.6 (L) 20.0 - 42.0 %    Monocytes % 5.5 2.0 - 12.0 %    Eosinophils % 0.6 0.0 - 6.0 %    Basophils % 0.1 0.0 - 2.0 %    Neutrophils Absolute 7.98 (H) 1.80 - 7.30 E9/L    Immature Granulocytes # 0.05 E9/L    Lymphocytes Absolute 0.92 (L) 1.50 - 4.00 E9/L    Monocytes Absolute 0.52 0.10 - 0.95 E9/L    Eosinophils Absolute 0.06 0.05 - 0.50 E9/L    Basophils Absolute 0.01 0.00 - 0.20 E9/L   Comprehensive Metabolic Panel   Result Value Ref Range    Sodium 130 (L) 132 - 146 mmol/L    Potassium 4.6 3.5 - 5.0 mmol/L    Chloride 83 (L) 98 - 107 mmol/L    CO2 45 (HH) 22 - 29 mmol/L    Anion Gap 2 (L) 7 - 16 mmol/L    Glucose 145 (H) 74 - 99 mg/dL    BUN 16 6 - 23 mg/dL    CREATININE 0.6 0.5 - 1.0 mg/dL    GFR Non-African American >60 >=60 mL/min/1.73    GFR African American >60     Calcium 9.0 8.6 - 10.2 mg/dL    Total Protein 7.6 6.4 - 8.3 g/dL    Albumin 3.4 (L) 3.5 - 5.2 g/dL    Total Bilirubin 0.4 0.0 - 1.2 mg/dL    Alkaline Phosphatase 81 35 - 104 U/L    ALT 26 0 - 32 U/L    AST 35 (H) 0 - 31 U/L   Lactic Acid, Plasma   Result Value Ref Range    Lactic Acid 0.8 0.5 - 2.2 mmol/L   Brain Natriuretic Peptide   Result Value Ref Range    Pro- (H) 0 - 125 pg/mL   Serum Drug Screen   Result Value Ref Range    Ethanol Lvl <10 mg/dL    Acetaminophen Level <5.0 (L) 10.0 - 35.1 mcg/mL    Salicylate, Serum <1.6 0.0 - 30.0 mg/dL   SPECIMEN REJECTION   Result Value Ref Range    Rejected Test trp5     Reason for Rejection see below    Blood Gas, Arterial   Result Value Ref Range    Date Analyzed [id number]     Time Analyzed 0036     Source: Blood Arterial     pH, Blood Gas 7.312 (L) 7.350 - 7.450    PCO2 105.6 (HH) 35.0 - 45.0 mmHg    PO2 107.1 (H) 75.0 - 100.0 mmHg    HCO3 52.2 (H) 22.0 - 26.0 mmol/L    B.E. 20.9 (H) -3.0 - 3.0 mmol/L    O2 Sat 97.8 92.0 - 98.5 %    O2Hb 96.3 94.0 - 97.0 %    COHb 1.3 0.0 - 1.5 %    MetHb 0.2 0.0 - 1.5 %    O2 Content 16.5 mL/dL    HHb 2.2 0.0 - 5.0 %    tHb (est) 12.1 11.5 - 16.5 g/dL    Mode NC- 3.5 L     Date Of Collection      Time Collected      Pt Temp 37.0 C     ID [id number]     Lab 20292     Critical(s) Notified Handed report to Dr/RN    EKG 12 Lead   Result Value Ref Range    Ventricular Rate 89 BPM    Atrial Rate 89 BPM    P-R Interval 132 ms    QRS Duration 90 ms    Q-T Interval 378 ms    QTc Calculation (Bazett) 459 ms    P Axis 73 degrees    R Axis 59 degrees    T Axis 53 degrees       RADIOLOGY:  CT HEAD WO CONTRAST   Final Result   No acute intracranial abnormality. XR CHEST (2 VW)   Final Result   No acute process. ------------------------- NURSING NOTES AND VITALS REVIEWED ---------------------------  Date / Time Roomed:  10/23/2021 10:11 PM  ED Bed Assignment:  16/16    The nursing notes within the ED encounter and vital signs as below have been reviewed. Patient Vitals for the past 24 hrs:   BP Temp Temp src Pulse Resp SpO2 Height Weight   10/24/21 0115 132/70 -- -- 83 14 92 % -- --   10/24/21 0056 -- -- -- -- 25 -- -- --   10/23/21 2347 -- -- -- -- -- 96 % -- --   10/23/21 2338 -- -- -- -- -- 98 % -- --   10/23/21 2309 (!) 151/64 -- -- 88 -- 99 % 5' 3\" (1.6 m) --   10/23/21 2208 -- -- -- -- -- 94 % -- --   10/23/21 2204 (!) 162/74 97.5 °F (36.4 °C) Oral 101 20 (!) 84 % -- 230 lb (104.3 kg)       Oxygen Saturation Interpretation: Improved after treatment    ------------------------------------------ PROGRESS NOTES ------------------------------------------  Re-evaluation(s):  Time: 1100  Patients symptoms show no change  Repeat physical examination is not changed    Counseling:  I have spoken with the patient and discussed todays results, in addition to providing specific details for the plan of care and counseling regarding the diagnosis and prognosis. Their questions are answered at this time and they are agreeable with the plan of admission.    --------------------------------- ADDITIONAL PROVIDER NOTES ---------------------------------  Consultations:  Time: 1255. Spoke with Dr. Olivia Morales. Discussed case. They will admit the patient. This patient's ED course included: a personal history and physicial examination, re-evaluation prior to disposition, multiple bedside re-evaluations, IV medications, cardiac monitoring and continuous pulse oximetry    This patient has remained hemodynamically stable during their ED course. Diagnosis:  1. Acute delirium    2. COPD exacerbation ([facility] [address]    3. Respiratory acidosis        Disposition:  Patient's disposition: Admit to telemetry  Patient's condition is stable.          Marjory Landau, MD  Resident  10/24/21 9333

## 2021-10-24 NOTE — ED NOTES
Hailey Shearing NP at bedside and advised nurse it is okay to place patient on nasal cannula at this time as long as POX permits. Patient placed on nasal cannula at this time at 3 liters as patient wears at home and POX 92-04% at this time, respiratory will be notified at this time.      Esther Erazo RN  10/24/21 7914

## 2021-10-24 NOTE — ED NOTES
Fermin Maciel in respiratory advised at this time of bipap being held and nasal cannula being applied.      Lilliana Orlando RN  10/24/21 6277

## 2021-10-24 NOTE — H&P
Internal Medicine History & Physical     Name: Chapin Haganelor  : 1955  Chief Complaint: Shortness of Breath (Pt stated a month and is getting worse), Fatigue, and Hallucinations  Primary Care Physician: Tobias Spencer DO  Admission date: 10/23/2021  Date of service: 10/24/2021     History of Present Illness    Christopher Hyman is a 74y.o. year old female who presents with shortness of breath and hallucinations. Her  is at bedside. These symptoms are moderate in severity. Symptoms are made better by increased oxygen. She reports that she has been hallucinating starting about 3 days ago. She stated she is not sure if she took her zoloft or if she took it but took too much of it. She reported increasing shortness of breath over last several weeks. She is normally on 2L O2 NC at home. She increased her O2 to 3L about 2 weeks ago due to shortness of breath. She denies any fever, hills, chest pain or palpitations. She denies productive cough. She also stated she has been having increasing weakness in her legs with a fall last evening. She stated her legs were so weak she just fell landing on her butt and back. She denies any pain at this time. Patient also reported decreased appetite. She denies any nausea,.vomiting, abdominal pain or diarrhea. She is currently on bipap will trial her off of it on nasal cannula and get repeat ABGs. CT head is unremarkable. No reported hallucinations at this time. Her  is at bedside. Medical history obtained from patient and . ED course:   Initial blood work and imaging studies performed. Admission recommended by ED physician. Case was  discussed with ED provider.  Meds in ED consisted of the following:  Medications   sodium chloride flush 0.9 % injection 10 mL (has no administration in time range)   sodium chloride flush 0.9 % injection 10 mL (has no administration in time range)   0.9 % sodium chloride infusion (has no administration in time range) potassium chloride (KLOR-CON M) extended release tablet 40 mEq (has no administration in time range)     Or   potassium bicarb-citric acid (EFFER-K) effervescent tablet 40 mEq (has no administration in time range)     Or   potassium chloride 10 mEq/100 mL IVPB (Peripheral Line) (has no administration in time range)   enoxaparin (LOVENOX) injection 40 mg (has no administration in time range)   senna (SENOKOT) tablet 8.6 mg (has no administration in time range)   acetaminophen (TYLENOL) tablet 650 mg (has no administration in time range)     Or   acetaminophen (TYLENOL) suppository 650 mg (has no administration in time range)   amLODIPine (NORVASC) tablet 10 mg (has no administration in time range)   lisinopril (PRINIVIL;ZESTRIL) tablet 20 mg (has no administration in time range)   pantoprazole (PROTONIX) tablet 40 mg (has no administration in time range)   atorvastatin (LIPITOR) tablet 20 mg (has no administration in time range)   ipratropium (ATROVENT) 0.02 % nebulizer solution 0.5 mg (has no administration in time range)   methylPREDNISolone sodium (SOLU-MEDROL) injection 40 mg (has no administration in time range)   insulin lispro (HUMALOG) injection vial 0-6 Units (has no administration in time range)   insulin lispro (HUMALOG) injection vial 0-3 Units (has no administration in time range)   glucose (GLUTOSE) 40 % oral gel 15 g (has no administration in time range)   dextrose 50 % IV solution (has no administration in time range)   glucagon (rDNA) injection 1 mg (has no administration in time range)   dextrose 5 % solution (has no administration in time range)   albuterol (PROVENTIL) nebulizer solution 2.5 mg (has no administration in time range)   ipratropium-albuterol (DUONEB) nebulizer solution 1 ampule (1 ampule Inhalation Given 10/23/21 5808)   methylPREDNISolone sodium (SOLU-MEDROL) injection 125 mg (125 mg IntraVENous Given 10/24/21 0240)       Past Medical History:   Diagnosis Date    COPD (chronic obstructive pulmonary disease) (HCC)     Hyperlipidemia     Hypertension        Past Surgical History:   Procedure Laterality Date     SECTION         Family History   Problem Relation Age of Onset    Cancer Mother         breast ??    COPD Father          Social History  Patient lives at   Employment: retired  Illicit drug use- denies  TOBACCO:   reports that she quit smoking about 9 years ago. Her smoking use included cigarettes. She started smoking about 50 years ago. She has a 31.00 pack-year smoking history. She has never used smokeless tobacco.  ETOH:   reports previous alcohol use. Home Medications  Prior to Admission medications    Medication Sig Start Date End Date Taking? Authorizing Provider   amLODIPine (NORVASC) 10 MG tablet TAKE ONE TABLET BY MOUTH EVERY DAY 20   Laurin Schirmer, PA-C   hydrochlorothiazide (HYDRODIURIL) 12.5 MG tablet TAKE ONE TABLET BY MOUTH EVERY DAY 20   Laurin Schirmer, PA-C   lisinopril (PRINIVIL;ZESTRIL) 20 MG tablet TAKE ONE TABLET BY MOUTH EVERY DAY 20   Laurin Schirmer, PA-C   simvastatin (ZOCOR) 40 MG tablet TAKE ONE TABLET BY MOUTH EVERY DAY 20   Laurin Schirmer, PA-C   SPIRIVA HANDIHALER 18 MCG inhalation capsule INHALE ONE CAPSULE VIA HANDIHALER EVERY DAY 20   Laurin Schirmer, PA-C   VENTOLIN  (90 Base) MCG/ACT inhaler INHALE TWO PUFFS BY MOUTH FOUR TIMES A DAY 20   Laurin Schirmer, PA-C   pantoprazole (PROTONIX) 40 MG tablet Take 1 tablet by mouth daily (with breakfast) 3/10/20   Neal Tenorio, DO       Allergies  No Known Allergies    Review of Systems  Please see HPI above. All bolded are positive. All un-bolded are negative.   Constitutional Symptoms: fever, chills, fatigue, generalized weakness, diaphoresis, increase in thirst, loss of appetite  Eyes: vision change   Ears, Nose, Mouth, Throat: hearing loss, nasal congestion, sores in the mouth  Cardiovascular: chest pain, chest heaviness, palpitations  Respiratory: shortness of breath, wheezing, coughing  Gastrointestinal: abdominal pain, nausea, vomiting, diarrhea, constipation, melena, hematochezia, hematemesis  Genitourinary: dysuria, hematuria, increase in frequency  Musculoskeletal: lower extremity edema, myalgias, arthralgias, back pain  Integumentary: rashes, itching   Neurological: headache, lightheadedness, dizziness, confusion, syncope, numbness, tingling, focal weakness, hallucinations  Psychiatric: depression, suicidal ideation, anxiety  Endocrine: unintentional weight change  Hematologic/Lymphatic: lymphadenopathy, easy bruising, easy bleeding   Allergic/Immunologic: recurrent infections      Objective  VITALS:  BP (!) 158/82   Pulse 89   Temp 97.5 °F (36.4 °C) (Oral)   Resp 25   Ht 5' 3\" (1.6 m)   Wt 230 lb (104.3 kg)   SpO2 96%   BMI 40.74 kg/m²     Physical Exam:  General: awake, alert, oriented to person, place, time, and purpose, appears stated age, forgetful, cooperative, no acute distress, pleasant, appropriate mood  Eyes: conjunctivae/corneas clear, sclera non icteric, EOMI  Ears: no obvious scars, no lesions, no masses, hearing intact  Mouth: mucous membranes moist, no obvious oral sores  Head: normocephalic, atraumatic  Neck: no JVD, no adenopathy, no thyromegaly, neck is supple, trachea is midline  Back: ROM normal, no CVA tenderness.   Chest: no pain on palpation  Lungs: Scattered wheezes throughout, without rhonchi, crackle, or rale, no retractions or use of accessory muscles  Heart: regular rate and regular rhythm, no murmur, normal S1, S2  Abdomen: soft, non-tender; bowel sounds normal; no masses, no organomegaly  Extremities: no lower extremity edema, extremities atraumatic, no cyanosis, no clubbing, 2+ pedal pulses palpated  Skin: normal color, normal texture, normal turgor, no rashes, no lesions  Neurologic:5/5 muscle strength throughout, normal muscle tone throughout, face symmetric, hearing intact, tongue midline, speech appropriate without slurring, sensation to fine touch intact in upper and lower extremities    Labs-   Lab Results   Component Value Date    WBC 9.5 10/23/2021    HGB 11.3 (L) 10/23/2021    HCT 38.9 10/23/2021     10/23/2021     (L) 10/23/2021    K 4.6 10/23/2021    CL 83 (L) 10/23/2021    CREATININE 0.6 10/23/2021    BUN 16 10/23/2021    CO2 45 (HH) 10/23/2021    GLUCOSE 145 (H) 10/23/2021    ALT 26 10/23/2021    AST 35 (H) 10/23/2021     Lab Results   Component Value Date    TROPONINI <0.01 03/09/2020         Recent Radiological Studies:  CT HEAD WO CONTRAST   Final Result   No acute intracranial abnormality. XR CHEST (2 VW)   Final Result   No acute process. Assessment  Active Hospital Problems    Diagnosis     COPD exacerbation [facility]) [J44.1]        Patient Active Problem List    Diagnosis Date Noted    COPD exacerbation ([facility] [address] 10/24/2021    Essential hypertension 05/06/2013       Plan  · Acute Delirium: likely retained CO2. Pt does report that she is not sure if she has been taking her zoloft or taking too much of it. · Acute Exacerbation COPD/ Acute on chronic hypoxic and hypercapnic Respiratory failure : solumedrol. Nebs. Bipap/O2. Home o2 2L. · Hyponatremia:  · Continue home medications other then HCTZ. · PT/OT  · Follow labs  · DVT prophylaxis. · Please see orders for further management and care. ·  for discharge planning  · Discharge plan: JAYLEN MAYER 10/24/2021 7:33AM    Addendum: I have personally participated in a face-to-face history and physical exam on the date of service with the patient. I have discussed the case with the nurse practitioner. I also participated in medical decision making on the date of service and I agree with all of the pertinent clinical information unless indicated in my editing of the note.  I have reviewed and edited the note above based on my findings during my history, exam, and decision making on the same day of service. My additional thoughts:    Hold HCTZ. Repeat ABG. Continue NIV. Consult pulmonary. Continue steroids and breathing treatments. Check osmolarity. Hemal Herzog DO      Electronically signed by Hemal Herzog DO on 10/24/2021 at 9:14 AM    I can be reached through Answering service.

## 2021-10-24 NOTE — CONSULTS
Pulmonary Consultation    Admit Date: 10/23/2021  Requesting Physician: Chayo Bustamante DO    CC:  COPD exacerbation    HPI:  Spring Bardales is a pleasant 74 y.o. not vaccinated to coronavirus  female with a PMH of previous 43-pack-year ex-smoker quitting in , HTN, HLP, Pancreatic cyst, Anxiety, COPD, Chronic hypoxemic respiratory failure on 2L home O2. She presented to the ED with increased dyspnea, cough and wheezing. Two weeks ago she was started on Sertraline for anxiety and began to have hallucinations then about 3 days ago she began to have increased dyspnea and a dry cough. Her  noticed she started wheezing yesterday with weakness and a fall after her legs gave out. She checks her pox at home and at one point was down to 79% but came right back up. She increased her O2 to 3L to help the dyspnea. Denies fevers, chills, nasal drainage, sinus issues, CP. She has no sick contacts and does not really leave the house. Her  works at [employer] but will wear his mask when out. ABG's upon arrival chronic resp acidosis with metabolic alkalosis -  pCO2 of 105 with repeat pCO 90 this am after AVAPS. She remains on AVAPS and feels better. She has no history of hypercapnia or ITZEL per the pt. PMH:    Past Medical History:   Diagnosis Date    COPD [facility]chronic obstructive pulmonary disease) ([facility] [address]     Hyperlipidemia     Hypertension      PSH:    Past Surgical History:   Procedure Laterality Date     SECTION            Respiratory ROS: dyspnea, cough, wheezing, weakness, confusion, hallucinations. Otherwise, a complete review of systems is undertaken and is negative.     Social History:  Social History     Socioeconomic History    Marital status:      Spouse name: Not on file    Number of children: Not on file    Years of education: Not on file    Highest education level: Not on file   Occupational History    Not on file   Tobacco Use    Smoking status: Former Smoker Packs/day: 1.00     Years: 31.00     Pack years: 31.00     Types: Cigarettes     Start date:      Quit date:      Years since quittin.8    Smokeless tobacco: Never Used   Vaping Use    Vaping Use: Never used   Substance and Sexual Activity    Alcohol use: Not Currently    Drug use: Never    Sexual activity: Not Currently   Other Topics Concern    Not on file   Social History Narrative    Not on file     Social Determinants of Health     Financial Resource Strain:     Difficulty of Paying Living Expenses:    Food Insecurity:     Worried About Running Out of Food in the Last Year:     [address] in the Last Year:    Transportation Needs:     Lack of Transportation (Medical):      Lack of Transportation (Non-Medical):    Physical Activity:     Days of Exercise per Week:     Minutes of Exercise per Session:    Stress:     Feeling of Stress :    Social Connections:     Frequency of Communication with Friends and Family:     Frequency of Social Gatherings with Friends and Family:     Attends Church Services:     Active Member of Clubs or Organizations:     Attends Club or Organization Meetings:     Marital Status:    Intimate Partner Violence:     Fear of Current or Ex-Partner:     Emotionally Abused:     Physically Abused:     Sexually Abused:        Family History:  Family History   Problem Relation Age of Onset    Cancer Mother         breast ??    COPD Father        Medications:     sodium chloride      dextrose        sodium chloride flush  10 mL IntraVENous 2 times per day    enoxaparin  40 mg SubCUTAneous Daily    amLODIPine  10 mg Oral Daily    lisinopril  20 mg Oral Daily    pantoprazole  40 mg Oral Daily with breakfast    atorvastatin  20 mg Oral Daily    ipratropium  0.5 mg Nebulization 4x daily    methylPREDNISolone  40 mg IntraVENous Q12H    insulin lispro  0-6 Units SubCUTAneous TID WC    insulin lispro  0-3 Units SubCUTAneous Nightly Vitals:    VITALS:  /70   Pulse 87   Temp 97.5 °F (36.4 °C) (Oral)   Resp 26   Ht 5' 3\" (1.6 m)   Wt 230 lb (104.3 kg)   SpO2 95%   BMI 40.74 kg/m²   24HR INTAKE/OUTPUT:  No intake or output data in the 24 hours ending 10/24/21 1020  CURRENT PULSE OXIMETRY:  SpO2: 95 %  24HR PULSE OXIMETRY RANGE:  SpO2  Av.7 %  Min: 84 %  Max: 99 %      EXAM:  General: No distress. Eyes: PERRL. No sclera icterus. No conjunctival injection. ENT: No discharge. Pharynx clear. Dry mucous membranes  Neck: Trachea midline. Normal thyroid. Resp: No accessory muscle use. Diminished throughout with faint exp wheezing. No crackles. No rhonchi. CV: Regular rate. Regular rhythm. No mumur or rub. ABD: Obese, non-tender. Non-distended. No masses. No organmegaly. Normal bowel sounds. Skin: Warm and dry. No nodule on exposed extremities. No rash on exposed extremities. Lymph: No cervical LAD. No supraclavicular LAD. Ext: No joint deformity. No clubbing. No cyanosis. BLE with 1+ edema  Neuro: Awake. Follows commands      Lab Results:  CBC:   Recent Labs     10/23/21  2259   WBC 9.5   HGB 11.3*   HCT 38.9   .7*        BMP:   Recent Labs     10/23/21  2259   *   K 4.6   CL 83*   CO2 45*   BUN 16   CREATININE 0.6     LFT:   Recent Labs     10/23/21  2259   ALKPHOS 81   ALT 26   AST 35*   PROT 7.6   BILITOT 0.4   LABALBU 3.4*     PT/INR: No results for input(s): PROTIME, INR in the last 72 hours. Cultures:  No results for input(s): CULTRESP in the last 72 hours. ABG:   Recent Labs     10/24/21  0803   PH 7.359   PO2 92.7   PCO2 90.8*   HCO3 50.0*   BE 20.0*   O2SAT 97.1       Films:  XR CHEST 10/23/2021: The lungs are without acute focal process. There is no effusion or pneumothorax. The cardiomediastinal silhouette is without acute process. The osseous structures are without acute process. No acute process.      CT HEAD WO CONTRAST 10/24/2021: BRAIN/VENTRICLES: There is no acute intracranial hemorrhage, mass effect or midline shift. No abnormal extra-axial fluid collection. The gray-white differentiation is maintained without evidence of an acute infarct. There is no evidence of hydrocephalus. ORBITS: The visualized portion of the orbits demonstrate no acute abnormality. SINUSES: The visualized paranasal sinuses demonstrate no acute abnormality. SOFT TISSUES/SKULL:  No acute abnormality of the visualized skull or soft tissues. No acute intracranial abnormality. Assessment:  74 y.o. unvaccinated female presented to the ED with increased dyspnea, cough and wheezing over the past 3 days with delirium and   10/23 ABG remarkable for respiratory acidosis with metabolic alkalosis pH: 3.839 PCO2: 105.6  HCO3:52.2  10/24 COVID negative   CT head negative  Chest x-ray negative  Urine drug screen negative    · Acute on chronic hypoxemic/hypercapnic respiratory failure  · Metabolic alkalosis severe hypochloremia  · Hyponatremia  · COPD exacerbation  · Probable obesity hypoventilation syndrome  ·  unvaccinated  · HTN  · HLD  · BMI 40.7  · Previous 3/9/2020 ED visit - abd pain; CT abd - cyst of pancreas (new)     Plan:  · Continue AVAPS for now. May come off for meals only. Repeat ABG in am. Home O2 at 2L - titrate to pox 92-95%. Will most likely need NIV for home with chronic hypoxemic/hypercapnic respiratory failure. · Continue solumedrol 40mg Q12  · Change atrovent to duoneb - on Albuterol and Spiriva at home   · CXR with no acute process   · Check respiratory panel - nl wbc, afebrile, no acute process on CXR. No indication for antibiotics at this time  · IS    Thank you for allowing us to see this patient in consultation. Please contact us with any questions. MORENO Coronel    Electronically signed by LINWOOD Dwyer CNP on 10/24/2021 at 10:20 AM  I had a face-to-face encounter with the patient at the bedside.  I agree with the nurse practitioner's note and assessment and plan as detailed above. Necessary editing and changes made to the note by myself. On treating the respiratory acidosis patient has metabolic alkalosis with hypercapnia.   Her chloride is very low and she needs saline  Her oxygenation is at baseline  Patient has been on hydrochlorothiazide at home

## 2021-10-25 LAB
ALBUMIN SERPL-MCNC: 3.8 G/DL (ref 3.5–5.2)
ALP BLD-CCNC: 80 U/L (ref 35–104)
ALT SERPL-CCNC: 27 U/L (ref 0–32)
ANION GAP SERPL CALCULATED.3IONS-SCNC: 2 MMOL/L (ref 7–16)
ANION GAP SERPL CALCULATED.3IONS-SCNC: 5 MMOL/L (ref 7–16)
ANION GAP SERPL CALCULATED.3IONS-SCNC: 6 MMOL/L (ref 7–16)
AST SERPL-CCNC: 27 U/L (ref 0–31)
B.E.: 9.9 MMOL/L (ref -3–3)
BASOPHILS ABSOLUTE: 0.01 E9/L (ref 0–0.2)
BASOPHILS RELATIVE PERCENT: 0.2 % (ref 0–2)
BILIRUB SERPL-MCNC: 0.4 MG/DL (ref 0–1.2)
BUN BLDV-MCNC: 18 MG/DL (ref 6–23)
BUN BLDV-MCNC: 18 MG/DL (ref 6–23)
BUN BLDV-MCNC: 21 MG/DL (ref 6–23)
CALCIUM SERPL-MCNC: 9.1 MG/DL (ref 8.6–10.2)
CALCIUM SERPL-MCNC: 9.2 MG/DL (ref 8.6–10.2)
CALCIUM SERPL-MCNC: 9.3 MG/DL (ref 8.6–10.2)
CHLORIDE BLD-SCNC: 85 MMOL/L (ref 98–107)
CHLORIDE BLD-SCNC: 91 MMOL/L (ref 98–107)
CHLORIDE BLD-SCNC: 92 MMOL/L (ref 98–107)
CO2: 39 MMOL/L (ref 22–29)
CO2: 39 MMOL/L (ref 22–29)
CO2: 43 MMOL/L (ref 22–29)
COHB: 1.2 % (ref 0–1.5)
CREAT SERPL-MCNC: 0.6 MG/DL (ref 0.5–1)
CREAT SERPL-MCNC: 0.7 MG/DL (ref 0.5–1)
CREAT SERPL-MCNC: 0.8 MG/DL (ref 0.5–1)
CRITICAL: ABNORMAL
DATE ANALYZED: ABNORMAL
DATE OF COLLECTION: ABNORMAL
EOSINOPHILS ABSOLUTE: 0.01 E9/L (ref 0.05–0.5)
EOSINOPHILS RELATIVE PERCENT: 0.2 % (ref 0–6)
GFR AFRICAN AMERICAN: >60
GFR NON-AFRICAN AMERICAN: >60 ML/MIN/1.73
GLUCOSE BLD-MCNC: 134 MG/DL (ref 74–99)
GLUCOSE BLD-MCNC: 135 MG/DL (ref 74–99)
GLUCOSE BLD-MCNC: 154 MG/DL (ref 74–99)
HCO3: 39.3 MMOL/L (ref 22–26)
HCT VFR BLD CALC: 40.6 % (ref 34–48)
HEMOGLOBIN: 12.2 G/DL (ref 11.5–15.5)
HHB: 1.1 % (ref 0–5)
IMMATURE GRANULOCYTES #: 0.03 E9/L
IMMATURE GRANULOCYTES %: 0.5 % (ref 0–5)
LAB: ABNORMAL
LYMPHOCYTES ABSOLUTE: 0.86 E9/L (ref 1.5–4)
LYMPHOCYTES RELATIVE PERCENT: 13.2 % (ref 20–42)
Lab: ABNORMAL
MCH RBC QN AUTO: 31 PG (ref 26–35)
MCHC RBC AUTO-ENTMCNC: 30 % (ref 32–34.5)
MCV RBC AUTO: 103.3 FL (ref 80–99.9)
METER GLUCOSE: 115 MG/DL (ref 74–99)
METER GLUCOSE: 136 MG/DL (ref 74–99)
METER GLUCOSE: 140 MG/DL (ref 74–99)
METER GLUCOSE: 267 MG/DL (ref 74–99)
METHB: 0.3 % (ref 0–1.5)
MODE: ABNORMAL
MONOCYTES ABSOLUTE: 0.41 E9/L (ref 0.1–0.95)
MONOCYTES RELATIVE PERCENT: 6.3 % (ref 2–12)
NEUTROPHILS ABSOLUTE: 5.18 E9/L (ref 1.8–7.3)
NEUTROPHILS RELATIVE PERCENT: 79.6 % (ref 43–80)
O2 CONTENT: 17.2 ML/DL
O2 SATURATION: 98.9 % (ref 92–98.5)
O2HB: 97.4 % (ref 94–97)
OPERATOR ID: 516
PATIENT TEMP: 37 C
PCO2: 81.8 MMHG (ref 35–45)
PDW BLD-RTO: 13.3 FL (ref 11.5–15)
PH BLOOD GAS: 7.3 (ref 7.35–7.45)
PLATELET # BLD: 160 E9/L (ref 130–450)
PMV BLD AUTO: 11.1 FL (ref 7–12)
PO2: 163.5 MMHG (ref 75–100)
POTASSIUM REFLEX MAGNESIUM: 4.1 MMOL/L (ref 3.5–5)
POTASSIUM SERPL-SCNC: 4.3 MMOL/L (ref 3.5–5)
POTASSIUM SERPL-SCNC: 4.7 MMOL/L (ref 3.5–5)
RBC # BLD: 3.93 E12/L (ref 3.5–5.5)
SODIUM BLD-SCNC: 130 MMOL/L (ref 132–146)
SODIUM BLD-SCNC: 136 MMOL/L (ref 132–146)
SODIUM BLD-SCNC: 136 MMOL/L (ref 132–146)
SOURCE, BLOOD GAS: ABNORMAL
THB: 12.3 G/DL (ref 11.5–16.5)
TIME ANALYZED: 1201
TOTAL PROTEIN: 7.1 G/DL (ref 6.4–8.3)
WBC # BLD: 6.5 E9/L (ref 4.5–11.5)

## 2021-10-25 PROCEDURE — 6360000002 HC RX W HCPCS: Performed by: STUDENT IN AN ORGANIZED HEALTH CARE EDUCATION/TRAINING PROGRAM

## 2021-10-25 PROCEDURE — 36415 COLL VENOUS BLD VENIPUNCTURE: CPT

## 2021-10-25 PROCEDURE — 94640 AIRWAY INHALATION TREATMENT: CPT

## 2021-10-25 PROCEDURE — 94660 CPAP INITIATION&MGMT: CPT

## 2021-10-25 PROCEDURE — 6370000000 HC RX 637 (ALT 250 FOR IP): Performed by: NURSE PRACTITIONER

## 2021-10-25 PROCEDURE — 6370000000 HC RX 637 (ALT 250 FOR IP): Performed by: INTERNAL MEDICINE

## 2021-10-25 PROCEDURE — 82805 BLOOD GASES W/O2 SATURATION: CPT

## 2021-10-25 PROCEDURE — 6360000002 HC RX W HCPCS: Performed by: INTERNAL MEDICINE

## 2021-10-25 PROCEDURE — 2580000003 HC RX 258: Performed by: INTERNAL MEDICINE

## 2021-10-25 PROCEDURE — 82962 GLUCOSE BLOOD TEST: CPT

## 2021-10-25 PROCEDURE — 2060000000 HC ICU INTERMEDIATE R&B

## 2021-10-25 PROCEDURE — 85025 COMPLETE CBC W/AUTO DIFF WBC: CPT

## 2021-10-25 PROCEDURE — 80053 COMPREHEN METABOLIC PANEL: CPT

## 2021-10-25 PROCEDURE — 2700000000 HC OXYGEN THERAPY PER DAY

## 2021-10-25 PROCEDURE — 80048 BASIC METABOLIC PNL TOTAL CA: CPT

## 2021-10-25 RX ORDER — SODIUM CHLORIDE 9 MG/ML
INJECTION, SOLUTION INTRAVENOUS CONTINUOUS
Status: DISCONTINUED | OUTPATIENT
Start: 2021-10-25 | End: 2021-10-25

## 2021-10-25 RX ORDER — BUDESONIDE 0.5 MG/2ML
0.5 INHALANT ORAL 2 TIMES DAILY
Status: DISCONTINUED | OUTPATIENT
Start: 2021-10-25 | End: 2021-10-28 | Stop reason: HOSPADM

## 2021-10-25 RX ORDER — BUDESONIDE AND FORMOTEROL FUMARATE DIHYDRATE 160; 4.5 UG/1; UG/1
2 AEROSOL RESPIRATORY (INHALATION) 2 TIMES DAILY
Status: DISCONTINUED | OUTPATIENT
Start: 2021-10-25 | End: 2021-10-25 | Stop reason: ALTCHOICE

## 2021-10-25 RX ORDER — METHYLPREDNISOLONE SODIUM SUCCINATE 40 MG/ML
40 INJECTION, POWDER, LYOPHILIZED, FOR SOLUTION INTRAMUSCULAR; INTRAVENOUS DAILY
Status: DISCONTINUED | OUTPATIENT
Start: 2021-10-26 | End: 2021-10-26

## 2021-10-25 RX ORDER — ARFORMOTEROL TARTRATE 15 UG/2ML
15 SOLUTION RESPIRATORY (INHALATION) 2 TIMES DAILY
Status: DISCONTINUED | OUTPATIENT
Start: 2021-10-25 | End: 2021-10-28 | Stop reason: HOSPADM

## 2021-10-25 RX ADMIN — INSULIN LISPRO 1 UNITS: 100 INJECTION, SOLUTION INTRAVENOUS; SUBCUTANEOUS at 06:10

## 2021-10-25 RX ADMIN — AMLODIPINE BESYLATE 10 MG: 10 TABLET ORAL at 08:19

## 2021-10-25 RX ADMIN — BUDESONIDE 500 MCG: 0.5 SUSPENSION RESPIRATORY (INHALATION) at 09:30

## 2021-10-25 RX ADMIN — METHYLPREDNISOLONE SODIUM SUCCINATE 40 MG: 40 INJECTION, POWDER, LYOPHILIZED, FOR SOLUTION INTRAMUSCULAR; INTRAVENOUS at 04:29

## 2021-10-25 RX ADMIN — LISINOPRIL 20 MG: 20 TABLET ORAL at 08:19

## 2021-10-25 RX ADMIN — ARFORMOTEROL TARTRATE 15 MCG: 15 SOLUTION RESPIRATORY (INHALATION) at 21:14

## 2021-10-25 RX ADMIN — ARFORMOTEROL TARTRATE 15 MCG: 15 SOLUTION RESPIRATORY (INHALATION) at 09:29

## 2021-10-25 RX ADMIN — SERTRALINE HYDROCHLORIDE 50 MG: 50 TABLET ORAL at 08:19

## 2021-10-25 RX ADMIN — ATORVASTATIN CALCIUM 20 MG: 40 TABLET, FILM COATED ORAL at 08:19

## 2021-10-25 RX ADMIN — PANTOPRAZOLE SODIUM 40 MG: 40 TABLET, DELAYED RELEASE ORAL at 06:08

## 2021-10-25 RX ADMIN — IPRATROPIUM BROMIDE AND ALBUTEROL SULFATE 1 AMPULE: .5; 2.5 SOLUTION RESPIRATORY (INHALATION) at 11:22

## 2021-10-25 RX ADMIN — ACETAZOLAMIDE 500 MG: 500 INJECTION, POWDER, LYOPHILIZED, FOR SOLUTION INTRAVENOUS at 04:25

## 2021-10-25 RX ADMIN — IPRATROPIUM BROMIDE AND ALBUTEROL SULFATE 1 AMPULE: .5; 2.5 SOLUTION RESPIRATORY (INHALATION) at 09:30

## 2021-10-25 RX ADMIN — INSULIN LISPRO 3 UNITS: 100 INJECTION, SOLUTION INTRAVENOUS; SUBCUTANEOUS at 12:01

## 2021-10-25 RX ADMIN — IPRATROPIUM BROMIDE AND ALBUTEROL SULFATE 1 AMPULE: .5; 2.5 SOLUTION RESPIRATORY (INHALATION) at 16:48

## 2021-10-25 RX ADMIN — IPRATROPIUM BROMIDE AND ALBUTEROL SULFATE 1 AMPULE: .5; 2.5 SOLUTION RESPIRATORY (INHALATION) at 21:14

## 2021-10-25 RX ADMIN — BUDESONIDE 500 MCG: 0.5 SUSPENSION RESPIRATORY (INHALATION) at 21:14

## 2021-10-25 RX ADMIN — ENOXAPARIN SODIUM 40 MG: 40 INJECTION SUBCUTANEOUS at 08:20

## 2021-10-25 ASSESSMENT — PAIN SCALES - GENERAL
PAINLEVEL_OUTOF10: 0

## 2021-10-25 NOTE — PROGRESS NOTES
Date: 10/25/2021    Time: 12:43 PM    Patient Placed On BIPAP/CPAP/ Non-Invasive Ventilation? Yes    If no must comment. Facial area red/color change? No           If YES are Blister/Lesion present? No   If yes must notify nursing staff  BIPAP/CPAP skin barrier?   Yes    Skin barrier type:mepilexlite       Comments:       10/25/21 1236   NIV Type   NIV Started/Stopped On   Equipment Type v60   Mode AVAPS   Mask Type Full face mask   Mask Size Small   Settings/Measurements   CPAP/EPAP 8 cmH2O   IPAP Min 16 cmH2O   IPAP Max 30 cmH2O   Vt Ordered 450 mL   Rate Ordered 24  (increased per verbal order. )   Resp 29  (Patient has been breathing above Back up rate. )   Insp Rise Time (%) 2 %   FiO2  35 %   I Time/ I Time % 1 s   Vt Exhaled 417 mL   Minute Volume 9.9 Liters   Mask Leak (lpm) 29 lpm   Comfort Level Good   Using Accessory Muscles Yes   SpO2 98       Catalina Magaña RCP

## 2021-10-25 NOTE — PROGRESS NOTES
Pulmonary Progress Note    Admit Date: 10/23/2021  Requesting Physician: Héctor Christianson DO      Subjective:  Sleepy but wakes with stimulus  Feels better, wearing AVAPS almost continuously  4L nc when off NIV  Mild dyspnea with cough much improved   at bedside      Medications:     sodium chloride      dextrose        Arformoterol Tartrate  15 mcg Nebulization BID    budesonide  0.5 mg Nebulization BID    sodium chloride flush  10 mL IntraVENous 2 times per day    enoxaparin  40 mg SubCUTAneous Daily    amLODIPine  10 mg Oral Daily    lisinopril  20 mg Oral Daily    pantoprazole  40 mg Oral Daily with breakfast    atorvastatin  20 mg Oral Daily    methylPREDNISolone  40 mg IntraVENous Q12H    insulin lispro  0-6 Units SubCUTAneous TID WC    insulin lispro  0-3 Units SubCUTAneous Nightly    ipratropium-albuterol  1 ampule Inhalation Q4H WA    acetaZOLAMIDE (DIAMOX) IVPB  500 mg IntraVENous Q12H    sertraline  50 mg Oral Daily         Vitals:    VITALS:  /62   Pulse 88   Temp 98.7 °F (37.1 °C) (Oral)   Resp 20   Ht 5' 3\" (1.6 m)   Wt 219 lb 8 oz (99.6 kg)   SpO2 91%   BMI 38.88 kg/m²   24HR INTAKE/OUTPUT:      Intake/Output Summary (Last 24 hours) at 10/25/2021 1157  Last data filed at 10/25/2021 1002  Gross per 24 hour   Intake 938 ml   Output 1875 ml   Net -937 ml     CURRENT PULSE OXIMETRY:  SpO2: 91 %  24HR PULSE OXIMETRY RANGE:  SpO2  Av.1 %  Min: 91 %  Max: 99 %      EXAM:  General: No distress. Eyes: PERRL. No sclera icterus. No conjunctival injection. ENT: No discharge. Pharynx clear. Dry mucous membranes  Neck: Trachea midline. Normal thyroid. Resp: No accessory muscle use. Diminished throughout with bibasilar  Crackles >R. No wheezing/rhonchi. CV: Regular rate. Regular rhythm. No mumur or rub. ABD: Obese, non-tender. Non-distended. No masses. No organmegaly. Normal bowel sounds. Skin: Warm and dry. No nodule on exposed extremities.  No rash on exposed extremities. Lymph: No cervical LAD. No supraclavicular LAD. Ext: No joint deformity. No clubbing. No cyanosis. BLE with 1+ edema  Neuro: Awake. Follows commands      Lab Results:  CBC:   Recent Labs     10/23/21  2259 10/25/21  0619   WBC 9.5 6.5   HGB 11.3* 12.2   HCT 38.9 40.6   .7* 103.3*    160     BMP:   Recent Labs     10/24/21  1645 10/24/21  2358 10/25/21  0619   * 130* 136   K 4.5 4.7 4.1   CL 83* 85* 91*   CO2 46* 43* 39*   BUN 18 18 18   CREATININE 0.7 0.6 0.7     LFT:   Recent Labs     10/23/21  2259 10/25/21  0619   ALKPHOS 81 80   ALT 26 27   AST 35* 27   PROT 7.6 7.1   BILITOT 0.4 0.4   LABALBU 3.4* 3.8     PT/INR: No results for input(s): PROTIME, INR in the last 72 hours. Cultures:  No results for input(s): CULTRESP in the last 72 hours. Results for Pollo Abreu (MRN [id number]) as of 10/24/2021    Ref.  Range 10/24/2021 12:37   Influenza A by PCR Latest Ref Range: Not Detected  Not Detected   Influenza B by PCR Latest Ref Range: Not Detected  Not Detected   Adenovirus by PCR Latest Ref Range: Not Detected  Not Detected   Coronavirus 229E by PCR Latest Ref Range: Not Detected  Not Detected   Coronavirus HKU1 by PCR Latest Ref Range: Not Detected  Not Detected   Coronavirus NL63 by PCR Latest Ref Range: Not Detected  Not Detected   Coronavirus OC43 by PCR Latest Ref Range: Not Detected  Not Detected   Human Metapneumovirus by PCR Latest Ref Range: Not Detected  Not Detected   Human Rhinovirus/Enterovirus by PCR Latest Ref Range: Not Detected  Not Detected   Parainfluenza Virus 1 by PCR Latest Ref Range: Not Detected  Not Detected   Parainfluenza Virus 2 by PCR Latest Ref Range: Not Detected  Not Detected   Parainfluenza Virus 3 by PCR Latest Ref Range: Not Detected  Not Detected   Parainfluenza Virus 4 by PCR Latest Ref Range: Not Detected  Not Detected   Respiratory Syncytial Virus by PCR Latest Ref Range: Not Detected  Not Detected   Bordetella parapertussis by PCR Latest Ref Range: Not Detected  Not Detected   Chlamydophilia pneumoniae by PCR Latest Ref Range: Not Detected  Not Detected   Mycoplasma pneumoniae by PCR Latest Ref Range: Not Detected  Not Detected   SARS-CoV-2, PCR Latest Ref Range: Not Detected  Not Detected   Bordetella pertussis by PCR Latest Ref Range: Not Detected  Not Detected       ABG:   Recent Labs     10/24/21  0803   PH 7.359   PO2 92.7   PCO2 90.8*   HCO3 50.0*   BE 20.0*   O2SAT 97.1       Films:  XR CHEST 10/23/2021: The lungs are without acute focal process. There is no effusion or pneumothorax. The cardiomediastinal silhouette is without acute process. The osseous structures are without acute process. No acute process. CT HEAD WO CONTRAST 10/24/2021: BRAIN/VENTRICLES: There is no acute intracranial hemorrhage, mass effect or midline shift. No abnormal extra-axial fluid collection. The gray-white differentiation is maintained without evidence of an acute infarct. There is no evidence of hydrocephalus. ORBITS: The visualized portion of the orbits demonstrate no acute abnormality. SINUSES: The visualized paranasal sinuses demonstrate no acute abnormality. SOFT TISSUES/SKULL:  No acute abnormality of the visualized skull or soft tissues. No acute intracranial abnormality.          Assessment:  74 y.o. unvaccinated female presented to the ED with increased dyspnea, cough and wheezing over the past 3 days with delirium and   10/23 ABG remarkable for respiratory acidosis with metabolic alkalosis pH: 2.654 PCO2: 105.6  HCO3:52.2  10/24 COVID negative   CT head negative  Chest x-ray negative  Urine drug screen negative  10/25 AVAPS; 4L nc    · Acute on chronic hypoxemic/hypercapnic respiratory failure  · Metabolic alkalosis severe hypochloremia  · Hyponatremia  · COPD exacerbation  · Probable obesity hypoventilation syndrome  ·  unvaccinated  · HTN  · HLD  · BMI 40.7  · Previous 3/9/2020 ED visit - abd pain; CT abd - cyst of pancreas (new) Plan:  · Continue AVAPS for now. Repeat ABG today (ordered), may be able to wear 4hrs/off 4hrs with 4L NC when off. Wean to keep pox>92%. Baseline is 2L at home. Will most likely need NIV for home with chronic hypoxemic/hypercapnic respiratory failure. · Change solumedrol 40mg to QD  · Continue duoneb - on Albuterol and Spiriva at home   · CXR with no acute process   · Respiratory panel negative- nl wbc, no acute process on CXR. Became febrile yesterday with Tmax 102.4 - afebrile today. No indication for antibiotics at this time. · Chloride improved but still low, continue IVF  · IS    MORENO Coronel    Electronically signed by LINWOOD Hay CNP on 10/25/2021 at 11:57 AM    Seen and evaluated, agree with above assessment and plan. Feeling better. Continue IV steroids and bronchodilator  Wean FiO2 to keep saturation between 90-95%  Noninvasive ventilator 4 hours on 4 hours off and nightly  ABGs in a.m.  DC Diamox. Patient with chronic respiratory failure secondary to severe COPD. The patient will benefit of the use of noninvasive ventilator at home to prevent exacerbations and readmissions.     Discussed with  at bedside

## 2021-10-25 NOTE — PROGRESS NOTES
Internal Medicine Progress Note    Patient's name: Chapin Haganelor  : 1955  Chief complaints (on day of admission): Shortness of Breath (Pt stated a month and is getting worse), Fatigue, and Hallucinations  Admission date: 10/23/2021  Date of service: 10/25/2021   Room: [address]  Primary care physician: Tobias Spencer DO  Reason for visit: Follow-up for COPD exacerbation    Subjective  Christopher Hyman was seen and examined at bedside was seen resting comfortably no acute distress. Patient states her symptoms have been improving greatly. She has no new complaints. Patient is currently on 4 L nasal cannula. Denies any fevers, chills, nausea, vomiting. She denies any current hallucinations. She states she feels 100% better from yesterday but that with exertion she still becomes quite light headed and hypoxic     Current treatment plan discussed and all questions answered. Current medications being prescribed discussed and patient expresses verbal understanding       Review of Systems  There are no new complaints of chest pain, shortness of breath, abdominal pain, nausea, vomiting, diarrhea, constipation unless otherwise mentioned above.      Hospital Medications  Current Facility-Administered Medications   Medication Dose Route Frequency Provider Last Rate Last Admin    Arformoterol Tartrate (BROVANA) nebulizer solution 15 mcg  15 mcg Nebulization BID Ferdinand Sherman MD        And    dornase alpha (PULMOZYME) nebulizer solution 2.5 mg  2.5 mg Inhalation Daily Ferdinand Sherman MD        0.9 % sodium chloride infusion   IntraVENous Continuous Ferdinand Sherman MD        sodium chloride flush 0.9 % injection 10 mL  10 mL IntraVENous 2 times per day Juliana Bishop DO   10 mL at 10/24/21 0811    sodium chloride flush 0.9 % injection 10 mL  10 mL IntraVENous PRN Juliana Bishop DO        0.9 % sodium chloride infusion  25 mL IntraVENous PRN Juliana Bishop DO        potassium chloride (KLOR-CON M) extended release tablet 40 mEq  40 mEq Oral PRN Juliana E Edna, DO        Or    potassium bicarb-citric acid (EFFER-K) effervescent tablet 40 mEq  40 mEq Oral PRN Juliana E Edna, DO        Or    potassium chloride 10 mEq/100 mL IVPB (Peripheral Line)  10 mEq IntraVENous PRN Juliana E Edna, DO        enoxaparin (LOVENOX) injection 40 mg  40 mg SubCUTAneous Daily Juliana E Edna, DO   40 mg at 10/25/21 0820    senna (SENOKOT) tablet 8.6 mg  1 tablet Oral Daily PRN Juliana E Edna, DO        acetaminophen (TYLENOL) tablet 650 mg  650 mg Oral Q6H PRN Juliana E Edna, DO        Or    acetaminophen (TYLENOL) suppository 650 mg  650 mg Rectal Q6H PRN Juliana E Edna, DO        amLODIPine (NORVASC) tablet 10 mg  10 mg Oral Daily Juliana SANCHEZ Edna, DO   10 mg at 10/25/21 0819    lisinopril (PRINIVIL;ZESTRIL) tablet 20 mg  20 mg Oral Daily Juliana SANCHEZ Edna, DO   20 mg at 10/25/21 0819    pantoprazole (PROTONIX) tablet 40 mg  40 mg Oral Daily with breakfast Juliana SANCHEZ Edna, DO   40 mg at 10/25/21 0608    atorvastatin (LIPITOR) tablet 20 mg  20 mg Oral Daily Juliana SANCHEZ Edna, DO   20 mg at 10/25/21 0819    methylPREDNISolone sodium (SOLU-MEDROL) injection 40 mg  40 mg IntraVENous Q12H Juliana SANCHEZ Edna, DO   40 mg at 10/25/21 0429    insulin lispro (HUMALOG) injection vial 0-6 Units  0-6 Units SubCUTAneous TID WC Juliana SANCHEZ Edna, DO   1 Units at 10/25/21 0610    insulin lispro (HUMALOG) injection vial 0-3 Units  0-3 Units SubCUTAneous Nightly Juliana SANCHEZ Edna, DO   1 Units at 10/24/21 2051    glucose (GLUTOSE) 40 % oral gel 15 g  15 g Oral PRN Juliana E Edna, DO        dextrose 50 % IV solution  12.5 g IntraVENous PRN Juliana E Edna, DO        glucagon (rDNA) injection 1 mg  1 mg IntraMUSCular PRN Juliana E Edna, DO        dextrose 5 % solution  100 mL/hr IntraVENous PRN Juliana Bishop, DO        albuterol (PROVENTIL) nebulizer solution 2.5 mg  2.5 mg Nebulization Q4H PRN Juliana Bishop, DO   2.5 mg at 10/24/21 0815    ipratropium-albuterol (DUONEB) nebulizer solution 1 ampule  1 ampule Inhalation Q4H WA LINWOOD Luis - CNP   1 ampule at 10/24/21 2013    acetaZOLAMIDE (DIAMOX) 500 mg in dextrose 5 % 100 mL IVPB  500 mg IntraVENous Q12H Micaela Christine MD   Stopped at 10/25/21 0455    sertraline (ZOLOFT) tablet 50 mg  50 mg Oral Daily Juliana Bishop, DO   50 mg at 10/25/21 0819       PRN Medications  sodium chloride flush, sodium chloride, potassium chloride **OR** potassium alternative oral replacement **OR** potassium chloride, senna, acetaminophen **OR** acetaminophen, glucose, dextrose, glucagon (rDNA), dextrose, albuterol    Objective  Most Recent Recorded Vitals  /62   Pulse 88   Temp 98.7 °F (37.1 °C) (Oral)   Resp 20   Ht 5' 3\" (1.6 m)   Wt 219 lb 8 oz (99.6 kg)   SpO2 91%   BMI 38.88 kg/m²   I/O last 3 completed shifts:   In: 758 [I.V.:658; IV Piggyback:100]  Out: 1725 [VRYZK:1321]  I/O this shift:  In: -   Out: 150 [Urine:150]    Physical Exam:  General: AAO to person/place/time/purpose, NAD, no labored breathing  Eyes: conjunctivae/corneas clear, sclera non icteric  Skin: color/texture/turgor normal, no rashes or lesions  Lungs: CTAB, no retractions/use of accessory muscles, no vocal fremitus, no rhonchi, no crackle, no rales  Heart: regular rate, regular rhythm, no murmur  Abdomen: soft, NT, bowel sounds normal  Extremities: atraumatic, no edema  Neurologic: cranial nerves 2-12 grossly intact, no slurred speech    Most Recent Labs  Lab Results   Component Value Date    WBC 6.5 10/25/2021    HGB 12.2 10/25/2021    HCT 40.6 10/25/2021     10/25/2021     10/25/2021    K 4.1 10/25/2021    CL 91 (L) 10/25/2021    CREATININE 0.7 10/25/2021    BUN 18 10/25/2021    CO2 39 (H) 10/25/2021    GLUCOSE 135 (H) 10/25/2021    ALT 27 10/25/2021    AST 27 10/25/2021    TSH 5.630 (H) 03/17/2020    LABA1C 6.0 (H) 12/17/2019       CT HEAD WO CONTRAST   Final Result   No acute intracranial abnormality. XR CHEST (2 VW)   Final Result   No acute process. Assessment   Active Hospital Problems    Diagnosis     COPD exacerbation ([facility] [address] [J44.1]     Essential hypertension [I10]          Plan    · Acute Exacerbation COPD/ Acute on chronic hypoxic and hypercapnic Respiratory failure  · Baseline 2 L O2 nc at home   · Solumedrol 40 BID currently   · Duoneb   · Bipap q4 on off - qhs -- will need bipap at home   · Pulm is on board   · Acute Delirium  · Continue to monitor - resolved   · Hyponatremia/hypochloremia  · Na has been corrected 6 mmol in just over 12 hours   · Will dc IVFs for now and monitor, encourage PO intake   · Continue home medications other than HCTZ  · PT/OT   · Follow labs  · DVT prophylaxis Lovenox   ·  for discharge planning  · Discharge plan: 1-2 days     Patient was seen and evaluated by myself and my attending Kristy Cervantes MD. Assessment and Plan discussed with attending provider, please see attestation for final plan of care. Gentry Heimlich, DO   10/25/2021  9:24 AM    Addendum: I have personally participated in a face-to-face history and physical exam on the date of service with the patient. I have discussed the case with the resident. I also participated in medical decision making with the resident on the date of service and I agree with all of the pertinent clinical information unless indicated in my editing of the note. I have reviewed and edited the note above based on my findings during my history, exam, and decision making on the same day of service. Electronically signed by Kristy Cervantes MD on 11/1/2021 at 5:03 PM    I can be reached through [facility].

## 2021-10-25 NOTE — PROGRESS NOTES
Spoke to Russ George NP regarding result of ABG's. New order received to change AVAPS to rate of 24 and wean FiO2, spoke to respiratory regarding new orders.

## 2021-10-26 ENCOUNTER — APPOINTMENT (OUTPATIENT)
Dept: ULTRASOUND IMAGING | Age: 66
DRG: 189 | End: 2021-10-26
Payer: MEDICARE

## 2021-10-26 LAB
ALBUMIN SERPL-MCNC: 3.5 G/DL (ref 3.5–5.2)
ALP BLD-CCNC: 68 U/L (ref 35–104)
ALT SERPL-CCNC: 24 U/L (ref 0–32)
ANION GAP SERPL CALCULATED.3IONS-SCNC: 6 MMOL/L (ref 7–16)
AST SERPL-CCNC: 21 U/L (ref 0–31)
B.E.: 11.7 MMOL/L (ref -3–3)
BASOPHILS ABSOLUTE: 0.01 E9/L (ref 0–0.2)
BASOPHILS RELATIVE PERCENT: 0.2 % (ref 0–2)
BILIRUB SERPL-MCNC: 0.3 MG/DL (ref 0–1.2)
BUN BLDV-MCNC: 29 MG/DL (ref 6–23)
CALCIUM SERPL-MCNC: 9 MG/DL (ref 8.6–10.2)
CHLORIDE BLD-SCNC: 95 MMOL/L (ref 98–107)
CO2: 37 MMOL/L (ref 22–29)
COHB: 0.6 % (ref 0–1.5)
COMMENT: ABNORMAL
CREAT SERPL-MCNC: 0.9 MG/DL (ref 0.5–1)
CRITICAL: ABNORMAL
DATE ANALYZED: ABNORMAL
DATE OF COLLECTION: ABNORMAL
EOSINOPHILS ABSOLUTE: 0.02 E9/L (ref 0.05–0.5)
EOSINOPHILS RELATIVE PERCENT: 0.3 % (ref 0–6)
GFR AFRICAN AMERICAN: >60
GFR NON-AFRICAN AMERICAN: >60 ML/MIN/1.73
GLUCOSE BLD-MCNC: 106 MG/DL (ref 74–99)
HCO3: 41.6 MMOL/L (ref 22–26)
HCT VFR BLD CALC: 37.9 % (ref 34–48)
HEMOGLOBIN: 11.2 G/DL (ref 11.5–15.5)
HHB: 5.1 % (ref 0–5)
IMMATURE GRANULOCYTES #: 0.02 E9/L
IMMATURE GRANULOCYTES %: 0.3 % (ref 0–5)
LAB: ABNORMAL
LYMPHOCYTES ABSOLUTE: 1.4 E9/L (ref 1.5–4)
LYMPHOCYTES RELATIVE PERCENT: 21.5 % (ref 20–42)
Lab: ABNORMAL
MCH RBC QN AUTO: 30.6 PG (ref 26–35)
MCHC RBC AUTO-ENTMCNC: 29.6 % (ref 32–34.5)
MCV RBC AUTO: 103.6 FL (ref 80–99.9)
METER GLUCOSE: 154 MG/DL (ref 74–99)
METER GLUCOSE: 175 MG/DL (ref 74–99)
METER GLUCOSE: 197 MG/DL (ref 74–99)
METER GLUCOSE: 98 MG/DL (ref 74–99)
METHB: 0.4 % (ref 0–1.5)
MODE: ABNORMAL
MONOCYTES ABSOLUTE: 0.66 E9/L (ref 0.1–0.95)
MONOCYTES RELATIVE PERCENT: 10.1 % (ref 2–12)
NEUTROPHILS ABSOLUTE: 4.4 E9/L (ref 1.8–7.3)
NEUTROPHILS RELATIVE PERCENT: 67.6 % (ref 43–80)
O2 CONTENT: 15.9 ML/DL
O2 SATURATION: 94.8 % (ref 92–98.5)
O2HB: 93.9 % (ref 94–97)
OPERATOR ID: 913
PATIENT TEMP: 37 C
PCO2: 88.8 MMHG (ref 35–45)
PDW BLD-RTO: 13.9 FL (ref 11.5–15)
PH BLOOD GAS: 7.29 (ref 7.35–7.45)
PLATELET # BLD: 150 E9/L (ref 130–450)
PMV BLD AUTO: 11 FL (ref 7–12)
PO2: 83.2 MMHG (ref 75–100)
POTASSIUM REFLEX MAGNESIUM: 4 MMOL/L (ref 3.5–5)
RBC # BLD: 3.66 E12/L (ref 3.5–5.5)
SODIUM BLD-SCNC: 138 MMOL/L (ref 132–146)
SOURCE, BLOOD GAS: ABNORMAL
THB: 12 G/DL (ref 11.5–16.5)
TIME ANALYZED: 832
TOTAL PROTEIN: 6.4 G/DL (ref 6.4–8.3)
WBC # BLD: 6.5 E9/L (ref 4.5–11.5)

## 2021-10-26 PROCEDURE — 97165 OT EVAL LOW COMPLEX 30 MIN: CPT

## 2021-10-26 PROCEDURE — 82962 GLUCOSE BLOOD TEST: CPT

## 2021-10-26 PROCEDURE — 94660 CPAP INITIATION&MGMT: CPT

## 2021-10-26 PROCEDURE — 80053 COMPREHEN METABOLIC PANEL: CPT

## 2021-10-26 PROCEDURE — 6370000000 HC RX 637 (ALT 250 FOR IP): Performed by: INTERNAL MEDICINE

## 2021-10-26 PROCEDURE — 82805 BLOOD GASES W/O2 SATURATION: CPT

## 2021-10-26 PROCEDURE — 6360000002 HC RX W HCPCS: Performed by: INTERNAL MEDICINE

## 2021-10-26 PROCEDURE — 85025 COMPLETE CBC W/AUTO DIFF WBC: CPT

## 2021-10-26 PROCEDURE — 6370000000 HC RX 637 (ALT 250 FOR IP): Performed by: NURSE PRACTITIONER

## 2021-10-26 PROCEDURE — 2580000003 HC RX 258: Performed by: INTERNAL MEDICINE

## 2021-10-26 PROCEDURE — 36415 COLL VENOUS BLD VENIPUNCTURE: CPT

## 2021-10-26 PROCEDURE — 2060000000 HC ICU INTERMEDIATE R&B

## 2021-10-26 PROCEDURE — 94640 AIRWAY INHALATION TREATMENT: CPT

## 2021-10-26 PROCEDURE — 6360000002 HC RX W HCPCS: Performed by: NURSE PRACTITIONER

## 2021-10-26 PROCEDURE — 93970 EXTREMITY STUDY: CPT

## 2021-10-26 PROCEDURE — 2700000000 HC OXYGEN THERAPY PER DAY

## 2021-10-26 PROCEDURE — 6360000002 HC RX W HCPCS: Performed by: STUDENT IN AN ORGANIZED HEALTH CARE EDUCATION/TRAINING PROGRAM

## 2021-10-26 RX ORDER — PREDNISONE 20 MG/1
40 TABLET ORAL DAILY
Status: DISCONTINUED | OUTPATIENT
Start: 2021-10-27 | End: 2021-10-28 | Stop reason: HOSPADM

## 2021-10-26 RX ADMIN — SERTRALINE HYDROCHLORIDE 50 MG: 50 TABLET ORAL at 07:51

## 2021-10-26 RX ADMIN — Medication 10 ML: at 20:19

## 2021-10-26 RX ADMIN — BUDESONIDE 500 MCG: 0.5 SUSPENSION RESPIRATORY (INHALATION) at 21:35

## 2021-10-26 RX ADMIN — PANTOPRAZOLE SODIUM 40 MG: 40 TABLET, DELAYED RELEASE ORAL at 05:45

## 2021-10-26 RX ADMIN — ENOXAPARIN SODIUM 40 MG: 40 INJECTION SUBCUTANEOUS at 07:52

## 2021-10-26 RX ADMIN — LISINOPRIL 20 MG: 20 TABLET ORAL at 11:06

## 2021-10-26 RX ADMIN — AMLODIPINE BESYLATE 10 MG: 10 TABLET ORAL at 11:05

## 2021-10-26 RX ADMIN — IPRATROPIUM BROMIDE AND ALBUTEROL SULFATE 1 AMPULE: .5; 2.5 SOLUTION RESPIRATORY (INHALATION) at 12:37

## 2021-10-26 RX ADMIN — ATORVASTATIN CALCIUM 20 MG: 40 TABLET, FILM COATED ORAL at 07:51

## 2021-10-26 RX ADMIN — IPRATROPIUM BROMIDE AND ALBUTEROL SULFATE 1 AMPULE: .5; 2.5 SOLUTION RESPIRATORY (INHALATION) at 09:08

## 2021-10-26 RX ADMIN — Medication 10 ML: at 07:52

## 2021-10-26 RX ADMIN — INSULIN LISPRO 1 UNITS: 100 INJECTION, SOLUTION INTRAVENOUS; SUBCUTANEOUS at 11:06

## 2021-10-26 RX ADMIN — ARFORMOTEROL TARTRATE 15 MCG: 15 SOLUTION RESPIRATORY (INHALATION) at 21:35

## 2021-10-26 RX ADMIN — INSULIN LISPRO 1 UNITS: 100 INJECTION, SOLUTION INTRAVENOUS; SUBCUTANEOUS at 16:05

## 2021-10-26 RX ADMIN — IPRATROPIUM BROMIDE AND ALBUTEROL SULFATE 1 AMPULE: .5; 2.5 SOLUTION RESPIRATORY (INHALATION) at 21:35

## 2021-10-26 RX ADMIN — IPRATROPIUM BROMIDE AND ALBUTEROL SULFATE 1 AMPULE: .5; 2.5 SOLUTION RESPIRATORY (INHALATION) at 17:24

## 2021-10-26 RX ADMIN — ARFORMOTEROL TARTRATE 15 MCG: 15 SOLUTION RESPIRATORY (INHALATION) at 09:08

## 2021-10-26 RX ADMIN — INSULIN LISPRO 1 UNITS: 100 INJECTION, SOLUTION INTRAVENOUS; SUBCUTANEOUS at 20:17

## 2021-10-26 RX ADMIN — METHYLPREDNISOLONE SODIUM SUCCINATE 40 MG: 40 INJECTION, POWDER, LYOPHILIZED, FOR SOLUTION INTRAMUSCULAR; INTRAVENOUS at 07:51

## 2021-10-26 RX ADMIN — BUDESONIDE 500 MCG: 0.5 SUSPENSION RESPIRATORY (INHALATION) at 09:08

## 2021-10-26 ASSESSMENT — PAIN SCALES - GENERAL
PAINLEVEL_OUTOF10: 0

## 2021-10-26 NOTE — CARE COORDINATION
Social Work/Discharge Planning:  Social Work consult noted. COVID negative 10/24. Met with patient and completed initial assessment. Explained Social Work role and discussed transition of care/discharge planning. Patient lives with her  in a one story house. PTA she is independent with no adaptive device. She has a cane, walker, wheelchair, electric wheelchair and wears two liters of oxygen through [facility].  Patient denies any history with hhc or snf. She is currently requiring three liters of oxygen and AVAPS. Discussed order indicating need for an NIV at discharge. She states she prefers to use [facility].  She plans to return home at discharge and denies any need for hhc. Informed patient to contact her PCP, if hhc is needed. Called Vira with [facility] (ph: [phone]) and confirmed patient oxygen order is for two liters continuously. Colton Kinsey confirmed they do not supply NIV. Notified patient and discussed DME options. Patient does NOT want to use [facility]. She states she will use [facility] first and second choice is [facility].  Referral made to [facility] [place] with [employer] and he will review aptient information. Patient will need a detailed NIV order. Notified charge nurse. Will continue to follow.  Electronically signed by LES Cuevas on 10/26/2021 at 2:34 PM

## 2021-10-26 NOTE — PROGRESS NOTES
Date: 10/26/2021    Time: 3:42 PM    Patient Placed On BIPAP/CPAP/ Non-Invasive Ventilation? Yes    If no must comment. Facial area red/color change? No           If YES are Blister/Lesion present? No   If yes must notify nursing staff  BIPAP/CPAP skin barrier?   Yes    Skin barrier type:mepilexlite       Comments:        Usman Calderon RCP

## 2021-10-26 NOTE — PROGRESS NOTES
Pulmonary Progress Note    Admit Date: 10/23/2021  Requesting Physician: Cami Foreman DO      Subjective:  Awake and alert  Resting in bed  Feels better and dyspnea is at baseline with occ dry cough  On 3L - decreased to 2L which is baseline for home  Wearing AVAPS on 4 hrs/off 4 hrs  Complains of pain bilateral calves when squeezed - new for her  ABG's reviewed      Medications:     sodium chloride      dextrose        Arformoterol Tartrate  15 mcg Nebulization BID    budesonide  0.5 mg Nebulization BID    methylPREDNISolone  40 mg IntraVENous Daily    sodium chloride flush  10 mL IntraVENous 2 times per day    enoxaparin  40 mg SubCUTAneous Daily    amLODIPine  10 mg Oral Daily    lisinopril  20 mg Oral Daily    pantoprazole  40 mg Oral Daily with breakfast    atorvastatin  20 mg Oral Daily    insulin lispro  0-6 Units SubCUTAneous TID WC    insulin lispro  0-3 Units SubCUTAneous Nightly    ipratropium-albuterol  1 ampule Inhalation Q4H WA    sertraline  50 mg Oral Daily         Vitals:    VITALS:  BP (!) 102/59   Pulse 86   Temp 98.1 °F (36.7 °C) (Oral)   Resp 20   Ht 5' 3\" (1.6 m)   Wt 219 lb 8 oz (99.6 kg)   SpO2 96%   BMI 38.88 kg/m²   24HR INTAKE/OUTPUT:      Intake/Output Summary (Last 24 hours) at 10/26/2021 1128  Last data filed at 10/26/2021 0902  Gross per 24 hour   Intake 360 ml   Output 1050 ml   Net -690 ml     CURRENT PULSE OXIMETRY:  SpO2: 96 %  24HR PULSE OXIMETRY RANGE:  SpO2  Av.7 %  Min: 91 %  Max: 97 %      EXAM:  General: No distress. Eyes: PERRL. No sclera icterus. No conjunctival injection. ENT: No discharge. Pharynx clear. Dry mucous membranes  Neck: Trachea midline. Normal thyroid. Resp: No accessory muscle use. Diminished throughout with faint   Crackles L base. No wheezing/rhonchi. CV: Regular rate. Regular rhythm. No mumur or rub. ABD: Obese, non-tender. Non-distended. No masses. No organmegaly. Normal bowel sounds. Skin: Warm and dry.  No nodule on exposed extremities. No rash on exposed extremities. Lymph: No cervical LAD. No supraclavicular LAD. Ext: No joint deformity. No clubbing. No cyanosis. BLE with 1+ edema  Neuro: Awake. Follows commands      Lab Results:  CBC:   Recent Labs     10/23/21  2259 10/25/21  0619 10/26/21  0540   WBC 9.5 6.5 6.5   HGB 11.3* 12.2 11.2*   HCT 38.9 40.6 37.9   .7* 103.3* 103.6*    160 150     BMP:   Recent Labs     10/25/21  0619 10/25/21  1248 10/26/21  0540    136 138   K 4.1 4.3 4.0   CL 91* 92* 95*   CO2 39* 39* 37*   BUN 18 21 29*   CREATININE 0.7 0.8 0.9     LFT:   Recent Labs     10/23/21  2259 10/25/21  0619 10/26/21  0540   ALKPHOS 81 80 68   ALT 26 27 24   AST 35* 27 21   PROT 7.6 7.1 6.4   BILITOT 0.4 0.4 0.3   LABALBU 3.4* 3.8 3.5     PT/INR: No results for input(s): PROTIME, INR in the last 72 hours. Cultures:  No results for input(s): CULTRESP in the last 72 hours. Results for Mariah Somers (MRN [id number]) as of 10/24/2021    Ref.  Range 10/24/2021 12:37   Influenza A by PCR Latest Ref Range: Not Detected  Not Detected   Influenza B by PCR Latest Ref Range: Not Detected  Not Detected   Adenovirus by PCR Latest Ref Range: Not Detected  Not Detected   Coronavirus 229E by PCR Latest Ref Range: Not Detected  Not Detected   Coronavirus HKU1 by PCR Latest Ref Range: Not Detected  Not Detected   Coronavirus NL63 by PCR Latest Ref Range: Not Detected  Not Detected   Coronavirus OC43 by PCR Latest Ref Range: Not Detected  Not Detected   Human Metapneumovirus by PCR Latest Ref Range: Not Detected  Not Detected   Human Rhinovirus/Enterovirus by PCR Latest Ref Range: Not Detected  Not Detected   Parainfluenza Virus 1 by PCR Latest Ref Range: Not Detected  Not Detected   Parainfluenza Virus 2 by PCR Latest Ref Range: Not Detected  Not Detected   Parainfluenza Virus 3 by PCR Latest Ref Range: Not Detected  Not Detected   Parainfluenza Virus 4 by PCR Latest Ref Range: Not Detected  Not Detected   Respiratory Syncytial Virus by PCR Latest Ref Range: Not Detected  Not Detected   Bordetella parapertussis by PCR Latest Ref Range: Not Detected  Not Detected   Chlamydophilia pneumoniae by PCR Latest Ref Range: Not Detected  Not Detected   Mycoplasma pneumoniae by PCR Latest Ref Range: Not Detected  Not Detected   SARS-CoV-2, PCR Latest Ref Range: Not Detected  Not Detected   Bordetella pertussis by PCR Latest Ref Range: Not Detected  Not Detected       ABG:   Recent Labs     10/26/21  0832   PH 7.289*   PO2 83.2   PCO2 88.8*   HCO3 41.6*   BE 11.7*   O2SAT 94.8       Films:  XR CHEST 10/23/2021: The lungs are without acute focal process. There is no effusion or pneumothorax. The cardiomediastinal silhouette is without acute process. The osseous structures are without acute process. No acute process. CT HEAD WO CONTRAST 10/24/2021: BRAIN/VENTRICLES: There is no acute intracranial hemorrhage, mass effect or midline shift. No abnormal extra-axial fluid collection. The gray-white differentiation is maintained without evidence of an acute infarct. There is no evidence of hydrocephalus. ORBITS: The visualized portion of the orbits demonstrate no acute abnormality. SINUSES: The visualized paranasal sinuses demonstrate no acute abnormality. SOFT TISSUES/SKULL:  No acute abnormality of the visualized skull or soft tissues. No acute intracranial abnormality.          Assessment:  74 y.o. unvaccinated female presented to the ED with increased dyspnea, cough and wheezing over the past 3 days with delirium and   10/23 ABG remarkable for respiratory acidosis with metabolic alkalosis pH: 1.361 PCO2: 105.6  HCO3:52.2  10/24 COVID negative   CT head negative  Chest x-ray negative  Urine drug screen negative  10/25 AVAPS; 4L nc  10/26 AVAPS on/off q4; 2L nc    · Acute on chronic hypoxemic/hypercapnic respiratory failure  · Metabolic alkalosis severe hypochloremia  · Hyponatremia  · COPD exacerbation  · Probable obesity hypoventilation syndrome  ·  unvaccinated  · HTN  · HLD  · BMI 40.7  · Previous 3/9/2020 ED visit - abd pain; CT abd - cyst of pancreas (new)     Plan:  · Continue AVAPS for now on 4hrs/off 4 hrs. ABG reviewed - still with increase pCO of 88, question if this is her baseline. Discussed with resp/nursing. Wean to keep pox>92%. Baseline is 2L at home. With chronic respiratory failure secondary to severe COPD. The patient will benefit of the use of noninvasive ventilator at home to prevent exacerbations and readmissions. · Change solumedrol to prednisone 40mg to QD  · Continue duoneb - on Albuterol and Spiriva at home   · Respiratory panel negative  · IS  · Is considering COVID vaccine    MORENO Lynne    Electronically signed by LINWOOD Moulton CNP on 10/26/2021 at 11:28 AM    Seen and evaluated, agree with above assessment and plan. Venous Doppler negative for DVTs. Wean FiO2 to keep saturation between 90-95%, need to be restrictive due to her CO2 retention. We will increase her tidal volume to 500, with a hope to improve her minute ventilation.    ABGs in a.m. and adjust accordingly

## 2021-10-26 NOTE — PROGRESS NOTES
Occupational Therapy  OCCUPATIONAL THERAPY INITIAL EVALUATION     [address] [place], [place]       JUFQ:  Patient Name: Mayra Bradford  MRN: [id number]  : 1955  Room: [facility]2590-X    Evaluating OT: Celina Serna OTR/L [id number]    Referring Provider: Graham Hughes MD  Specific Provider Orders/Date: eval and treat 10/25/21    Diagnosis: acute delirium. Pt presents to ED from home with SOB, fatigue and weakness.       Pertinent Medical History: COPD, HTN     Precautions:  fall risk, O2    Assessment of current deficits   [x] Functional mobility  [x]ADLs  [x] Strength               [x]Cognition   [x] Functional transfers   [x] IADLs         [x] Safety Awareness   [x]Endurance   [] Fine Coordination              [x] Balance      [] Vision/perception   []Sensation    []Gross Motor Coordination  [] ROM  [] Delirium                   [] Motor Control     OT PLAN OF CARE   OT POC based on physician orders, patient diagnosis and results of clinical assessment    Frequency/Duration 2-5 days/wk for 10-14 days PRN   Specific OT Treatment Interventions to include:   * Instruction/training on adapted ADL techniques and AE recommendations to increase functional independence within precautions       * Training on energy conservation strategies, correct breathing pattern and techniques to improve independence/tolerance for self-care routine  * Functional transfer/mobility training/DME recommendations for increased independence, safety, and fall prevention  * Patient/Family education to increase follow through with safety techniques and functional independence  * Recommendation of environmental modifications for increased safety with functional transfers/mobility and ADLs  * Cognitive retraining/development of therapeutic activities to improve problem solving, judgement, memory, and attention for increased safety/participation in ADL/IADL tasks  * Therapeutic exercise to improve motor endurance, ROM, and functional strength for ADLs/functional transfers  * Therapeutic activities to facilitate/challenge dynamic balance, stand tolerance for increased safety and independence with ADLs  * Therapeutic activities to facilitate gross/fine motor skills for increased independence with ADLs    Recommended Adaptive Equipment: TBD     Home Living: Pt lives with  in a single story home. Bathroom setup: tub/shower, standard commode     Prior Level of Function: Independent with ADLs,  assists with IADLs; completed functional mobility with with no AD. Has cane, WW, WC and electric WC if needed. Wears 2L O2 at home. Driving: No.    Pain Level: no reported pain    Cognition: A&O: /. Pt is alert and oriented. Pleasant and cooperative. Problem solving:  fair   Judgement/safety:  fair     Functional Assessment: AM-PAC Daily Activity Raw Score:    Initial Eval Status  Date: 10/26/21 Treatment session:  STGs=LTGS  Timeframe 10-14 days     Feeding Independent     Grooming SBA  Standing sink level for hand hygiene  Independent   UB Dressing Set up  Independent   LB Dressing SBA   Management of B socks  Mod I    Bathing Min A  Mod I   Toileting SBA  Use of grab bar for support in transfer  Able to manage cony care and brief  Mod I   Bed Mobility  Supine <> sit: Independent     Functional Transfers STS: SBA  Mod I   Functional Mobility CGA with no AD  Household distance  Assist to manage O2 cord  Mild unsteadiness but no LOB  Mod I during ADLs   Balance Sitting: Good    Standing: Fair     Activity Tolerance Fair minus  standing dejuan x7-8 min with good minus balance during self care tasks           Comments: 2L O2 restin%, with mobility into bathroom: 80% increased to 4L min recovery to 95%. Mobility out of bathroom on 3L O2: 83% increased to 4L min recovery to 95%.  Able to maintain 95% on 2L post recovery at rest.    Treatment: Patient educated on techniques for completion of ADL, safe functional transfers and functional mobility. Patient required cues for follow through with proper hand/foot placement, pacing, safety, compensatory strategies, breathing and technique in bed mobility, functional transfers, functional mobility, toileting, grooming and LB dressing in preparation for maximum independence in all self care tasks. Hand Dominance: Right   Strength ROM Additional Info:    RUE  4-/5 WFL good  and FMC/dexterity noted during ADL tasks     LUE 4-/5 WFL good  and FMC/dexterity noted during ADL tasks         Hearing: WFL   Vision: WFL   Sensation:  No c/o numbness or tingling   Tone: WFL   Edema: none                             Rehab Potential: Good for established goals     Patient/Family Goal: To get home. Patient and/or family were instructed on functional diagnosis, prognosis/goals and OT plan of care. Pt and  verbalized understanding. Upon arrival, patient supine in bed. At end of session, patient supine in bed with call light and phone within reach, all lines and tubes intact. Pt would benefit from continued skilled OT to increase safety and independence with completion of ADL/IADL tasks for functional independence and quality of life.  Bed/chair alarm: ON    Low Evaluation 26740  Time In: 1245   Time Out: 1303  Total: 18 min      Evaluation time includes thorough review of current medical information, gathering information on past medical history/social history and prior level of function, completion of standardized testing/informal observation of tasks, assessment of data, and development of POC/Goals    Geoffrey Flynn OTR/L  [id number]

## 2021-10-26 NOTE — PROGRESS NOTES
Internal Medicine Progress Note    Patient's name: Saroj Katz  : 1955  Chief complaints (on day of admission): Shortness of Breath (Pt stated a month and is getting worse), Fatigue, and Hallucinations  Admission date: 10/23/2021  Date of service: 10/26/2021   Room: [place] [address]  Primary care physician: Delio Sidhu DO  Reason for visit: Follow-up for COPD exacerbation    Subjective  Natasha Logan was seen and examined at bedside     10-26 she was seen eating breakfast this morning. She complains of some coughing. She denies any other new complaints at this time. 10-25 She states she feels 100% better from yesterday but that with exertion she still becomes quite light headed and hypoxic     Current treatment plan discussed and all questions answered. Current medications being prescribed discussed and patient expresses verbal understanding       Review of Systems  There are no new complaints of chest pain, shortness of breath, abdominal pain, nausea, vomiting, diarrhea, constipation unless otherwise mentioned above.      Hospital Medications  Current Facility-Administered Medications   Medication Dose Route Frequency Provider Last Rate Last Admin    Arformoterol Tartrate (BROVANA) nebulizer solution 15 mcg  15 mcg Nebulization BID Kristy Cervantes MD   15 mcg at 10/25/21 2114    budesonide (PULMICORT) nebulizer suspension 500 mcg  0.5 mg Nebulization BID Kristy Cervantes MD   500 mcg at 10/25/21 2114    methylPREDNISolone sodium (SOLU-MEDROL) injection 40 mg  40 mg IntraVENous Daily Yesenia Frank, APRN - CNP        sodium chloride flush 0.9 % injection 10 mL  10 mL IntraVENous 2 times per day Juliana Bishop, DO   10 mL at 10/24/21 0811    sodium chloride flush 0.9 % injection 10 mL  10 mL IntraVENous PRN Juliana SANCHEZ Edna, DO        0.9 % sodium chloride infusion  25 mL IntraVENous PRN Juliana E Edna, DO        potassium chloride (KLOR-CON M) extended release tablet 40 mEq  40 mEq Oral PRN Beverly Nati Edna, DO        Or    potassium bicarb-citric acid (EFFER-K) effervescent tablet 40 mEq  40 mEq Oral PRN Juliana SANCHEZ Edna, DO        Or    potassium chloride 10 mEq/100 mL IVPB (Peripheral Line)  10 mEq IntraVENous PRN Juliana E Edna, DO        enoxaparin (LOVENOX) injection 40 mg  40 mg SubCUTAneous Daily Juliana SANCHEZ Edna, DO   40 mg at 10/25/21 0820    senna (SENOKOT) tablet 8.6 mg  1 tablet Oral Daily PRN Juliana SANCHEZ Edna, DO        acetaminophen (TYLENOL) tablet 650 mg  650 mg Oral Q6H PRN Juliana SANCHEZ Edna, DO        Or    acetaminophen (TYLENOL) suppository 650 mg  650 mg Rectal Q6H PRN Juliana SANCHEZ Edna, DO        amLODIPine (NORVASC) tablet 10 mg  10 mg Oral Daily Juliana SANCHEZ Edna, DO   10 mg at 10/25/21 0819    lisinopril (PRINIVIL;ZESTRIL) tablet 20 mg  20 mg Oral Daily Juliana SANCHEZ Edna, DO   20 mg at 10/25/21 0819    pantoprazole (PROTONIX) tablet 40 mg  40 mg Oral Daily with breakfast Juliana SANCHEZ Edna, DO   40 mg at 10/26/21 0545    atorvastatin (LIPITOR) tablet 20 mg  20 mg Oral Daily Juliana SANCHEZ Edna, DO   20 mg at 10/25/21 0819    insulin lispro (HUMALOG) injection vial 0-6 Units  0-6 Units SubCUTAneous TID WC Juliana SANCHEZ Edna, DO   3 Units at 10/25/21 1201    insulin lispro (HUMALOG) injection vial 0-3 Units  0-3 Units SubCUTAneous Nightly Juliana SANCHEZ Edna, DO   1 Units at 10/24/21 2051    glucose (GLUTOSE) 40 % oral gel 15 g  15 g Oral PRN Juliana SANCHEZ Edna, DO        dextrose 50 % IV solution  12.5 g IntraVENous PRN Juliana SANCHEZ Edna, DO        glucagon (rDNA) injection 1 mg  1 mg IntraMUSCular PRN Juliana E Edna, DO        dextrose 5 % solution  100 mL/hr IntraVENous PRN Juliana SANCHEZ Edna, DO        albuterol (PROVENTIL) nebulizer solution 2.5 mg  2.5 mg Nebulization Q4H PRN Juliana E Edna, DO   2.5 mg at 10/24/21 0815    ipratropium-albuterol (DUONEB) nebulizer solution 1 ampule  1 ampule Inhalation Q4H LINWOOD Dumont - CNP   1 ampule at 10/25/21 9298  sertraline (ZOLOFT) tablet 50 mg  50 mg Oral Daily Juliana Bishop, DO   50 mg at 10/25/21 0819       PRN Medications  sodium chloride flush, sodium chloride, potassium chloride **OR** potassium alternative oral replacement **OR** potassium chloride, senna, acetaminophen **OR** acetaminophen, glucose, dextrose, glucagon (rDNA), dextrose, albuterol    Objective  Most Recent Recorded Vitals  BP (!) 92/53   Pulse 76   Temp 98.2 °F (36.8 °C) (Axillary)   Resp 24   Ht 5' 3\" (1.6 m)   Wt 219 lb 8 oz (99.6 kg)   SpO2 91%   BMI 38.88 kg/m²   I/O last 3 completed shifts: In: 360 [P.O.:360]  Out: 900 [Urine:900]  No intake/output data recorded. Physical Exam:  General: AAO to person/place/time/purpose, NAD, no labored breathing  Eyes: conjunctivae/corneas clear, sclera non icteric  Skin: color/texture/turgor normal, no rashes or lesions  Lungs: CTAB, no retractions/use of accessory muscles, no vocal fremitus, no rhonchi, no crackle, no rales  Heart: regular rate, regular rhythm, no murmur  Abdomen: soft, NT, bowel sounds normal  Extremities: atraumatic, no edema  Neurologic: cranial nerves 2-12 grossly intact, no slurred speech    Most Recent Labs  Lab Results   Component Value Date    WBC 6.5 10/26/2021    HGB 11.2 (L) 10/26/2021    HCT 37.9 10/26/2021     10/26/2021     10/26/2021    K 4.0 10/26/2021    CL 95 (L) 10/26/2021    CREATININE 0.9 10/26/2021    BUN 29 (H) 10/26/2021    CO2 37 (H) 10/26/2021    GLUCOSE 106 (H) 10/26/2021    ALT 24 10/26/2021    AST 21 10/26/2021    TSH 5.630 (H) 03/17/2020    LABA1C 6.0 (H) 12/17/2019       CT HEAD WO CONTRAST   Final Result   No acute intracranial abnormality. XR CHEST (2 VW)   Final Result   No acute process.                Assessment   Active Hospital Problems    Diagnosis     COPD exacerbation ([address] [J44.1]     Essential hypertension [I10]          Plan    · Acute Exacerbation COPD/ Acute on chronic hypoxic and hypercapnic Respiratory failure  · Baseline 2 L O2 nc at home   · Solumedrol 40 BID currently   · Duoneb   · Bipap q4 on off - qhs -- will need bipap at home   · Pulm is on board   · Acute Delirium  · Continue to monitor - resolved   · Hyponatremia/hypochloremia  · Correcting on its own now, encourage PO intake   · Continue home medications other than HCTZ  · PT/OT   · Follow labs  · DVT prophylaxis Lovenox   ·  for discharge planning  · Discharge plan: 1-2 days, needs home NIV set up     Patient was seen and evaluated by myself and my attending Karly Amaya MD. Assessment and Plan discussed with attending provider, please see attestation for final plan of care. Velia Teixeira,    10/26/2021  9:30 AM    Addendum: I have personally participated in a face-to-face history and physical exam on the date of service with the patient. I have discussed the case with the resident. I also participated in medical decision making with the resident on the date of service and I agree with all of the pertinent clinical information unless indicated in my editing of the note. I have reviewed and edited the note above based on my findings during my history, exam, and decision making on the same day of service. My additional thoughts:    Agree with above     Electronically signed by Karly Amaya MD on 10/26/2021 at 1:10 PM    I can be reached through Between.

## 2021-10-26 NOTE — PROGRESS NOTES
Physician Progress Note      Mauricio Moreno  CSN #:                  [id number]  :                       1955  ADMIT DATE:       10/23/2021 10:11 PM  [address] DATE:  RESPONDING  PROVIDER #:        Sugar Berry          QUERY TEXT:    Pt admitted with COPD exacerbation. Pt noted to have   confusion/delirium/hallucinations. If possible, please document in the   progress notes and discharge summary if you are evaluating and / or treating   any of the following: The medical record reflects the following:  Risk Factors: hypercapnea, hyponatremia, Zoloft use  Clinical Indicators: On admission Na 130, CO2 46, pCO2 on ABG's 105.6  ED \". ..likelyhood her confusion and hallucination episodes may be due to her   hypercapnia. \"  H&P \"  Acute Delirium: likely retained CO2. Pt does report that she is not   sure if she has been taking her zoloft or taking too much of it. \"  Treatment: BIPAP, solumedrol, duonebs, home oxygen 2L    Thank you,  Tracie Cage RN, CCDS  Clinical Documentation Improvement Specialist  [email or web address]  [phone]  Options provided:  -- Metabolic encephalopathy  -- Toxic encephalopathy  -- Drug-induced encephalopathy due to Zoloft  -- Other - I will add my own diagnosis  -- Disagree - Not applicable / Not valid  -- Disagree - Clinically unable to determine / Unknown  -- Refer to Clinical Documentation Reviewer    PROVIDER RESPONSE TEXT:    Hypercapnic encephalopathy    Query created byTeodora Roy on 10/26/2021 10:41 AM      Electronically signed by:  Sugar Berry 10/26/2021 1:09 PM

## 2021-10-27 LAB
ALBUMIN SERPL-MCNC: 3.7 G/DL (ref 3.5–5.2)
ALP BLD-CCNC: 68 U/L (ref 35–104)
ALT SERPL-CCNC: 25 U/L (ref 0–32)
ANION GAP SERPL CALCULATED.3IONS-SCNC: 6 MMOL/L (ref 7–16)
AST SERPL-CCNC: 19 U/L (ref 0–31)
B.E.: 8.8 MMOL/L (ref -3–3)
BASOPHILS ABSOLUTE: 0 E9/L (ref 0–0.2)
BASOPHILS RELATIVE PERCENT: 0 % (ref 0–2)
BILIRUB SERPL-MCNC: 0.4 MG/DL (ref 0–1.2)
BUN BLDV-MCNC: 33 MG/DL (ref 6–23)
CALCIUM SERPL-MCNC: 9.2 MG/DL (ref 8.6–10.2)
CHLORIDE BLD-SCNC: 94 MMOL/L (ref 98–107)
CO2: 40 MMOL/L (ref 22–29)
COHB: 0.8 % (ref 0–1.5)
CREAT SERPL-MCNC: 1.1 MG/DL (ref 0.5–1)
CRITICAL: ABNORMAL
DATE ANALYZED: ABNORMAL
DATE OF COLLECTION: ABNORMAL
EOSINOPHILS ABSOLUTE: 0.01 E9/L (ref 0.05–0.5)
EOSINOPHILS RELATIVE PERCENT: 0.1 % (ref 0–6)
GFR AFRICAN AMERICAN: 60
GFR NON-AFRICAN AMERICAN: 49 ML/MIN/1.73
GLUCOSE BLD-MCNC: 104 MG/DL (ref 74–99)
HCO3: 37.2 MMOL/L (ref 22–26)
HCT VFR BLD CALC: 38.9 % (ref 34–48)
HEMOGLOBIN: 11.3 G/DL (ref 11.5–15.5)
HHB: 5.1 % (ref 0–5)
IMMATURE GRANULOCYTES #: 0.02 E9/L
IMMATURE GRANULOCYTES %: 0.3 % (ref 0–5)
LAB: ABNORMAL
LYMPHOCYTES ABSOLUTE: 1.28 E9/L (ref 1.5–4)
LYMPHOCYTES RELATIVE PERCENT: 19 % (ref 20–42)
Lab: ABNORMAL
MCH RBC QN AUTO: 30.3 PG (ref 26–35)
MCHC RBC AUTO-ENTMCNC: 29 % (ref 32–34.5)
MCV RBC AUTO: 104.3 FL (ref 80–99.9)
METER GLUCOSE: 102 MG/DL (ref 74–99)
METER GLUCOSE: 113 MG/DL (ref 74–99)
METER GLUCOSE: 136 MG/DL (ref 74–99)
METER GLUCOSE: 161 MG/DL (ref 74–99)
METHB: 0.1 % (ref 0–1.5)
MODE: ABNORMAL
MONOCYTES ABSOLUTE: 0.55 E9/L (ref 0.1–0.95)
MONOCYTES RELATIVE PERCENT: 8.1 % (ref 2–12)
NEUTROPHILS ABSOLUTE: 4.89 E9/L (ref 1.8–7.3)
NEUTROPHILS RELATIVE PERCENT: 72.5 % (ref 43–80)
O2 CONTENT: 15.7 ML/DL
O2 SATURATION: 94.9 % (ref 92–98.5)
O2HB: 94 % (ref 94–97)
OPERATOR ID: 421
PATIENT TEMP: 37 C
PCO2: 72.8 MMHG (ref 35–45)
PDW BLD-RTO: 13.8 FL (ref 11.5–15)
PH BLOOD GAS: 7.33 (ref 7.35–7.45)
PLATELET # BLD: 141 E9/L (ref 130–450)
PMV BLD AUTO: 11.1 FL (ref 7–12)
PO2: 77.3 MMHG (ref 75–100)
POTASSIUM REFLEX MAGNESIUM: 4.1 MMOL/L (ref 3.5–5)
RBC # BLD: 3.73 E12/L (ref 3.5–5.5)
SODIUM BLD-SCNC: 140 MMOL/L (ref 132–146)
SOURCE, BLOOD GAS: ABNORMAL
THB: 11.8 G/DL (ref 11.5–16.5)
TIME ANALYZED: 1203
TOTAL PROTEIN: 6.7 G/DL (ref 6.4–8.3)
WBC # BLD: 6.8 E9/L (ref 4.5–11.5)

## 2021-10-27 PROCEDURE — 80053 COMPREHEN METABOLIC PANEL: CPT

## 2021-10-27 PROCEDURE — 6360000002 HC RX W HCPCS: Performed by: INTERNAL MEDICINE

## 2021-10-27 PROCEDURE — 6360000002 HC RX W HCPCS: Performed by: STUDENT IN AN ORGANIZED HEALTH CARE EDUCATION/TRAINING PROGRAM

## 2021-10-27 PROCEDURE — 97161 PT EVAL LOW COMPLEX 20 MIN: CPT

## 2021-10-27 PROCEDURE — 2700000000 HC OXYGEN THERAPY PER DAY

## 2021-10-27 PROCEDURE — 2580000003 HC RX 258: Performed by: INTERNAL MEDICINE

## 2021-10-27 PROCEDURE — 82805 BLOOD GASES W/O2 SATURATION: CPT

## 2021-10-27 PROCEDURE — 85025 COMPLETE CBC W/AUTO DIFF WBC: CPT

## 2021-10-27 PROCEDURE — 36415 COLL VENOUS BLD VENIPUNCTURE: CPT

## 2021-10-27 PROCEDURE — 6370000000 HC RX 637 (ALT 250 FOR IP): Performed by: NURSE PRACTITIONER

## 2021-10-27 PROCEDURE — 6370000000 HC RX 637 (ALT 250 FOR IP): Performed by: INTERNAL MEDICINE

## 2021-10-27 PROCEDURE — 94640 AIRWAY INHALATION TREATMENT: CPT

## 2021-10-27 PROCEDURE — 82962 GLUCOSE BLOOD TEST: CPT

## 2021-10-27 PROCEDURE — 94660 CPAP INITIATION&MGMT: CPT

## 2021-10-27 PROCEDURE — 2060000000 HC ICU INTERMEDIATE R&B

## 2021-10-27 RX ADMIN — ENOXAPARIN SODIUM 40 MG: 40 INJECTION SUBCUTANEOUS at 09:58

## 2021-10-27 RX ADMIN — ATORVASTATIN CALCIUM 20 MG: 40 TABLET, FILM COATED ORAL at 09:57

## 2021-10-27 RX ADMIN — ARFORMOTEROL TARTRATE 15 MCG: 15 SOLUTION RESPIRATORY (INHALATION) at 08:36

## 2021-10-27 RX ADMIN — LISINOPRIL 20 MG: 20 TABLET ORAL at 09:57

## 2021-10-27 RX ADMIN — IPRATROPIUM BROMIDE AND ALBUTEROL SULFATE 1 AMPULE: .5; 2.5 SOLUTION RESPIRATORY (INHALATION) at 20:24

## 2021-10-27 RX ADMIN — IPRATROPIUM BROMIDE AND ALBUTEROL SULFATE 1 AMPULE: .5; 2.5 SOLUTION RESPIRATORY (INHALATION) at 08:36

## 2021-10-27 RX ADMIN — Medication 10 ML: at 09:58

## 2021-10-27 RX ADMIN — BUDESONIDE 500 MCG: 0.5 SUSPENSION RESPIRATORY (INHALATION) at 20:24

## 2021-10-27 RX ADMIN — IPRATROPIUM BROMIDE AND ALBUTEROL SULFATE 1 AMPULE: .5; 2.5 SOLUTION RESPIRATORY (INHALATION) at 12:15

## 2021-10-27 RX ADMIN — PANTOPRAZOLE SODIUM 40 MG: 40 TABLET, DELAYED RELEASE ORAL at 06:43

## 2021-10-27 RX ADMIN — BUDESONIDE 500 MCG: 0.5 SUSPENSION RESPIRATORY (INHALATION) at 08:36

## 2021-10-27 RX ADMIN — PREDNISONE 40 MG: 20 TABLET ORAL at 09:57

## 2021-10-27 RX ADMIN — SERTRALINE HYDROCHLORIDE 50 MG: 50 TABLET ORAL at 09:57

## 2021-10-27 RX ADMIN — Medication 10 ML: at 21:28

## 2021-10-27 RX ADMIN — AMLODIPINE BESYLATE 10 MG: 10 TABLET ORAL at 09:57

## 2021-10-27 RX ADMIN — INSULIN LISPRO 1 UNITS: 100 INJECTION, SOLUTION INTRAVENOUS; SUBCUTANEOUS at 16:10

## 2021-10-27 RX ADMIN — ARFORMOTEROL TARTRATE 15 MCG: 15 SOLUTION RESPIRATORY (INHALATION) at 20:24

## 2021-10-27 RX ADMIN — IPRATROPIUM BROMIDE AND ALBUTEROL SULFATE 1 AMPULE: .5; 2.5 SOLUTION RESPIRATORY (INHALATION) at 16:56

## 2021-10-27 ASSESSMENT — PAIN SCALES - GENERAL
PAINLEVEL_OUTOF10: 0

## 2021-10-27 NOTE — PROGRESS NOTES
Pulmonary Progress Note    Admit Date: 10/23/2021  Requesting Physician: Bryan Hampton DO      Subjective:  Sitting upright in bed, on AVAP, remains on 4 hrs/off 4 hrs. Noticed a difference this morning when off, became sleepy. ABG attempts unsuccesful this am, awaiting resp therapy. US neg for DVT yesterday. Denies cough. Medications:     sodium chloride      dextrose        predniSONE  40 mg Oral Daily    Arformoterol Tartrate  15 mcg Nebulization BID    budesonide  0.5 mg Nebulization BID    sodium chloride flush  10 mL IntraVENous 2 times per day    enoxaparin  40 mg SubCUTAneous Daily    amLODIPine  10 mg Oral Daily    lisinopril  20 mg Oral Daily    pantoprazole  40 mg Oral Daily with breakfast    atorvastatin  20 mg Oral Daily    insulin lispro  0-6 Units SubCUTAneous TID WC    insulin lispro  0-3 Units SubCUTAneous Nightly    ipratropium-albuterol  1 ampule Inhalation Q4H WA    sertraline  50 mg Oral Daily         Vitals:    VITALS:  /63   Pulse 77   Temp 98.8 °F (37.1 °C) (Axillary)   Resp 24   Ht 5' 3\" (1.6 m)   Wt 222 lb (100.7 kg)   SpO2 98%   BMI 39.33 kg/m²   24HR INTAKE/OUTPUT:      Intake/Output Summary (Last 24 hours) at 10/27/2021 0856  Last data filed at 10/27/2021 0831  Gross per 24 hour   Intake 540 ml   Output 1400 ml   Net -860 ml     CURRENT PULSE OXIMETRY:  SpO2: 98 %  24HR PULSE OXIMETRY RANGE:  SpO2  Av %  Min: 86 %  Max: 98 %      EXAM:  General: No distress. ENT: Dry mucous membranes  Neck: Trachea midline. Resp: No accessory muscle use. Diminished throughout. CV: Regular rate. Regular rhythm. No mumur or rub. ABD: Obese, non-tender. Non-distended. Normal bowel sounds. Skin: Warm and dry. No rash on exposed extremities. Ext: No joint deformity. No cyanosis. BLE with 1+ edema  Neuro: Awake.  Follows commands      Lab Results:  CBC:   Recent Labs     10/25/21  0619 10/26/21  0540 10/27/21  0314   WBC 6.5 6.5 6.8   HGB 12.2 11.2* 11.3*   HCT 40.6 37.9 38.9   .3* 103.6* 104.3*    150 141     BMP:   Recent Labs     10/25/21  1248 10/26/21  0540 10/27/21  0314    138 140   K 4.3 4.0 4.1   CL 92* 95* 94*   CO2 39* 37* 40*   BUN 21 29* 33*   CREATININE 0.8 0.9 1.1*     LFT:   Recent Labs     10/25/21  0619 10/26/21  0540 10/27/21  0314   ALKPHOS 80 68 68   ALT 27 24 25   AST 27 21 19   PROT 7.1 6.4 6.7   BILITOT 0.4 0.3 0.4   LABALBU 3.8 3.5 3.7     PT/INR: No results for input(s): PROTIME, INR in the last 72 hours. Cultures:  No results for input(s): CULTRESP in the last 72 hours. Results for Alize Alfaro (MRN [id number]) as of 10/24/2021    Ref.  Range 10/24/2021 12:37   Influenza A by PCR Latest Ref Range: Not Detected  Not Detected   Influenza B by PCR Latest Ref Range: Not Detected  Not Detected   Adenovirus by PCR Latest Ref Range: Not Detected  Not Detected   Coronavirus 229E by PCR Latest Ref Range: Not Detected  Not Detected   Coronavirus HKU1 by PCR Latest Ref Range: Not Detected  Not Detected   Coronavirus NL63 by PCR Latest Ref Range: Not Detected  Not Detected   Coronavirus OC43 by PCR Latest Ref Range: Not Detected  Not Detected   Human Metapneumovirus by PCR Latest Ref Range: Not Detected  Not Detected   Human Rhinovirus/Enterovirus by PCR Latest Ref Range: Not Detected  Not Detected   Parainfluenza Virus 1 by PCR Latest Ref Range: Not Detected  Not Detected   Parainfluenza Virus 2 by PCR Latest Ref Range: Not Detected  Not Detected   Parainfluenza Virus 3 by PCR Latest Ref Range: Not Detected  Not Detected   Parainfluenza Virus 4 by PCR Latest Ref Range: Not Detected  Not Detected   Respiratory Syncytial Virus by PCR Latest Ref Range: Not Detected  Not Detected   Bordetella parapertussis by PCR Latest Ref Range: Not Detected  Not Detected   Chlamydophilia pneumoniae by PCR Latest Ref Range: Not Detected  Not Detected   Mycoplasma pneumoniae by PCR Latest Ref Range: Not Detected  Not Detected SARS-CoV-2, PCR Latest Ref Range: Not Detected  Not Detected   Bordetella pertussis by PCR Latest Ref Range: Not Detected  Not Detected       ABG:   Recent Labs     10/26/21  0832   PH 7.289*   PO2 83.2   PCO2 88.8*   HCO3 41.6*   BE 11.7*   O2SAT 94.8       Films:  XR CHEST 10/23/2021: The lungs are without acute focal process. There is no effusion or pneumothorax. The cardiomediastinal silhouette is without acute process. The osseous structures are without acute process. No acute process. CT HEAD WO CONTRAST 10/24/2021: BRAIN/VENTRICLES: There is no acute intracranial hemorrhage, mass effect or midline shift. No abnormal extra-axial fluid collection. The gray-white differentiation is maintained without evidence of an acute infarct. There is no evidence of hydrocephalus. ORBITS: The visualized portion of the orbits demonstrate no acute abnormality. SINUSES: The visualized paranasal sinuses demonstrate no acute abnormality. SOFT TISSUES/SKULL:  No acute abnormality of the visualized skull or soft tissues. No acute intracranial abnormality. US neg for DVT 10/26/21    Assessment:  74 y.o. unvaccinated female presented to the ED with increased dyspnea, cough and wheezing over the past 3 days with delirium and   10/23 ABG remarkable for respiratory acidosis with metabolic alkalosis pH: 2.250 PCO2: 105.6  HCO3:52.2  10/24 COVID negative   CT head negative  Chest x-ray negative  Urine drug screen negative  10/25 AVAPS; 4L nc  10/26 AVAPS on/off q4; 2L nc - Vt adjusted to 500  10/27 same    · Acute on chronic hypoxemic/hypercapnic respiratory failure  · Metabolic alkalosis severe hypochloremia  · Hyponatremia  · COPD exacerbation  · Probable obesity hypoventilation syndrome  ·  unvaccinated  · HTN  · HLD  · BMI 40.7  · Previous 3/9/2020 ED visit - abd pain; CT abd - cyst of pancreas (new)     Plan:  · Continue AVAPS for now on 4hrs/off 4 hrs. Most recent abg with pH 7.28/pCO2 of 88.    · Baseline is 2L at home. With chronic respiratory failure secondary to severe COPD. The patient will benefit of the use of noninvasive ventilator at home to prevent exacerbations and readmissions. · Change solumedrol to prednisone 40mg to QD  · Continue duoneb - on Albuterol and Spiriva at home   · Respiratory panel negative  · IS  · Is considering COVID vaccine  ·   Await repeat ABG this am and will adjust avaps accordingly. Electronically signed by LINWOOD Villela CNP on 10/27/2021 at 8:56 AM    Seen and evaluated, and agree with above assessment and plan  Patient feels much better. Wean FiO2 to keep saturation between 90 and 95%  Arterial blood gas seen with improvement of pH and lower CO2. We are not going to change noninvasive ventilator to nightly and as needed.   Discussed with  for noninvasive ventilator at home

## 2021-10-27 NOTE — PROGRESS NOTES
This nurse and another nurse on the floor tried the patients ABG's twice.  respiratory called and said they would;d be up to try patient

## 2021-10-27 NOTE — PROGRESS NOTES
Date: 10/26/2021    Time: 9:40 PM    Patient Placed On BIPAP/CPAP/ Non-Invasive Ventilation? Yes    If no must comment. Facial area red/color change? No           If YES are Blister/Lesion present? No   If yes must notify nursing staff  BIPAP/CPAP skin barrier?   Yes    Skin barrier type:mepilexlite       Comments:        Opal Schwarz, P

## 2021-10-27 NOTE — CARE COORDINATION
Social Work/Discharge Planning:  Chart reviewed. Patient on 3 liters of oxygen. She wears two liters of oxygen at home. Flakito Ernandez with [employer] states patient NIV will need prior authorization and he will fax script for Physician to sign. Flakito Ernandez states NIV should be approved by tomorrow if script is signed & submitted before noon today. Will continue to follow. Electronically signed by LES Chun on 10/27/2021 at 10:35 AM    Addendum:  Physician signed NIV order and  faxed to [facility]. Notified Flakito Ernandez with [facility]. Will continue to follow and wait for NIV approval.  Electronically signed by LES Chun on 10/27/2021 at 11:35 AM    Addendum:  Met with patient and provided her with an update regarding NIV.   Electronically signed by LES Chun on 10/27/2021 at 12:00 PM

## 2021-10-27 NOTE — PROGRESS NOTES
Physical Therapy    Facility/Department: [address] INTERMEDIATE 1  Initial Assessment    NAME: Saroj Katz  : 1955  MRN: [id number]    Date of Service: 10/27/2021      Patient Diagnosis(es): The primary encounter diagnosis was Acute delirium. Diagnoses of COPD exacerbation ([facility] and Respiratory acidosis were also pertinent to this visit. has a past medical history of COPD (chronic obstructive pulmonary disease) ([facility] Hyperlipidemia, and Hypertension. has a past surgical history that includes  section. Referring provider:  Kristy Cervantes MD    PT Order:  PT eval and treat     Evaluating PT:  Mil Walker PT, DPT PT [id number]    Room #:  0485/6389-Y  Diagnosis:  Acute delirium [R41.0]  Respiratory acidosis [E87.2]  COPD exacerbation ([facility] [J44.1]  Precautions:  Fall risk, O2  Equipment Needs:  None. Pt reported owing a walker, w/c, and an electric w/c    SUBJECTIVE:    Pt lives with her  in a 1 story home with ramp to enter. Bed and bath is on first floor. Pt ambulated with no device in the home and uses a w/c in the community. OBJECTIVE:   Initial Evaluation  Date: 10/27 Treatment Short Term/ Long Term   Goals   Was pt agreeable to Eval/treatment? yes     Does pt have pain?  None reported     Bed Mobility  Rolling: independent  Supine to sit: independent  Sit to supine: independent  Scooting: independent to sitting EOB  independent   Transfers Sit to stand: supervision  Stand to sit: supervision  Stand pivot: NT  independent   Ambulation    30 feet with no device SBA   100+ feet with no device supervision/independent    Stair negotiation: ascended and descended  NT      ROM BLE:  WFL     Strength BLE:  Grossly 4/5  Grossly 4+/5   Balance Sitting EOB:  independent  Dynamic Standing:  SBA without device  Sitting EOB:  independent  Dynamic Standing:  Supervision/independent   AM-PAC 6 Clicks 76/24       Pt is A & O x 3  Sensation:  Pt denies numbness and tingling to extremities      Vitals:  SPO2 after ambulation drop to 89% but quickly increased to 92-94% on 4L. Patient education  Pt educated on PT objectives during eval and while in the hospital, deep breathing. Patient response to education:   Pt verbalized understanding Pt demonstrated skill Pt requires further education in this area   yes With cueing yes     ASSESSMENT:    Conditions Requiring Skilled Therapeutic Intervention:    [x]Decreased strength     []Decreased ROM  [x]Decreased functional mobility  [x]Decreased balance   [x]Decreased endurance   []Decreased posture  []Decreased sensation  []Decreased coordination   []Decreased vision  []Decreased safety awareness   []Increased pain       Comments:  Pt found in bed. No report of dizziness during functional mobility. Pt with mildly unsteady gait bit no LOB. Cueing required for deep breathing. At end of eval, pt left in bed with call light in reach. Pt's/ family goals   1. None stated    Prognosis is good for reaching above PT goals. Patient and or family understand(s) diagnosis, prognosis, and plan of care.   yes    PHYSICAL THERAPY PLAN OF CARE:    PT POC is established based on physician order and patient diagnosis     Diagnosis:  Acute delirium [R41.0]  Respiratory acidosis [E87.2]  COPD exacerbation ([facility] [J44.1]    Current Treatment Recommendations:     [x] Strengthening to improve independence with functional mobility   [] ROM to improve independence with functional mobility   [x] Balance Training to improve static/dynamic balance and to reduce fall risk  [x] Endurance Training to improve activity tolerance during functional mobility   [x] Transfer Training to improve safety and independence with all functional transfers   [x] Gait Training to improve gait mechanics, endurance and assess need for appropriate assistive device  [] Stair Training in preparation for safe discharge home and/or into the community   [] Positioning to prevent skin breakdown and contractures  [x] Safety and Education Training   [x] Patient/Caregiver Education   [] HEP  [] Other     PT long term treatment goals are located in above grid    Frequency of treatments: 2-5x/week x 1-2 weeks. Time in  0858  Time out  0906    Evaluation Time includes thorough review of current medical information, gathering information on past medical history/social history and prior level of function, completion of standardized testing/informal observation of tasks, assessment of data and education on plan of care and goals.     CPT codes:  [x] Low Complexity PT evaluation 50975  [] Moderate Complexity PT evaluation 52923  [] High Complexity PT evaluation 48918  [] PT Re-evaluation 19884  [] Therapeutic activities 69001 __minutes  [] Therapeutic exercises 72868 __ minutes      Mat Hager, PT, DPT  PT [id number]

## 2021-10-27 NOTE — PROGRESS NOTES
Internal Medicine Progress Note    Patient's name: Sandra Chavis  : 1955  Chief complaints (on day of admission): Shortness of Breath (Pt stated a month and is getting worse), Fatigue, and Hallucinations  Admission date: 10/23/2021  Date of service: 10/27/2021   Room: [place] [address]  Primary care physician: Ryan Morris DO  Reason for visit: Follow-up for COPD exacerbation    Subjective  Ashok Khan was seen and examined at bedside     10-27 she was seen with BiPAP on this morning. She denies any new complaints today. Patient was informed of the negative DVT study. Patient still feels very well and is in good spirits and has no new complaints     Discussed with patient and case management need for home NIV, case mgmt states tomorrow they should have an answer and if it is yes they will be able to get it set up for tomorrow. 10-26 she was seen eating breakfast this morning. She complains of some coughing. She denies any other new complaints at this time. 10-25 She states she feels 100% better from yesterday but that with exertion she still becomes quite light headed and hypoxic     Current treatment plan discussed and all questions answered. Current medications being prescribed discussed and patient expresses verbal understanding       Review of Systems  There are no new complaints of chest pain, shortness of breath, abdominal pain, nausea, vomiting, diarrhea, constipation unless otherwise mentioned above.      Hospital Medications  Current Facility-Administered Medications   Medication Dose Route Frequency Provider Last Rate Last Admin    predniSONE (DELTASONE) tablet 40 mg  40 mg Oral Daily Gregor Fothergill, APRN - CNP        Arformoterol Tartrate (BROVANA) nebulizer solution 15 mcg  15 mcg Nebulization BID Remedios Torres MD   15 mcg at 10/26/21 2135    budesonide (PULMICORT) nebulizer suspension 500 mcg  0.5 mg Nebulization BID Remedios Torres MD   500 mcg at 10/26/21 2135    sodium Juliana Lundbergiter, DO        dextrose 5 % solution  100 mL/hr IntraVENous PRN Juliana LAURA Edna, DO        albuterol (PROVENTIL) nebulizer solution 2.5 mg  2.5 mg Nebulization Q4H PRN Juliana Bishop, DO   2.5 mg at 10/24/21 0815    ipratropium-albuterol (DUONEB) nebulizer solution 1 ampule  1 ampule Inhalation Q4H WA Cori Kumar LINWOOD - CNP   1 ampule at 10/26/21 2135    sertraline (ZOLOFT) tablet 50 mg  50 mg Oral Daily Juliana Bishop, DO   50 mg at 10/26/21 0751       PRN Medications  sodium chloride flush, sodium chloride, potassium chloride **OR** potassium alternative oral replacement **OR** potassium chloride, senna, acetaminophen **OR** acetaminophen, glucose, dextrose, glucagon (rDNA), dextrose, albuterol    Objective  Most Recent Recorded Vitals  BP (!) 92/49   Pulse 75   Temp 97.8 °F (36.6 °C) (Oral)   Resp 20   Ht 5' 3\" (1.6 m)   Wt 222 lb (100.7 kg)   SpO2 94%   BMI 39.33 kg/m²   I/O last 3 completed shifts: In: 360 [P.O.:360]  Out: 1400 [Urine:1400]  No intake/output data recorded.     Physical Exam:  General: AAO to person/place/time/purpose, NAD, no labored breathing  Eyes: conjunctivae/corneas clear, sclera non icteric  Skin: color/texture/turgor normal, no rashes or lesions  Lungs: CTAB, no retractions/use of accessory muscles, no vocal fremitus, no rhonchi, no crackle, no rales  Heart: regular rate, regular rhythm, no murmur  Abdomen: soft, NT, bowel sounds normal  Extremities: atraumatic, no edema  Neurologic: cranial nerves 2-12 grossly intact, no slurred speech    Most Recent Labs  Lab Results   Component Value Date    WBC 6.8 10/27/2021    HGB 11.3 (L) 10/27/2021    HCT 38.9 10/27/2021     10/27/2021     10/27/2021    K 4.1 10/27/2021    CL 94 (L) 10/27/2021    CREATININE 1.1 (H) 10/27/2021    BUN 33 (H) 10/27/2021    CO2 40 (H) 10/27/2021    GLUCOSE 104 (H) 10/27/2021    ALT 25 10/27/2021    AST 19 10/27/2021    TSH 5.630 (H) 03/17/2020    LABA1C 6.0 (H) 12/17/2019 US DUP LOWER EXTREMITIES BILATERAL VENOUS   Final Result   1. Bilateral lower extremities negative for deep venous thrombosis. .         CT HEAD WO CONTRAST   Final Result   No acute intracranial abnormality. XR CHEST (2 VW)   Final Result   No acute process. Assessment   Active Hospital Problems    Diagnosis     COPD exacerbation ([facility] [J44.1]     Essential hypertension [I10]          Plan    · Acute Exacerbation COPD/ Acute on chronic hypoxic and hypercapnic Respiratory failure  · Baseline 2 L O2 nc at home   · Prednisone 40 daily   · Brovana, Pulmicort   · Bipap q4 on off - qhs -- will need bipap at home   · ABGs per pulm   · Pulm is on board   · Acute Delirium  · Continue to monitor - resolved   · Hyponatremia/hypochloremia  · Resolved   · Continue home medications other than HCTZ  · PT/OT   · Follow labs  · DVT prophylaxis Lovenox   ·  for discharge planning  · Discharge plan: 1-2 days, needs home NIV set up     Patient was seen and evaluated by myself and my attending Margie Marrero MD. Assessment and Plan discussed with attending provider, please see attestation for final plan of care. Desirae Gagnon DO   10/27/2021  9:31 AM     Addendum: I have personally participated in a face-to-face history and physical exam on the date of service with the patient. I have discussed the case with the resident. I also participated in medical decision making with the resident on the date of service and I agree with all of the pertinent clinical information unless indicated in my editing of the note. I have reviewed and edited the note above based on my findings during my history, exam, and decision making on the same day of service. Electronically signed by Margie Marrero MD on 10/27/2021 at 1:02 PM    I can be reached through [address] [place] [address].

## 2021-10-28 VITALS
BODY MASS INDEX: 40.06 KG/M2 | TEMPERATURE: 98.1 F | HEIGHT: 63 IN | SYSTOLIC BLOOD PRESSURE: 133 MMHG | OXYGEN SATURATION: 92 % | HEART RATE: 96 BPM | DIASTOLIC BLOOD PRESSURE: 61 MMHG | WEIGHT: 226.1 LBS | RESPIRATION RATE: 18 BRPM

## 2021-10-28 LAB
ALBUMIN SERPL-MCNC: 3.4 G/DL (ref 3.5–5.2)
ALP BLD-CCNC: 62 U/L (ref 35–104)
ALT SERPL-CCNC: 26 U/L (ref 0–32)
ANION GAP SERPL CALCULATED.3IONS-SCNC: 2 MMOL/L (ref 7–16)
AST SERPL-CCNC: 18 U/L (ref 0–31)
BASOPHILS ABSOLUTE: 0 E9/L (ref 0–0.2)
BASOPHILS RELATIVE PERCENT: 0 % (ref 0–2)
BILIRUB SERPL-MCNC: 0.3 MG/DL (ref 0–1.2)
BUN BLDV-MCNC: 28 MG/DL (ref 6–23)
CALCIUM SERPL-MCNC: 8.7 MG/DL (ref 8.6–10.2)
CHLORIDE BLD-SCNC: 95 MMOL/L (ref 98–107)
CO2: 39 MMOL/L (ref 22–29)
CREAT SERPL-MCNC: 0.7 MG/DL (ref 0.5–1)
EOSINOPHILS ABSOLUTE: 0.02 E9/L (ref 0.05–0.5)
EOSINOPHILS RELATIVE PERCENT: 0.4 % (ref 0–6)
GFR AFRICAN AMERICAN: >60
GFR NON-AFRICAN AMERICAN: >60 ML/MIN/1.73
GLUCOSE BLD-MCNC: 107 MG/DL (ref 74–99)
HCT VFR BLD CALC: 35.5 % (ref 34–48)
HEMOGLOBIN: 10.6 G/DL (ref 11.5–15.5)
IMMATURE GRANULOCYTES #: 0.02 E9/L
IMMATURE GRANULOCYTES %: 0.4 % (ref 0–5)
LYMPHOCYTES ABSOLUTE: 1.07 E9/L (ref 1.5–4)
LYMPHOCYTES RELATIVE PERCENT: 18.7 % (ref 20–42)
MCH RBC QN AUTO: 30.5 PG (ref 26–35)
MCHC RBC AUTO-ENTMCNC: 29.9 % (ref 32–34.5)
MCV RBC AUTO: 102.3 FL (ref 80–99.9)
METER GLUCOSE: 135 MG/DL (ref 74–99)
METER GLUCOSE: 176 MG/DL (ref 74–99)
METER GLUCOSE: 94 MG/DL (ref 74–99)
MONOCYTES ABSOLUTE: 0.47 E9/L (ref 0.1–0.95)
MONOCYTES RELATIVE PERCENT: 8.2 % (ref 2–12)
NEUTROPHILS ABSOLUTE: 4.13 E9/L (ref 1.8–7.3)
NEUTROPHILS RELATIVE PERCENT: 72.3 % (ref 43–80)
PDW BLD-RTO: 13.8 FL (ref 11.5–15)
PLATELET # BLD: 141 E9/L (ref 130–450)
PMV BLD AUTO: 11.3 FL (ref 7–12)
POTASSIUM REFLEX MAGNESIUM: 4.2 MMOL/L (ref 3.5–5)
RBC # BLD: 3.47 E12/L (ref 3.5–5.5)
SODIUM BLD-SCNC: 136 MMOL/L (ref 132–146)
TOTAL PROTEIN: 6.1 G/DL (ref 6.4–8.3)
WBC # BLD: 5.7 E9/L (ref 4.5–11.5)

## 2021-10-28 PROCEDURE — 94640 AIRWAY INHALATION TREATMENT: CPT

## 2021-10-28 PROCEDURE — 6370000000 HC RX 637 (ALT 250 FOR IP): Performed by: NURSE PRACTITIONER

## 2021-10-28 PROCEDURE — 36415 COLL VENOUS BLD VENIPUNCTURE: CPT

## 2021-10-28 PROCEDURE — 2580000003 HC RX 258: Performed by: INTERNAL MEDICINE

## 2021-10-28 PROCEDURE — 2700000000 HC OXYGEN THERAPY PER DAY

## 2021-10-28 PROCEDURE — 6360000002 HC RX W HCPCS: Performed by: STUDENT IN AN ORGANIZED HEALTH CARE EDUCATION/TRAINING PROGRAM

## 2021-10-28 PROCEDURE — 94660 CPAP INITIATION&MGMT: CPT

## 2021-10-28 PROCEDURE — 80053 COMPREHEN METABOLIC PANEL: CPT

## 2021-10-28 PROCEDURE — 82962 GLUCOSE BLOOD TEST: CPT

## 2021-10-28 PROCEDURE — 85025 COMPLETE CBC W/AUTO DIFF WBC: CPT

## 2021-10-28 PROCEDURE — 6360000002 HC RX W HCPCS: Performed by: INTERNAL MEDICINE

## 2021-10-28 PROCEDURE — 6370000000 HC RX 637 (ALT 250 FOR IP): Performed by: INTERNAL MEDICINE

## 2021-10-28 RX ORDER — ALBUTEROL SULFATE 2.5 MG/3ML
2.5 SOLUTION RESPIRATORY (INHALATION) 4 TIMES DAILY
Status: DISCONTINUED | OUTPATIENT
Start: 2021-10-28 | End: 2021-10-28 | Stop reason: HOSPADM

## 2021-10-28 RX ORDER — IPRATROPIUM BROMIDE AND ALBUTEROL SULFATE 2.5; .5 MG/3ML; MG/3ML
3 SOLUTION RESPIRATORY (INHALATION) EVERY 4 HOURS PRN
Qty: 360 ML | Refills: 0 | Status: SHIPPED | OUTPATIENT
Start: 2021-10-28

## 2021-10-28 RX ORDER — PREDNISONE 20 MG/1
40 TABLET ORAL DAILY
Qty: 6 TABLET | Refills: 0 | Status: SHIPPED | OUTPATIENT
Start: 2021-10-29 | End: 2021-11-01

## 2021-10-28 RX ADMIN — INSULIN LISPRO 1 UNITS: 100 INJECTION, SOLUTION INTRAVENOUS; SUBCUTANEOUS at 16:03

## 2021-10-28 RX ADMIN — PANTOPRAZOLE SODIUM 40 MG: 40 TABLET, DELAYED RELEASE ORAL at 05:37

## 2021-10-28 RX ADMIN — ATORVASTATIN CALCIUM 20 MG: 40 TABLET, FILM COATED ORAL at 09:49

## 2021-10-28 RX ADMIN — AMLODIPINE BESYLATE 10 MG: 10 TABLET ORAL at 09:49

## 2021-10-28 RX ADMIN — Medication 10 ML: at 09:50

## 2021-10-28 RX ADMIN — PREDNISONE 40 MG: 20 TABLET ORAL at 09:49

## 2021-10-28 RX ADMIN — SERTRALINE HYDROCHLORIDE 50 MG: 50 TABLET ORAL at 09:49

## 2021-10-28 RX ADMIN — BUDESONIDE 500 MCG: 0.5 SUSPENSION RESPIRATORY (INHALATION) at 09:01

## 2021-10-28 RX ADMIN — ENOXAPARIN SODIUM 40 MG: 40 INJECTION SUBCUTANEOUS at 09:49

## 2021-10-28 RX ADMIN — IPRATROPIUM BROMIDE AND ALBUTEROL SULFATE 1 AMPULE: .5; 2.5 SOLUTION RESPIRATORY (INHALATION) at 13:32

## 2021-10-28 RX ADMIN — ARFORMOTEROL TARTRATE 15 MCG: 15 SOLUTION RESPIRATORY (INHALATION) at 09:01

## 2021-10-28 RX ADMIN — LISINOPRIL 20 MG: 20 TABLET ORAL at 09:50

## 2021-10-28 RX ADMIN — IPRATROPIUM BROMIDE AND ALBUTEROL SULFATE 1 AMPULE: .5; 2.5 SOLUTION RESPIRATORY (INHALATION) at 09:01

## 2021-10-28 ASSESSMENT — PAIN SCALES - GENERAL: PAINLEVEL_OUTOF10: 0

## 2021-10-28 NOTE — CARE COORDINATION
Social Work/Discharge Planning:  [facility] with [address] states patient NIV was approved. He will need to know discharge date and time. Notified patient of NIV approval.  RN completed pulse oxygen testing. Patient will need increase oxygen. Notified Adelita with [facility] and faxed increase oxygen order with chart notes.   Electronically signed by LES Ivy on 10/28/2021 at 10:38 AM

## 2021-10-28 NOTE — PROGRESS NOTES
Internal Medicine Progress Note    Patient's name: Prudencio Paulson  : 1955  Chief complaints (on day of admission): Shortness of Breath (Pt stated a month and is getting worse), Fatigue, and Hallucinations  Admission date: 10/23/2021  Date of service: 10/28/2021   Room: [address] 1  Primary care physician: Tania Leonard DO  Reason for visit: Follow-up for COPD exacerbation    Subjective  Laurie Doss was seen and examined at bedside     10-28 Patient doing well today, no complaints, she is at her baseline oxygen requirement and in good spirits. Spoke with case management and her home NIV has been set up and is ready. I spoke with pulmonology and discussed discharge they are okay with it and they will follow up with her in outpatient office in 3 to 4 weeks    10-27 she was seen with BiPAP on this morning. She denies any new complaints today. Patient was informed of the negative DVT study. Patient still feels very well and is in good spirits and has no new complaints     Discussed with patient and case management need for home NIV, case mgmt states tomorrow they should have an answer and if it is yes they will be able to get it set up for tomorrow. 10-26 she was seen eating breakfast this morning. She complains of some coughing. She denies any other new complaints at this time. 10-25 She states she feels 100% better from yesterday but that with exertion she still becomes quite light headed and hypoxic     Current treatment plan discussed and all questions answered. Current medications being prescribed discussed and patient expresses verbal understanding       Review of Systems  There are no new complaints of chest pain, shortness of breath, abdominal pain, nausea, vomiting, diarrhea, constipation unless otherwise mentioned above.      Hospital Medications  Current Facility-Administered Medications   Medication Dose Route Frequency Provider Last Rate Last Admin    albuterol (PROVENTIL) (HUMALOG) injection vial 0-6 Units  0-6 Units SubCUTAneous TID WC Juliana LAURA Edna, DO   1 Units at 10/27/21 1610    insulin lispro (HUMALOG) injection vial 0-3 Units  0-3 Units SubCUTAneous Nightly Juliana E Edna, DO   1 Units at 10/26/21 2017    glucose (GLUTOSE) 40 % oral gel 15 g  15 g Oral PRN Juliana E Edna, DO        dextrose 50 % IV solution  12.5 g IntraVENous PRN Juliana E Edna, DO        glucagon (rDNA) injection 1 mg  1 mg IntraMUSCular PRN Juliana E Edna, DO        dextrose 5 % solution  100 mL/hr IntraVENous PRN Juliana E Edna, DO        ipratropium-albuterol (DUONEB) nebulizer solution 1 ampule  1 ampule Inhalation Q4H KIA Larsen, APRN - CNP   1 ampule at 10/27/21 2024    sertraline (ZOLOFT) tablet 50 mg  50 mg Oral Daily Juliana Bishop, DO   50 mg at 10/27/21 0957       PRN Medications  sodium chloride flush, sodium chloride, potassium chloride **OR** potassium alternative oral replacement **OR** potassium chloride, senna, acetaminophen **OR** acetaminophen, glucose, dextrose, glucagon (rDNA), dextrose    Objective  Most Recent Recorded Vitals  /63   Pulse 81   Temp 98.2 °F (36.8 °C) (Oral)   Resp 20   Ht 5' 3\" (1.6 m)   Wt 226 lb 1.6 oz (102.6 kg)   SpO2 95%   BMI 40.05 kg/m²   I/O last 3 completed shifts: In: 5 [P.O.:420]  Out: -   No intake/output data recorded.     Physical Exam:  General: AAO to person/place/time/purpose, NAD, no labored breathing  Eyes: conjunctivae/corneas clear, sclera non icteric  Skin: color/texture/turgor normal, no rashes or lesions  Lungs: CTAB, no retractions/use of accessory muscles, no vocal fremitus, no rhonchi, no crackle, no rales  Heart: regular rate, regular rhythm, no murmur  Abdomen: soft, NT, bowel sounds normal  Extremities: atraumatic, no edema  Neurologic: cranial nerves 2-12 grossly intact, no slurred speech    Most Recent Labs  Lab Results   Component Value Date    WBC 5.7 10/28/2021    HGB 10.6 (L) 10/28/2021 HCT 35.5 10/28/2021     10/28/2021     10/28/2021    K 4.2 10/28/2021    CL 95 (L) 10/28/2021    CREATININE 0.7 10/28/2021    BUN 28 (H) 10/28/2021    CO2 39 (H) 10/28/2021    GLUCOSE 107 (H) 10/28/2021    ALT 26 10/28/2021    AST 18 10/28/2021    TSH 5.630 (H) 03/17/2020    LABA1C 6.0 (H) 12/17/2019       US DUP LOWER EXTREMITIES BILATERAL VENOUS   Final Result   1. Bilateral lower extremities negative for deep venous thrombosis. .         CT HEAD WO CONTRAST   Final Result   No acute intracranial abnormality. XR CHEST (2 VW)   Final Result   No acute process. Assessment   Active Hospital Problems    Diagnosis     COPD exacerbation ([facility] [J44.1]     Essential hypertension [I10]          Plan    · Acute Exacerbation COPD/ Acute on chronic hypoxic and hypercapnic Respiratory failure  · Baseline 2 L O2 nc at home   · Prednisone taper per pulm  · Brovana, Pulmicort   · Bipap q4 on off - qhs -- will need bipap at home   · ABGs per pulm   · Pulm is on board   · Acute Delirium  · Continue to monitor - resolved   · Hyponatremia/hypochloremia  · Resolved   · Continue home medications other than HCTZ  · PT/OT   · Follow labs  · DVT prophylaxis Lovenox   ·  for discharge planning  · Discharge plan: DC today with NIV and steroid taper, spoke with pulm, continue home inhalers, rx for prn nebs, fu pulm 3-4 weeks      Electronically signed by Radha Medellin MD on 10/28/2021 at 8:41 AM    I can be reached through [employer].

## 2021-10-28 NOTE — PROGRESS NOTES
Pulse ox was 88% on room air at rest.     Applied oxygen  Pulse ox was 95% on 2 liters of oxygen     Ambulating pulse ox   Pulse ox was 82% on 3 liters of oxygen  Pulse ox was 82% on 4 liters of oxygen    Recovered ambulating pulse ox  Pulse ox was 95% on 5 liters of oxygen

## 2021-10-28 NOTE — PROGRESS NOTES
Date: 10/27/2021    Time: 9:31 PM    Patient Placed On BIPAP/CPAP/ Non-Invasive Ventilation? Yes    If no must comment. Facial area red/color change? No           If YES are Blister/Lesion present? No   If yes must notify nursing staff  BIPAP/CPAP skin barrier?   Yes    Skin barrier type:mepilexlite       Comments:        Pau Villalpando RCP

## 2021-10-28 NOTE — PROGRESS NOTES
Pulmonary Progress Note    Admit Date: 10/23/2021  Requesting Physician: Caty Sanchez DO      Subjective:  Resting in bed on 3L. Pox 97%   Feels alert and comfortable, tolerated AVAPs overnight. Medications:     sodium chloride      dextrose        albuterol  2.5 mg Nebulization 4x daily    predniSONE  40 mg Oral Daily    Arformoterol Tartrate  15 mcg Nebulization BID    budesonide  0.5 mg Nebulization BID    sodium chloride flush  10 mL IntraVENous 2 times per day    enoxaparin  40 mg SubCUTAneous Daily    amLODIPine  10 mg Oral Daily    lisinopril  20 mg Oral Daily    pantoprazole  40 mg Oral Daily with breakfast    atorvastatin  20 mg Oral Daily    insulin lispro  0-6 Units SubCUTAneous TID WC    insulin lispro  0-3 Units SubCUTAneous Nightly    ipratropium-albuterol  1 ampule Inhalation Q4H WA    sertraline  50 mg Oral Daily         Vitals:    VITALS:  /63   Pulse 81   Temp 98.2 °F (36.8 °C) (Oral)   Resp 20   Ht 5' 3\" (1.6 m)   Wt 226 lb 1.6 oz (102.6 kg)   SpO2 95%   BMI 40.05 kg/m²   24HR INTAKE/OUTPUT:      Intake/Output Summary (Last 24 hours) at 10/28/2021 0825  Last data filed at 10/27/2021 1247  Gross per 24 hour   Intake 420 ml   Output --   Net 420 ml     CURRENT PULSE OXIMETRY:  SpO2: 95 %  24HR PULSE OXIMETRY RANGE:  SpO2  Av.5 %  Min: 94 %  Max: 99 %      EXAM:  General: No distress. ENT: Moist mucous membranes  Neck: Trachea midline. Resp: No accessory muscle use. Diminished throughout. CV: Regular rate. Regular rhythm. No mumur or rub. ABD: Obese, non-tender. Non-distended. Normal bowel sounds. Skin: Warm and dry. No rash on exposed extremities. Ext: No joint deformity. No cyanosis. BLE with 1+ edema  Neuro: Awake.  Follows commands      Lab Results:  CBC:   Recent Labs     10/26/21  0540 10/27/21  0314 10/28/21  0300   WBC 6.5 6.8 5.7   HGB 11.2* 11.3* 10.6*   HCT 37.9 38.9 35.5   .6* 104.3* 102.3*    141 141     BMP: Recent Labs     10/26/21  0540 10/27/21  0314 10/28/21  0300    140 136   K 4.0 4.1 4.2   CL 95* 94* 95*   CO2 37* 40* 39*   BUN 29* 33* 28*   CREATININE 0.9 1.1* 0.7     LFT:   Recent Labs     10/26/21  0540 10/27/21  0314 10/28/21  0300   ALKPHOS 68 68 62   ALT 24 25 26   AST 21 19 18   PROT 6.4 6.7 6.1*   BILITOT 0.3 0.4 0.3   LABALBU 3.5 3.7 3.4*     PT/INR: No results for input(s): PROTIME, INR in the last 72 hours. Cultures:  No results for input(s): CULTRESP in the last 72 hours. Results for Hubert Sera (MRN [id number]) as of 10/24/2021    Ref.  Range 10/24/2021 12:37   Influenza A by PCR Latest Ref Range: Not Detected  Not Detected   Influenza B by PCR Latest Ref Range: Not Detected  Not Detected   Adenovirus by PCR Latest Ref Range: Not Detected  Not Detected   Coronavirus 229E by PCR Latest Ref Range: Not Detected  Not Detected   Coronavirus HKU1 by PCR Latest Ref Range: Not Detected  Not Detected   Coronavirus NL63 by PCR Latest Ref Range: Not Detected  Not Detected   Coronavirus OC43 by PCR Latest Ref Range: Not Detected  Not Detected   Human Metapneumovirus by PCR Latest Ref Range: Not Detected  Not Detected   Human Rhinovirus/Enterovirus by PCR Latest Ref Range: Not Detected  Not Detected   Parainfluenza Virus 1 by PCR Latest Ref Range: Not Detected  Not Detected   Parainfluenza Virus 2 by PCR Latest Ref Range: Not Detected  Not Detected   Parainfluenza Virus 3 by PCR Latest Ref Range: Not Detected  Not Detected   Parainfluenza Virus 4 by PCR Latest Ref Range: Not Detected  Not Detected   Respiratory Syncytial Virus by PCR Latest Ref Range: Not Detected  Not Detected   Bordetella parapertussis by PCR Latest Ref Range: Not Detected  Not Detected   Chlamydophilia pneumoniae by PCR Latest Ref Range: Not Detected  Not Detected   Mycoplasma pneumoniae by PCR Latest Ref Range: Not Detected  Not Detected   SARS-CoV-2, PCR Latest Ref Range: Not Detected  Not Detected   Bordetella pertussis by PCR Latest Ref Range: Not Detected  Not Detected       ABG:   Recent Labs     10/27/21  1203   PH 7.326*   PO2 77.3   PCO2 72.8*   HCO3 37.2*   BE 8.8*   O2SAT 94.9       Films:  XR CHEST 10/23/2021: The lungs are without acute focal process. There is no effusion or pneumothorax. The cardiomediastinal silhouette is without acute process. The osseous structures are without acute process. No acute process. CT HEAD WO CONTRAST 10/24/2021: BRAIN/VENTRICLES: There is no acute intracranial hemorrhage, mass effect or midline shift. No abnormal extra-axial fluid collection. The gray-white differentiation is maintained without evidence of an acute infarct. There is no evidence of hydrocephalus. ORBITS: The visualized portion of the orbits demonstrate no acute abnormality. SINUSES: The visualized paranasal sinuses demonstrate no acute abnormality. SOFT TISSUES/SKULL:  No acute abnormality of the visualized skull or soft tissues. No acute intracranial abnormality. US neg for DVT 10/26/21    Assessment:  74 y.o. unvaccinated female presented to the ED with increased dyspnea, cough and wheezing over the past 3 days with delirium and   10/23 ABG remarkable for respiratory acidosis with metabolic alkalosis pH: 4.041 PCO2: 105.6  HCO3:52.2  10/24 COVID negative   CT head negative  Chest x-ray negative  Urine drug screen negative  10/25 AVAPS; 4L nc  10/26 AVAPS on/off q4; 2L nc - Vt adjusted to 500  10/27 same  10/28 AVAPs overnight; 3L this morning    · Acute on chronic hypoxemic/hypercapnic respiratory failure  · Metabolic alkalosis severe hypochloremia  · Hyponatremia  · COPD exacerbation  · Probable obesity hypoventilation syndrome  ·  unvaccinated  · HTN  · HLD  · BMI 40.7  · Previous 3/9/2020 ED visit - abd pain; CT abd - cyst of pancreas (new)     Plan:  · On AVAPS 4hrs on/4 hrs off; abg improved to 7.32/72/77/37/95%, recommend NIV at HS and prn in the daytime, will need this for home.  With chronic respiratory failure secondary to severe COPD. The patient will benefit of the use of noninvasive ventilator at home to prevent exacerbations and readmissions. (previously pCO2 as high as 105, pH 7.31). · Solumedrol to prednisone 40mg to QD on 10/27, will taper. · Continue duoneb - on Albuterol and Spiriva at home   · Respiratory panel negative  · IS  · Is considering COVID vaccine    O2 brought to 2L this morning. Will taper prednisone. Stable from a pulmonary standpoint but will need NIV and walking O2 prior to discharge. Electronically signed by Meryle Im, APRN - CNP on 10/28/2021 at 8:25 AM      Seen and evaluated, agree with above assessment and plan. Okay to discharge home from pulmonary perspective  Follow-up as an outpatient in 1 to 2 weeks  Discussed with patient and .   Encouraged to take the COVID-19 vaccine

## 2021-10-28 NOTE — DISCHARGE SUMMARY
Internal Medicine Discharge Summary    NAME: Ines Ardon :  1955  MRN:  [id number] PCP:Tanmay Mejia DO    ADMITTED: 10/23/2021   DISCHARGED: No discharge date for patient encounter. ADMITTING PHYSICIAN: Anish Brock MD    PCP: Carito Ocampo DO    CONSULTANT(S):   IP CONSULT TO INTERNAL MEDICINE  IP CONSULT TO PULMONOLOGY  IP CONSULT TO CASE MANAGEMENT  IP CONSULT TO SOCIAL WORK     ADMITTING DIAGNOSIS:   Acute delirium [R41.0]  Respiratory acidosis [E87.2]  COPD exacerbation ([address] [J44.1]     Please see H&P for further details    DISCHARGE DIAGNOSES:   Active Hospital Problems    Diagnosis     COPD exacerbation ([address] [J44.1]     Essential hypertension [I10]        BRIEF HISTORY OF PRESENT ILLNESS: Ines Ardon is a 74 y.o. female patient of Carito Ocampo DO who  has a past medical history of COPD (chronic obstructive pulmonary disease) ([address] Hyperlipidemia, and Hypertension. who originally had concerns including Shortness of Breath (Pt stated a month and is getting worse), Fatigue, and Hallucinations. at presentation on 10/23/2021, and was found to have Acute delirium [R41.0]  Respiratory acidosis [E87.2]  COPD exacerbation ([address] [J44.1] after workup. Please see H&P for further details. HOSPITAL COURSE:   The patient presented to the hospital with the chief complaint of Shortness of Breath (Pt stated a month and is getting worse), Fatigue, and Hallucinations  .  The patient was admitted to the hospital.     · Acute Exacerbation COPD/ Acute on chronic hypoxic and hypercapnic Respiratory failure  · Baseline 2 L O2 nc at home   · Prednisone taper per pulm  · Brovana, Pulmicort   · Bipap q4 on off - qhs -- will need bipap at home   · ABGs per pulm   · Pulm is on board   · Acute Delirium  · Continue to monitor - resolved   · Hyponatremia/hypochloremia  · Resolved   · Continue home medications other than HCTZ  · PT/OT   · Follow labs  · DVT prophylaxis Lovenox   ·  for discharge planning  Discharge plan: DC today with NIV and steroid taper, spoke with pulm, continue home inhalers, rx for prn nebs, fu pulm 3-4 weeks        BRIEF PHYSICAL EXAMINATION AND LABORATORIES ON DAY OF DISCHARGE:  VITALS:  /61   Pulse 96   Temp 98.1 °F (36.7 °C) (Tympanic)   Resp 18   Ht 5' 3\" (1.6 m)   Wt 226 lb 1.6 oz (102.6 kg)   SpO2 92%   BMI 40.05 kg/m²       Please see note from the same day. LABS[de-identified]  Recent Labs     10/26/21  0540 10/27/21  0314 10/28/21  0300    140 136   K 4.0 4.1 4.2   CL 95* 94* 95*   CO2 37* 40* 39*   BUN 29* 33* 28*   CREATININE 0.9 1.1* 0.7   GLUCOSE 106* 104* 107*   CALCIUM 9.0 9.2 8.7     Recent Labs     10/26/21  0540 10/27/21  0314 10/28/21  0300   ALKPHOS 68 68 62   ALT 24 25 26   AST 21 19 18   PROT 6.4 6.7 6.1*   BILITOT 0.3 0.4 0.3   LABALBU 3.5 3.7 3.4*     Recent Labs     10/26/21  0540 10/27/21  0314 10/28/21  0300   WBC 6.5 6.8 5.7   RBC 3.66 3.73 3.47*   HGB 11.2* 11.3* 10.6*   HCT 37.9 38.9 35.5   .6* 104.3* 102.3*   MCH 30.6 30.3 30.5   MCHC 29.6* 29.0* 29.9*   RDW 13.9 13.8 13.8    141 141   MPV 11.0 11.1 11.3     Lab Results   Component Value Date    LABA1C 6.0 (H) 12/17/2019    LABA1C 6.1 (H) 09/30/2019    LABA1C 5.9 (H) 03/18/2019     No results found for: INR, PROTIME   Lab Results   Component Value Date    TSH 5.630 (H) 03/17/2020    TSH 2.83 03/18/2019     Lab Results   Component Value Date    TRIG 138 03/17/2020    TRIG 91 09/30/2019    TRIG 147 03/18/2019    HDL 51 03/17/2020    HDL 53 09/30/2019    HDL 52 03/18/2019    LDLCALC 80 03/17/2020    LDLCALC 89 09/30/2019    LDLCALC 160 (H) 03/18/2019     No results for input(s): MG in the last 72 hours. No results for input(s): CKTOTAL, CKMB, TROPONINI in the last 72 hours. No results for input(s): LACTA in the last 72 hours.     IMAGING:  XR CHEST (2 VW)    Result Date: 10/23/2021  EXAMINATION: TWO XRAY VIEWS OF THE CHEST 10/23/2021 10:27 pm COMPARISON: I03/09/2020 HISTORY: ORDERING SYSTEM PROVIDED HISTORY: SOB TECHNOLOGIST PROVIDED HISTORY: Reason for exam:->SOB FINDINGS: The lungs are without acute focal process. There is no effusion or pneumothorax. The cardiomediastinal silhouette is without acute process. The osseous structures are without acute process. No acute process. CT HEAD WO CONTRAST    Result Date: 10/24/2021  EXAMINATION: CT OF THE HEAD WITHOUT CONTRAST  10/24/2021 12:09 am TECHNIQUE: CT of the head was performed without the administration of intravenous contrast. Dose modulation, iterative reconstruction, and/or weight based adjustment of the mA/kV was utilized to reduce the radiation dose to as low as reasonably achievable. COMPARISON: None. HISTORY: ORDERING SYSTEM PROVIDED HISTORY: AMS TECHNOLOGIST PROVIDED HISTORY: Reason for exam:->AMS Has a \"code stroke\" or \"stroke alert\" been called? ->No Decision Support Exception - unselect if not a suspected or confirmed emergency medical condition->Emergency Medical Condition (MA) FINDINGS: BRAIN/VENTRICLES: There is no acute intracranial hemorrhage, mass effect or midline shift. No abnormal extra-axial fluid collection. The gray-white differentiation is maintained without evidence of an acute infarct. There is no evidence of hydrocephalus. ORBITS: The visualized portion of the orbits demonstrate no acute abnormality. SINUSES: The visualized paranasal sinuses demonstrate no acute abnormality. SOFT TISSUES/SKULL:  No acute abnormality of the visualized skull or soft tissues. No acute intracranial abnormality. US DUP LOWER EXTREMITIES BILATERAL VENOUS    Result Date: 10/26/2021  EXAMINATION: DUPLEX VENOUS ULTRASOUND OF THE BILATERAL LOWER [id number]/26/2021  11:57 TECHNIQUE: Duplex ultrasound using B-mode/gray scaled imaging, Doppler spectral analysis and color flow Doppler was obtained of the deep venous structures of the lower bilateral extremities.  COMPARISON: None available to me at the time of this interpretation. HISTORY: ORDERING SYSTEM PROVIDED HISTORY: pain bilateral calves TECHNOLOGIST PROVIDED HISTORY: Reason for exam:->pain bilateral calves What reading provider will be dictating this exam?->CRC FINDINGS: The visualized portions of both lower extremities, from both common femoral veins, through both anterior/posterior tibial and peroneal veins, are negative for deep venous thrombosis by compression, color flow, and augmentation. No other ultrasonographic abnormalities are noted. .     1. Bilateral lower extremities negative for deep venous thrombosis. Trula Blew MICROBIOLOGY:  BLOOD CX #1  No results for input(s): BC in the last 72 hours. BLOOD CX #2  No results for input(s): Chalmer Stacks in the last 72 hours. TIP CULTURE  No results for input(s): CXCATHTIP in the last 72 hours. CULTURE, RESPIRATORY   No results for input(s): CULTRESP in the last 72 hours. RESPIRATORY SMEAR  No results for input(s): RESPSMEAR in the last 72 hours. ECHO:      DISPOSITION:  The patient's condition is fair.    The patient is being discharged to home    DISCHARGE MEDICATIONS:    Theadora Patch   Home Medication Instructions KFO:[id number]    Printed on:10/28/21 1813   Medication Information                      amLODIPine (NORVASC) 10 MG tablet  TAKE ONE TABLET BY MOUTH EVERY DAY             ipratropium-albuterol (DUONEB) 0.5-2.5 (3) MG/3ML SOLN nebulizer solution  Inhale 3 mLs into the lungs every 4 hours as needed for Shortness of Breath             lisinopril (PRINIVIL;ZESTRIL) 20 MG tablet  TAKE ONE TABLET BY MOUTH EVERY DAY             omeprazole (PRILOSEC) 20 MG delayed release capsule  Take 20 mg by mouth every morning (before breakfast)             predniSONE (DELTASONE) 20 MG tablet  Take 2 tablets by mouth daily for 3 days             sertraline (ZOLOFT) 50 MG tablet  Take 50 mg by mouth daily             simvastatin (ZOCOR) 40 MG tablet  TAKE ONE TABLET BY MOUTH EVERY DAY             SPIRIVA HANDIHALER 18 MCG inhalation capsule  INHALE ONE CAPSULE VIA HANDIHALER EVERY DAY             VENTOLIN  (90 Base) MCG/ACT inhaler  INHALE TWO PUFFS BY MOUTH FOUR TIMES A DAY                 Current Discharge Medication List        Current Discharge Medication List      STOP taking these medications       hydrochlorothiazide (HYDRODIURIL) 12.5 MG tablet Comments:   Reason for Stopping:         pantoprazole (PROTONIX) 40 MG tablet Comments:   Reason for Stopping:             Current Discharge Medication List      START taking these medications    Details   predniSONE (DELTASONE) 20 MG tablet Take 2 tablets by mouth daily for 3 days  Qty: 6 tablet, Refills: 0      ipratropium-albuterol (DUONEB) 0.5-2.5 (3) MG/3ML SOLN nebulizer solution Inhale 3 mLs into the lungs every 4 hours as needed for Shortness of Breath  Qty: 360 mL, Refills: 0             INTERNAL MEDICINE FOLLOW UP/INSTRUCTIONS:  · Follow-up with primary care physician within 1 week of discharge from hospital  · Please review changes to pre-hospital admission medications and prescriptions for new medications upon discharge from the hospital with PCP  · Please review results of labs and imaging studies with PCP  · Follow-up with consultants as directed by them   · If recurrence or worsening of symptoms please call primary care physician or return to the ER immediately  · Diet: ADULT DIET; Regular; Low Fat/Low Chol/High Fiber/MELQUIADES    Preparing for this patient's discharge, including paperwork, orders, instructions, and meeting with patient did required >35 minutes.     Electronically signed by Alexia Trent MD on 10/28/2021 at 6:13 PM

## 2021-11-22 ENCOUNTER — TELEPHONE (OUTPATIENT)
Dept: OTHER | Facility: CLINIC | Age: 66
End: 2021-11-22

## 2021-11-22 ENCOUNTER — APPOINTMENT (OUTPATIENT)
Dept: GENERAL RADIOLOGY | Age: 66
End: 2021-11-22
Payer: MEDICARE

## 2021-11-22 ENCOUNTER — HOSPITAL ENCOUNTER (EMERGENCY)
Age: 66
Discharge: HOME | End: 2021-11-23
Attending: EMERGENCY MEDICINE
Payer: MEDICARE

## 2021-11-22 DIAGNOSIS — J96.02 ACUTE RESPIRATORY FAILURE WITH HYPERCAPNIA (HCC): Primary | ICD-10-CM

## 2021-11-22 DIAGNOSIS — J44.1 COPD EXACERBATION (HCC): ICD-10-CM

## 2021-11-22 LAB
B.E.: 10.3 MMOL/L (ref -3–3)
BASOPHILS ABSOLUTE: 0.01 E9/L (ref 0–0.2)
BASOPHILS RELATIVE PERCENT: 0.1 % (ref 0–2)
COHB: 1 % (ref 0–1.5)
COMMENT: ABNORMAL
CRITICAL: ABNORMAL
D DIMER: 383 NG/ML DDU
DATE ANALYZED: ABNORMAL
DATE OF COLLECTION: ABNORMAL
EOSINOPHILS ABSOLUTE: 0.09 E9/L (ref 0.05–0.5)
EOSINOPHILS RELATIVE PERCENT: 0.7 % (ref 0–6)
HCO3: 40.2 MMOL/L (ref 22–26)
HCT VFR BLD CALC: 42.2 % (ref 34–48)
HEMOGLOBIN: 12.6 G/DL (ref 11.5–15.5)
HHB: 25.2 % (ref 0–5)
IMMATURE GRANULOCYTES #: 0.05 E9/L
IMMATURE GRANULOCYTES %: 0.4 % (ref 0–5)
LAB: ABNORMAL
LACTIC ACID: 1 MMOL/L (ref 0.5–2.2)
LYMPHOCYTES ABSOLUTE: 1.1 E9/L (ref 1.5–4)
LYMPHOCYTES RELATIVE PERCENT: 9.1 % (ref 20–42)
Lab: ABNORMAL
MCH RBC QN AUTO: 30.2 PG (ref 26–35)
MCHC RBC AUTO-ENTMCNC: 29.9 % (ref 32–34.5)
MCV RBC AUTO: 101.2 FL (ref 80–99.9)
METHB: 0.4 % (ref 0–1.5)
MODE: ABNORMAL
MONOCYTES ABSOLUTE: 0.64 E9/L (ref 0.1–0.95)
MONOCYTES RELATIVE PERCENT: 5.3 % (ref 2–12)
NEUTROPHILS ABSOLUTE: 10.21 E9/L (ref 1.8–7.3)
NEUTROPHILS RELATIVE PERCENT: 84.4 % (ref 43–80)
O2 CONTENT: 13.6 ML/DL
O2 SATURATION: 74.4 % (ref 92–98.5)
O2HB: 73.4 % (ref 94–97)
OPERATOR ID: 516
PATIENT TEMP: 37 C
PCO2: 83.9 MMHG (ref 35–45)
PDW BLD-RTO: 13.5 FL (ref 11.5–15)
PH BLOOD GAS: 7.3 (ref 7.35–7.45)
PLATELET # BLD: 154 E9/L (ref 130–450)
PMV BLD AUTO: 11 FL (ref 7–12)
PO2: 41.6 MMHG (ref 75–100)
RBC # BLD: 4.17 E12/L (ref 3.5–5.5)
SOURCE, BLOOD GAS: ABNORMAL
THB: 13.2 G/DL (ref 11.5–16.5)
TIME ANALYZED: 2321
WBC # BLD: 12.1 E9/L (ref 4.5–11.5)

## 2021-11-22 PROCEDURE — 83605 ASSAY OF LACTIC ACID: CPT

## 2021-11-22 PROCEDURE — 96374 THER/PROPH/DIAG INJ IV PUSH: CPT

## 2021-11-22 PROCEDURE — 84484 ASSAY OF TROPONIN QUANT: CPT

## 2021-11-22 PROCEDURE — 83880 ASSAY OF NATRIURETIC PEPTIDE: CPT

## 2021-11-22 PROCEDURE — 87040 BLOOD CULTURE FOR BACTERIA: CPT

## 2021-11-22 PROCEDURE — 85025 COMPLETE CBC W/AUTO DIFF WBC: CPT

## 2021-11-22 PROCEDURE — 85378 FIBRIN DEGRADE SEMIQUANT: CPT

## 2021-11-22 PROCEDURE — 80053 COMPREHEN METABOLIC PANEL: CPT

## 2021-11-22 PROCEDURE — 99285 EMERGENCY DEPT VISIT HI MDM: CPT

## 2021-11-22 PROCEDURE — 82805 BLOOD GASES W/O2 SATURATION: CPT

## 2021-11-22 PROCEDURE — 71045 X-RAY EXAM CHEST 1 VIEW: CPT

## 2021-11-23 ENCOUNTER — APPOINTMENT (OUTPATIENT)
Dept: CT IMAGING | Age: 66
End: 2021-11-23
Payer: MEDICARE

## 2021-11-23 ENCOUNTER — TELEPHONE (OUTPATIENT)
Dept: OTHER | Facility: CLINIC | Age: 66
End: 2021-11-23

## 2021-11-23 VITALS
DIASTOLIC BLOOD PRESSURE: 69 MMHG | HEART RATE: 92 BPM | SYSTOLIC BLOOD PRESSURE: 135 MMHG | OXYGEN SATURATION: 95 % | HEIGHT: 63 IN | TEMPERATURE: 97.3 F | WEIGHT: 210 LBS | RESPIRATION RATE: 20 BRPM | BODY MASS INDEX: 37.21 KG/M2

## 2021-11-23 LAB
ALBUMIN SERPL-MCNC: 3.5 G/DL (ref 3.5–5.2)
ALP BLD-CCNC: 105 U/L (ref 35–104)
ALT SERPL-CCNC: 15 U/L (ref 0–32)
AMORPHOUS: ABNORMAL
ANION GAP SERPL CALCULATED.3IONS-SCNC: 7 MMOL/L (ref 7–16)
AST SERPL-CCNC: 15 U/L (ref 0–31)
B.E.: 5.7 MMOL/L (ref -3–3)
BACTERIA: ABNORMAL /HPF
BILIRUB SERPL-MCNC: 0.5 MG/DL (ref 0–1.2)
BILIRUBIN URINE: ABNORMAL
BLOOD, URINE: NEGATIVE
BUN BLDV-MCNC: 15 MG/DL (ref 6–23)
CALCIUM SERPL-MCNC: 9.1 MG/DL (ref 8.6–10.2)
CHLORIDE BLD-SCNC: 92 MMOL/L (ref 98–107)
CLARITY: ABNORMAL
CO2: 35 MMOL/L (ref 22–29)
COHB: 0.8 % (ref 0–1.5)
COLOR: YELLOW
CREAT SERPL-MCNC: 0.7 MG/DL (ref 0.5–1)
CRITICAL: ABNORMAL
DATE ANALYZED: ABNORMAL
DATE OF COLLECTION: ABNORMAL
GFR AFRICAN AMERICAN: >60
GFR NON-AFRICAN AMERICAN: >60 ML/MIN/1.73
GLUCOSE BLD-MCNC: 147 MG/DL (ref 74–99)
GLUCOSE URINE: NEGATIVE MG/DL
HCO3: 34.3 MMOL/L (ref 22–26)
HHB: 3.4 % (ref 0–5)
KETONES, URINE: NEGATIVE MG/DL
LAB: ABNORMAL
LEUKOCYTE ESTERASE, URINE: NEGATIVE
Lab: ABNORMAL
METHB: 0.3 % (ref 0–1.5)
MODE: ABNORMAL
NITRITE, URINE: NEGATIVE
O2 CONTENT: 17.4 ML/DL
O2 SATURATION: 96.6 % (ref 92–98.5)
O2HB: 95.5 % (ref 94–97)
OPERATOR ID: 516
PATIENT TEMP: 37 C
PCO2: 71.4 MMHG (ref 35–45)
PH BLOOD GAS: 7.3 (ref 7.35–7.45)
PH UA: 5.5 (ref 5–9)
PO2: 91.3 MMHG (ref 75–100)
POTASSIUM SERPL-SCNC: 4.2 MMOL/L (ref 3.5–5)
PRO-BNP: 228 PG/ML (ref 0–125)
PROTEIN UA: 30 MG/DL
RBC UA: ABNORMAL /HPF (ref 0–2)
SODIUM BLD-SCNC: 134 MMOL/L (ref 132–146)
SOURCE, BLOOD GAS: ABNORMAL
SPECIFIC GRAVITY UA: >=1.03 (ref 1–1.03)
THB: 12.9 G/DL (ref 11.5–16.5)
TIME ANALYZED: 236
TOTAL PROTEIN: 7.4 G/DL (ref 6.4–8.3)
TROPONIN, HIGH SENSITIVITY: 12 NG/L (ref 0–9)
UROBILINOGEN, URINE: 1 E.U./DL
WBC UA: ABNORMAL /HPF (ref 0–5)

## 2021-11-23 PROCEDURE — 6360000004 HC RX CONTRAST MEDICATION: Performed by: RADIOLOGY

## 2021-11-23 PROCEDURE — 94640 AIRWAY INHALATION TREATMENT: CPT

## 2021-11-23 PROCEDURE — 6370000000 HC RX 637 (ALT 250 FOR IP): Performed by: STUDENT IN AN ORGANIZED HEALTH CARE EDUCATION/TRAINING PROGRAM

## 2021-11-23 PROCEDURE — 6370000000 HC RX 637 (ALT 250 FOR IP): Performed by: EMERGENCY MEDICINE

## 2021-11-23 PROCEDURE — 81001 URINALYSIS AUTO W/SCOPE: CPT

## 2021-11-23 PROCEDURE — 71260 CT THORAX DX C+: CPT

## 2021-11-23 PROCEDURE — 6360000002 HC RX W HCPCS: Performed by: EMERGENCY MEDICINE

## 2021-11-23 PROCEDURE — 82805 BLOOD GASES W/O2 SATURATION: CPT

## 2021-11-23 RX ORDER — IPRATROPIUM BROMIDE AND ALBUTEROL SULFATE 2.5; .5 MG/3ML; MG/3ML
1 SOLUTION RESPIRATORY (INHALATION) ONCE
Status: COMPLETED | OUTPATIENT
Start: 2021-11-23 | End: 2021-11-23

## 2021-11-23 RX ORDER — METHYLPREDNISOLONE SODIUM SUCCINATE 125 MG/2ML
125 INJECTION, POWDER, LYOPHILIZED, FOR SOLUTION INTRAMUSCULAR; INTRAVENOUS ONCE
Status: COMPLETED | OUTPATIENT
Start: 2021-11-23 | End: 2021-11-23

## 2021-11-23 RX ORDER — IPRATROPIUM BROMIDE AND ALBUTEROL SULFATE 2.5; .5 MG/3ML; MG/3ML
2 SOLUTION RESPIRATORY (INHALATION) ONCE
Status: COMPLETED | OUTPATIENT
Start: 2021-11-23 | End: 2021-11-23

## 2021-11-23 RX ORDER — PREDNISONE 20 MG/1
20 TABLET ORAL DAILY
Qty: 5 TABLET | Refills: 0 | Status: SHIPPED | OUTPATIENT
Start: 2021-11-23 | End: 2021-11-28

## 2021-11-23 RX ORDER — ACETAMINOPHEN 500 MG
1000 TABLET ORAL ONCE
Status: COMPLETED | OUTPATIENT
Start: 2021-11-23 | End: 2021-11-23

## 2021-11-23 RX ADMIN — IPRATROPIUM BROMIDE AND ALBUTEROL SULFATE 1 AMPULE: .5; 3 SOLUTION RESPIRATORY (INHALATION) at 00:29

## 2021-11-23 RX ADMIN — ACETAMINOPHEN 1000 MG: 500 TABLET ORAL at 00:20

## 2021-11-23 RX ADMIN — IPRATROPIUM BROMIDE AND ALBUTEROL SULFATE 2 AMPULE: .5; 3 SOLUTION RESPIRATORY (INHALATION) at 01:02

## 2021-11-23 RX ADMIN — IOPAMIDOL 80 ML: 755 INJECTION, SOLUTION INTRAVENOUS at 02:41

## 2021-11-23 RX ADMIN — METHYLPREDNISOLONE SODIUM SUCCINATE 125 MG: 125 INJECTION, POWDER, FOR SOLUTION INTRAMUSCULAR; INTRAVENOUS at 00:20

## 2021-11-23 ASSESSMENT — PAIN SCALES - GENERAL
PAINLEVEL_OUTOF10: 0
PAINLEVEL_OUTOF10: 5

## 2021-11-23 ASSESSMENT — PAIN DESCRIPTION - PAIN TYPE: TYPE: ACUTE PAIN

## 2021-11-23 ASSESSMENT — PAIN DESCRIPTION - DESCRIPTORS: DESCRIPTORS: HEADACHE

## 2021-11-23 ASSESSMENT — PAIN DESCRIPTION - LOCATION: LOCATION: HEAD

## 2021-11-23 ASSESSMENT — PAIN SCALES - WONG BAKER: WONGBAKER_NUMERICALRESPONSE: 0

## 2021-11-23 NOTE — TELEPHONE ENCOUNTER
[facility] contacted pt regarding ED follow up appt. Pt picked the phone up twice and hung up on this writer. Unable to schedule follow up.

## 2021-11-23 NOTE — ED NOTES
Report to [facility] CHEMICAL DEPENDENCY [facility], RN  11/22/21 2090

## 2021-11-23 NOTE — ED NOTES
Assumed care of patient. Pt lying in bed in no apparent distress. Dr Erin Mason and  at bedside.      Benny Archuleta RN  11/23/21 0001

## 2021-11-23 NOTE — ED NOTES
FIRST PROVIDER CONTACT ASSESSMENT NOTE      Department of Emergency Medicine   Admit Date: No admission date for patient encounter. Chief Complaint: Cough (productive, greenish, hx COPD, wears 3L NC all the time ) and Shortness of Breath (x a couple days, states difficulty urinating )      History of Present Illness:    Iron Kearney is a 74 y.o. female who presents to the ED for cough x 2 days, history of copd, on 3L oxygen. Is having productive cough with green sputum. She denies fevers or feeling like she is coming down with a cold  Physical exam: Tachycardic heart rate regular, scattered wheezing and diminished breath sounds all 4 quadrants. Soft abdomen.         -----------------END OF FIRST PROVIDER CONTACT ASSESSMENT NOTE--------------  Electronically signed by Arthur Pa PA-C   DD: 11/22/21               Arthur Pa PA-C  11/22/21 1913

## 2021-11-23 NOTE — ED PROVIDER NOTES
HPI:  21, Time: 10:49 PM PITO Braun is a 74 y.o. female presenting to the ED for shortness of breath, beginning a couple days ago. It is associated with cough with green phlegm, and difficulty and decreased urination (but no dysuria). The complaint has been persistent, moderate in severity, and worsened by nothing. She wears 3L NC at [facility], has had too increase it to 5L. She reports recent admission to hospital because her \"ABGs had pCO2 in 130s and if was seeing things\". Medical record shows pCO2s were in 70-90 range. Patient denies fever/chills, sore throat, chest pain, edema, headache, visual disturbances, focal paresthesias, focal weakness, abdominal pain, nausea, vomiting, diarrhea, constipation, dysuria, hematuria, trauma, neck or back pain, rash or other complaints. ROS:   A complete review of systems was performed and all pertinent positives and negatives are stated within HPI, all other systems reviewed and are negative.      --------------------------------------------- PAST HISTORY ---------------------------------------------  Past Medical History:  has a past medical history of COPD (chronic obstructive pulmonary disease) ([facility] [address] Hyperlipidemia, and Hypertension. Past Surgical History:  has a past surgical history that includes  section. Social History:  reports that she quit smoking about 9 years ago. Her smoking use included cigarettes. She started smoking about 50 years ago. She has a 31.00 pack-year smoking history. She has never used smokeless tobacco. She reports previous alcohol use. She reports that she does not use drugs. Family History: family history includes COPD in her father; Cancer in her mother. The patients home medications have been reviewed. Allergies: Patient has no known allergies.         ----------------------------------------PHYSICAL EXAM--------------------------------------  Constitutional:  Well developed, well nourished, no acute distress, non-toxic appearance   Eyes:  PERRL, conjunctiva normal, EOMI  HENT:  Atraumatic, external ears normal, nose normal, oropharynx moist. Neck- normal range of motion, no nuchal rigidity   Respiratory:  No respiratory distress, diffusely diminished breath sounds but clear, no rales, no wheezing, no crackles  Cardiovascular:  Tachycardic rate, normal rhythm, no murmurs, no gallops, no rubs. Radial and DP pulses 2+ bilaterally. GI:  Soft, nondistended, normal bowel sounds, nontender, no organomegaly, no mass, no rebound, no guarding   :  No costovertebral angle tenderness   Musculoskeletal:  No edema, no tenderness, no deformities. Integument:  Well hydrated, no visible rash. Adequate perfusion. Lymphatic:  No cervical lymphadenopathy noted   Neurologic:  Alert & oriented x 3, CN 2-12 normal, no focal deficits noted. Psychiatric:  Speech and behavior appropriate       -------------------------------------------------- RESULTS -------------------------------------------------  I have personally reviewed all laboratory and imaging results for this patient. Results are listed below.      LABS:  Results for orders placed or performed during the hospital encounter of 11/22/21   Troponin   Result Value Ref Range    Troponin, High Sensitivity 12 (H) 0 - 9 ng/L   CBC auto differential   Result Value Ref Range    WBC 12.1 (H) 4.5 - 11.5 E9/L    RBC 4.17 3.50 - 5.50 E12/L    Hemoglobin 12.6 11.5 - 15.5 g/dL    Hematocrit 42.2 34.0 - 48.0 %    .2 (H) 80.0 - 99.9 fL    MCH 30.2 26.0 - 35.0 pg    MCHC 29.9 (L) 32.0 - 34.5 %    RDW 13.5 11.5 - 15.0 fL    Platelets 221 998 - 104 E9/L    MPV 11.0 7.0 - 12.0 fL    Neutrophils % 84.4 (H) 43.0 - 80.0 %    Immature Granulocytes % 0.4 0.0 - 5.0 %    Lymphocytes % 9.1 (L) 20.0 - 42.0 %    Monocytes % 5.3 2.0 - 12.0 %    Eosinophils % 0.7 0.0 - 6.0 %    Basophils % 0.1 0.0 - 2.0 %    Neutrophils Absolute 10.21 (H) 1.80 - 7.30 E9/L    Immature Granulocytes # 0.05 E9/L    Lymphocytes Absolute 1.10 (L) 1.50 - 4.00 E9/L    Monocytes Absolute 0.64 0.10 - 0.95 E9/L    Eosinophils Absolute 0.09 0.05 - 0.50 E9/L    Basophils Absolute 0.01 0.00 - 0.20 E9/L   Comprehensive Metabolic Panel   Result Value Ref Range    Sodium 134 132 - 146 mmol/L    Potassium 4.2 3.5 - 5.0 mmol/L    Chloride 92 (L) 98 - 107 mmol/L    CO2 35 (H) 22 - 29 mmol/L    Anion Gap 7 7 - 16 mmol/L    Glucose 147 (H) 74 - 99 mg/dL    BUN 15 6 - 23 mg/dL    CREATININE 0.7 0.5 - 1.0 mg/dL    GFR Non-African American >60 >=60 mL/min/1.73    GFR African American >60     Calcium 9.1 8.6 - 10.2 mg/dL    Total Protein 7.4 6.4 - 8.3 g/dL    Albumin 3.5 3.5 - 5.2 g/dL    Total Bilirubin 0.5 0.0 - 1.2 mg/dL    Alkaline Phosphatase 105 (H) 35 - 104 U/L    ALT 15 0 - 32 U/L    AST 15 0 - 31 U/L   Lactic Acid, Plasma   Result Value Ref Range    Lactic Acid 1.0 0.5 - 2.2 mmol/L   D-Dimer, Quantitative   Result Value Ref Range    D-Dimer, Quant 383 ng/mL DDU   Brain Natriuretic Peptide   Result Value Ref Range    Pro- (H) 0 - 125 pg/mL   Blood Gas, Arterial   Result Value Ref Range    Date Analyzed [id number]     Time Analyzed 2321     Source: Blood Arterial     pH, Blood Gas 7.298 (L) 7.350 - 7.450    PCO2 83.9 (HH) 35.0 - 45.0 mmHg    PO2 41.6 (LL) 75.0 - 100.0 mmHg    HCO3 40.2 (H) 22.0 - 26.0 mmol/L    B.E. 10.3 (H) -3.0 - 3.0 mmol/L    O2 Sat 74.4 (L) 92.0 - 98.5 %    O2Hb 73.4 (L) 94.0 - 97.0 %    COHb 1.0 0.0 - 1.5 %    MetHb 0.4 0.0 - 1.5 %    O2 Content 13.6 mL/dL    HHb 25.2 (H) 0.0 - 5.0 %    tHb (est) 13.2 11.5 - 16.5 g/dL    Mode NC- 5 L     Comment mixed venous.       Date Of Collection      Time Collected      Pt Temp 37.0 C     ID [id number]     Lab [id number]     Critical(s) Notified Handed report to Dr/RN        RADIOLOGY:  Interpreted by Radiologist.  XR CHEST PORTABLE    (Results Pending)         EKG Interpretation  Time: 00:08 AM EDT  Rhythm: normal sinus   Rate: 99  Axis: normal  Conduction: low voltage QRS  ST Segments: no acute change  T Waves: no acute change  Clinical Impression: non-specific EKG  Comparison to prior EKG: stable as compared to patient's most recent EKG      ------------------------- NURSING NOTES AND VITALS REVIEWED ---------------------------  The nursing notes within the ED encounter and vital signs as below have been reviewed by myself. BP (!) 144/67   Pulse 103   Temp 98.5 °F (36.9 °C) (Oral)   Resp 22   Ht 5' 3\" (1.6 m)   Wt 210 lb (95.3 kg)   SpO2 94%   BMI 37.20 kg/m²   Oxygen Saturation Interpretation: Normal      The patients available past medical records and past encounters were reviewed. ------------------------------ ED COURSE/MEDICAL DECISION MAKING----------------------  Medications   acetaminophen (TYLENOL) tablet 1,000 mg (has no administration in time range)   methylPREDNISolone sodium (SOLU-MEDROL) injection 125 mg (has no administration in time range)   ipratropium-albuterol (DUONEB) nebulizer solution 1 ampule (has no administration in time range)           Procedures:   none      Medical Decision Making:    ABG shows elevated pCO2 with pH 7.2 (mixed venous?). This isn't completely off from her previous ABGs. She states her pCO2 upon discharge last time was in 76s and when she was admitted it was 130 (was awake at that time but hallucinating)   IV solumedrol, duoneb, (tylenol for headache per request of patient)   Patient care turned over to Dr Alexis Topete at 12 midnight pending results and further treatment and disposition    This patient's ED course included: re-evaluation prior to disposition, IV medications, cardiac monitoring, continuous pulse oximetry and a personal history and physicial eaxmination    This patient has remained hemodynamically stable, improved and been closely monitored during their ED course. Re-Evaluations:  Time:   11:35 PM EST   Re-evaluation.   Patients symptoms are improving  Repeat physical examination is not changed      Consultations:   none    Critical Care: none    IKim, DO, am the Primary Provider of Record    Counseling: The emergency provider has spoken with the patient and spouse/SO and discussed todays results, in addition to providing specific details for the plan of care and counseling regarding the diagnosis and prognosis. Questions are answered at this time and they are agreeable with the plan.    --------------------------- IMPRESSION AND DISPOSITION ---------------------------------    IMPRESSION  1. Acute respiratory failure with hypercapnia (HCC)    2.  COPD exacerbation ([facility] [address]        DISPOSITION  Disposition: Admit as per Dr Eun Alexander  Patient condition is stable             Caroline Brown DO  11/23/21 0018

## 2021-11-23 NOTE — ED NOTES
Respiratory therapist notified of breathing tx ordered; unable to come to ED at this time d/t ICU emergency.       Flaquita Barbosa RN  11/23/21 0105

## 2021-11-23 NOTE — ED PROVIDER NOTES
This patient was signed out to me pending lab studies as well as reassessment. CT PE is negative. Patient had significant wheezing so I did administer 2 more treatments  and significant improvement is noted. I repeated a blood gas which reveals chronic respiratory acidosis with metabolic compensation actually improved from the patient's normal baseline. We had shared decision-making patient requests outpatient management will discharge with outpatient follow-up. She is not desaturating. Ambulates without difficulty. Family is at bedside to take the patient home.     Do Hernandez, DO  Emergency Medicine     Florina Mccabe DO  11/23/21 5911

## 2021-11-23 NOTE — ED NOTES
Discharge instructions given and reviewed with patient and . RX escribed. Instructed to follow up with Dr Shayne Edwards. Questions and concerns addressed. Pt departed ED in w/c with staff and  in no apparent distress. Personal belongings taken.      Sal Suárez RN  11/23/21 7802

## 2021-11-25 ENCOUNTER — HOSPITAL ENCOUNTER (EMERGENCY)
Age: 66
Discharge: HOME | End: 2021-11-25
Attending: STUDENT IN AN ORGANIZED HEALTH CARE EDUCATION/TRAINING PROGRAM
Payer: MEDICARE

## 2021-11-25 ENCOUNTER — APPOINTMENT (OUTPATIENT)
Dept: ULTRASOUND IMAGING | Age: 66
End: 2021-11-25
Payer: MEDICARE

## 2021-11-25 ENCOUNTER — APPOINTMENT (OUTPATIENT)
Dept: GENERAL RADIOLOGY | Age: 66
End: 2021-11-25
Payer: MEDICARE

## 2021-11-25 ENCOUNTER — TELEPHONE (OUTPATIENT)
Dept: OTHER | Facility: CLINIC | Age: 66
End: 2021-11-25

## 2021-11-25 ENCOUNTER — APPOINTMENT (OUTPATIENT)
Dept: CT IMAGING | Age: 66
End: 2021-11-25
Payer: MEDICARE

## 2021-11-25 VITALS
HEIGHT: 63 IN | OXYGEN SATURATION: 95 % | WEIGHT: 210 LBS | RESPIRATION RATE: 15 BRPM | TEMPERATURE: 97.1 F | HEART RATE: 34 BPM | DIASTOLIC BLOOD PRESSURE: 62 MMHG | BODY MASS INDEX: 37.21 KG/M2 | SYSTOLIC BLOOD PRESSURE: 130 MMHG

## 2021-11-25 DIAGNOSIS — K76.0 HEPATIC STEATOSIS: ICD-10-CM

## 2021-11-25 DIAGNOSIS — K86.9 PANCREATIC LESION: ICD-10-CM

## 2021-11-25 DIAGNOSIS — J18.9 PNEUMONIA OF BOTH LUNGS DUE TO INFECTIOUS ORGANISM, UNSPECIFIED PART OF LUNG: Primary | ICD-10-CM

## 2021-11-25 LAB
ALBUMIN SERPL-MCNC: 3.4 G/DL (ref 3.5–5.2)
ALP BLD-CCNC: 100 U/L (ref 35–104)
ALT SERPL-CCNC: 22 U/L (ref 0–32)
ANION GAP SERPL CALCULATED.3IONS-SCNC: 8 MMOL/L (ref 7–16)
AST SERPL-CCNC: 15 U/L (ref 0–31)
BASOPHILS ABSOLUTE: 0.01 E9/L (ref 0–0.2)
BASOPHILS RELATIVE PERCENT: 0.1 % (ref 0–2)
BILIRUB SERPL-MCNC: 0.3 MG/DL (ref 0–1.2)
BUN BLDV-MCNC: 31 MG/DL (ref 6–23)
CALCIUM SERPL-MCNC: 9.1 MG/DL (ref 8.6–10.2)
CHLORIDE BLD-SCNC: 96 MMOL/L (ref 98–107)
CO2: 36 MMOL/L (ref 22–29)
CREAT SERPL-MCNC: 0.6 MG/DL (ref 0.5–1)
D DIMER: 284 NG/ML DDU
EKG ATRIAL RATE: 100 BPM
EKG P AXIS: 73 DEGREES
EKG P-R INTERVAL: 128 MS
EKG Q-T INTERVAL: 346 MS
EKG QRS DURATION: 90 MS
EKG QTC CALCULATION (BAZETT): 446 MS
EKG R AXIS: 60 DEGREES
EKG T AXIS: 55 DEGREES
EKG VENTRICULAR RATE: 100 BPM
EOSINOPHILS ABSOLUTE: 0.01 E9/L (ref 0.05–0.5)
EOSINOPHILS RELATIVE PERCENT: 0.1 % (ref 0–6)
GFR AFRICAN AMERICAN: >60
GFR NON-AFRICAN AMERICAN: >60 ML/MIN/1.73
GLUCOSE BLD-MCNC: 168 MG/DL (ref 74–99)
HCT VFR BLD CALC: 41.3 % (ref 34–48)
HEMOGLOBIN: 12.2 G/DL (ref 11.5–15.5)
IMMATURE GRANULOCYTES #: 0.07 E9/L
IMMATURE GRANULOCYTES %: 0.6 % (ref 0–5)
LIPASE: 17 U/L (ref 13–60)
LYMPHOCYTES ABSOLUTE: 0.64 E9/L (ref 1.5–4)
LYMPHOCYTES RELATIVE PERCENT: 5.2 % (ref 20–42)
MAGNESIUM: 2 MG/DL (ref 1.6–2.6)
MCH RBC QN AUTO: 30.6 PG (ref 26–35)
MCHC RBC AUTO-ENTMCNC: 29.5 % (ref 32–34.5)
MCV RBC AUTO: 103.5 FL (ref 80–99.9)
MONOCYTES ABSOLUTE: 0.67 E9/L (ref 0.1–0.95)
MONOCYTES RELATIVE PERCENT: 5.4 % (ref 2–12)
NEUTROPHILS ABSOLUTE: 11.01 E9/L (ref 1.8–7.3)
NEUTROPHILS RELATIVE PERCENT: 88.6 % (ref 43–80)
PDW BLD-RTO: 13.5 FL (ref 11.5–15)
PLATELET # BLD: 192 E9/L (ref 130–450)
PMV BLD AUTO: 10.9 FL (ref 7–12)
POTASSIUM SERPL-SCNC: 4 MMOL/L (ref 3.5–5)
RBC # BLD: 3.99 E12/L (ref 3.5–5.5)
SARS-COV-2, NAAT: NOT DETECTED
SODIUM BLD-SCNC: 140 MMOL/L (ref 132–146)
TOTAL PROTEIN: 7.4 G/DL (ref 6.4–8.3)
TROPONIN, HIGH SENSITIVITY: 10 NG/L (ref 0–9)
TROPONIN, HIGH SENSITIVITY: 13 NG/L (ref 0–9)
WBC # BLD: 12.4 E9/L (ref 4.5–11.5)

## 2021-11-25 PROCEDURE — 71275 CT ANGIOGRAPHY CHEST: CPT

## 2021-11-25 PROCEDURE — 6370000000 HC RX 637 (ALT 250 FOR IP): Performed by: STUDENT IN AN ORGANIZED HEALTH CARE EDUCATION/TRAINING PROGRAM

## 2021-11-25 PROCEDURE — 83735 ASSAY OF MAGNESIUM: CPT

## 2021-11-25 PROCEDURE — 6360000004 HC RX CONTRAST MEDICATION: Performed by: RADIOLOGY

## 2021-11-25 PROCEDURE — 80053 COMPREHEN METABOLIC PANEL: CPT

## 2021-11-25 PROCEDURE — 84484 ASSAY OF TROPONIN QUANT: CPT

## 2021-11-25 PROCEDURE — 94640 AIRWAY INHALATION TREATMENT: CPT

## 2021-11-25 PROCEDURE — 76705 ECHO EXAM OF ABDOMEN: CPT

## 2021-11-25 PROCEDURE — 93005 ELECTROCARDIOGRAM TRACING: CPT | Performed by: STUDENT IN AN ORGANIZED HEALTH CARE EDUCATION/TRAINING PROGRAM

## 2021-11-25 PROCEDURE — 83690 ASSAY OF LIPASE: CPT

## 2021-11-25 PROCEDURE — 94664 DEMO&/EVAL PT USE INHALER: CPT

## 2021-11-25 PROCEDURE — 85378 FIBRIN DEGRADE SEMIQUANT: CPT

## 2021-11-25 PROCEDURE — 6360000002 HC RX W HCPCS: Performed by: STUDENT IN AN ORGANIZED HEALTH CARE EDUCATION/TRAINING PROGRAM

## 2021-11-25 PROCEDURE — 96374 THER/PROPH/DIAG INJ IV PUSH: CPT

## 2021-11-25 PROCEDURE — 87635 SARS-COV-2 COVID-19 AMP PRB: CPT

## 2021-11-25 PROCEDURE — 71045 X-RAY EXAM CHEST 1 VIEW: CPT

## 2021-11-25 PROCEDURE — 85025 COMPLETE CBC W/AUTO DIFF WBC: CPT

## 2021-11-25 PROCEDURE — 99284 EMERGENCY DEPT VISIT MOD MDM: CPT

## 2021-11-25 PROCEDURE — 2580000003 HC RX 258: Performed by: STUDENT IN AN ORGANIZED HEALTH CARE EDUCATION/TRAINING PROGRAM

## 2021-11-25 RX ORDER — FENTANYL CITRATE 50 UG/ML
50 INJECTION, SOLUTION INTRAMUSCULAR; INTRAVENOUS ONCE
Status: COMPLETED | OUTPATIENT
Start: 2021-11-25 | End: 2021-11-25

## 2021-11-25 RX ORDER — CEFDINIR 300 MG/1
300 CAPSULE ORAL ONCE
Status: COMPLETED | OUTPATIENT
Start: 2021-11-25 | End: 2021-11-25

## 2021-11-25 RX ORDER — CEFDINIR 300 MG/1
300 CAPSULE ORAL 2 TIMES DAILY
Qty: 20 CAPSULE | Refills: 0 | Status: SHIPPED | OUTPATIENT
Start: 2021-11-25 | End: 2021-12-05

## 2021-11-25 RX ORDER — IPRATROPIUM BROMIDE AND ALBUTEROL SULFATE 2.5; .5 MG/3ML; MG/3ML
1 SOLUTION RESPIRATORY (INHALATION)
Status: COMPLETED | OUTPATIENT
Start: 2021-11-25 | End: 2021-11-25

## 2021-11-25 RX ORDER — DOXYCYCLINE HYCLATE 100 MG
100 TABLET ORAL 2 TIMES DAILY
Qty: 20 TABLET | Refills: 0 | Status: SHIPPED | OUTPATIENT
Start: 2021-11-25 | End: 2021-12-05

## 2021-11-25 RX ORDER — 0.9 % SODIUM CHLORIDE 0.9 %
1000 INTRAVENOUS SOLUTION INTRAVENOUS ONCE
Status: COMPLETED | OUTPATIENT
Start: 2021-11-25 | End: 2021-11-25

## 2021-11-25 RX ORDER — DOXYCYCLINE HYCLATE 100 MG/1
100 CAPSULE ORAL ONCE
Status: COMPLETED | OUTPATIENT
Start: 2021-11-25 | End: 2021-11-25

## 2021-11-25 RX ORDER — ACETAMINOPHEN 500 MG
1000 TABLET ORAL ONCE
Status: COMPLETED | OUTPATIENT
Start: 2021-11-25 | End: 2021-11-25

## 2021-11-25 RX ADMIN — SODIUM CHLORIDE 1000 ML: 9 INJECTION, SOLUTION INTRAVENOUS at 07:49

## 2021-11-25 RX ADMIN — FENTANYL CITRATE 50 MCG: 50 INJECTION, SOLUTION INTRAMUSCULAR; INTRAVENOUS at 07:50

## 2021-11-25 RX ADMIN — IPRATROPIUM BROMIDE AND ALBUTEROL SULFATE 1 AMPULE: .5; 3 SOLUTION RESPIRATORY (INHALATION) at 08:26

## 2021-11-25 RX ADMIN — ACETAMINOPHEN 1000 MG: 500 TABLET ORAL at 08:01

## 2021-11-25 RX ADMIN — DOXYCYCLINE HYCLATE 100 MG: 100 CAPSULE ORAL at 10:23

## 2021-11-25 RX ADMIN — IOPAMIDOL 80 ML: 755 INJECTION, SOLUTION INTRAVENOUS at 09:27

## 2021-11-25 RX ADMIN — IPRATROPIUM BROMIDE AND ALBUTEROL SULFATE 1 AMPULE: .5; 3 SOLUTION RESPIRATORY (INHALATION) at 08:28

## 2021-11-25 RX ADMIN — CEFDINIR 300 MG: 300 CAPSULE ORAL at 10:23

## 2021-11-25 RX ADMIN — IPRATROPIUM BROMIDE AND ALBUTEROL SULFATE 1 AMPULE: .5; 3 SOLUTION RESPIRATORY (INHALATION) at 08:27

## 2021-11-25 ASSESSMENT — PAIN DESCRIPTION - PAIN TYPE: TYPE: ACUTE PAIN

## 2021-11-25 ASSESSMENT — PAIN SCALES - GENERAL
PAINLEVEL_OUTOF10: 9

## 2021-11-25 ASSESSMENT — ENCOUNTER SYMPTOMS
STRIDOR: 0
NAUSEA: 1
ABDOMINAL DISTENTION: 0
COUGH: 0
VOMITING: 1
SHORTNESS OF BREATH: 0
DIARRHEA: 0
COLOR CHANGE: 0
WHEEZING: 0
CONSTIPATION: 0
ABDOMINAL PAIN: 0
BACK PAIN: 0

## 2021-11-25 ASSESSMENT — PAIN DESCRIPTION - ORIENTATION: ORIENTATION: RIGHT

## 2021-11-25 ASSESSMENT — PAIN DESCRIPTION - LOCATION: LOCATION: FLANK

## 2021-11-25 NOTE — ED PROVIDER NOTES
[address]      Pt Name: Trinity Del Rio  MRN: [id number]  [place] 1955  Date of evaluation: 11/25/2021      CHIEF COMPLAINT       Chief Complaint   Patient presents with    Flank Pain        HPI  Trinity Del Rio is a 74 y.o. female past medical history of hypertension, on asthma, presents with complaints of flank pain. Patient was recently admitted in the hospital and discharged home 11/22/21 for COPD exacerbation. States that today she started having right upper quadrant abdominal pain. Described as sharp, aching, located in. Exacerbated with deep breaths. Admits to productive cough for 1 week, and eating pork chops with a baked potato last night. Denies any dysuria, hematuria, diarrhea. Except as noted above the remainder of the review of systems was reviewed and negative. Review of Systems   Constitutional: Negative for activity change, appetite change, diaphoresis, fatigue, fever and unexpected weight change. HENT: Negative for congestion. Eyes: Negative for visual disturbance. Respiratory: Negative for cough, shortness of breath, wheezing and stridor. Cardiovascular: Positive for chest pain. Negative for palpitations and leg swelling. Gastrointestinal: Positive for nausea and vomiting. Negative for abdominal distention, abdominal pain, constipation and diarrhea. Endocrine: Negative for polyphagia and polyuria. Genitourinary: Negative for dysuria and hematuria. Musculoskeletal: Negative for back pain. Skin: Negative for color change, pallor, rash and wound. Neurological: Negative for dizziness and syncope. Hematological: Negative for adenopathy. Does not bruise/bleed easily. Psychiatric/Behavioral: Negative for agitation. Physical Exam  Vitals and nursing note reviewed. Constitutional:       General: She is not in acute distress. Appearance: Normal appearance.  She is not ill-appearing or diaphoretic. HENT:      Head: Normocephalic and atraumatic. Right Ear: External ear normal.      Left Ear: External ear normal.      Nose: Nose normal.      Mouth/Throat:      Pharynx: No oropharyngeal exudate or posterior oropharyngeal erythema. Eyes:      Extraocular Movements: Extraocular movements intact. Pupils: Pupils are equal, round, and reactive to light. Cardiovascular:      Rate and Rhythm: Normal rate and regular rhythm. Pulses: Normal pulses. Heart sounds: Normal heart sounds. Pulmonary:      Effort: Pulmonary effort is normal.      Breath sounds: Normal breath sounds. Abdominal:      General: Abdomen is flat. Bowel sounds are normal.      Palpations: Abdomen is soft. Tenderness: There is abdominal tenderness. There is no right CVA tenderness, left CVA tenderness or rebound. Negative signs include Schaefer's sign, Rovsing's sign and McBurney's sign. Comments: ruq abdominal tenderness    Musculoskeletal:         General: Normal range of motion. Cervical back: Normal range of motion. Skin:     General: Skin is warm and dry. Capillary Refill: Capillary refill takes less than 2 seconds. Neurological:      General: No focal deficit present. Mental Status: She is alert and oriented to person, place, and time. Psychiatric:         Mood and Affect: Mood normal.          Procedures     MDM  Number of Diagnoses or Management Options  Hepatic steatosis  Pancreatic lesion  Pneumonia of both lungs due to infectious organism, unspecified part of lung  Diagnosis management comments: 68-year-old female recently discharged from the hospital for COPD exacerbation of complaints of right flank pain. Vitals significant for tachycardia. On physical exam patient is in no acute distress on her 4 L of oxygen at home. Lungs have diminished breath sounds throughout all lung fields. No chest tenderness or rib tenderness on palpation.   Does have has no known allergies.     -------------------------------------------------- RESULTS -------------------------------------------------  Labs:  Results for orders placed or performed during the hospital encounter of 11/25/21   COVID-19, Rapid    Specimen: Nasopharyngeal Swab   Result Value Ref Range    SARS-CoV-2, NAAT Not Detected Not Detected   CBC Auto Differential   Result Value Ref Range    WBC 12.4 (H) 4.5 - 11.5 E9/L    RBC 3.99 3.50 - 5.50 E12/L    Hemoglobin 12.2 11.5 - 15.5 g/dL    Hematocrit 41.3 34.0 - 48.0 %    .5 (H) 80.0 - 99.9 fL    MCH 30.6 26.0 - 35.0 pg    MCHC 29.5 (L) 32.0 - 34.5 %    RDW 13.5 11.5 - 15.0 fL    Platelets 718 881 - 105 E9/L    MPV 10.9 7.0 - 12.0 fL    Neutrophils % 88.6 (H) 43.0 - 80.0 %    Immature Granulocytes % 0.6 0.0 - 5.0 %    Lymphocytes % 5.2 (L) 20.0 - 42.0 %    Monocytes % 5.4 2.0 - 12.0 %    Eosinophils % 0.1 0.0 - 6.0 %    Basophils % 0.1 0.0 - 2.0 %    Neutrophils Absolute 11.01 (H) 1.80 - 7.30 E9/L    Immature Granulocytes # 0.07 E9/L    Lymphocytes Absolute 0.64 (L) 1.50 - 4.00 E9/L    Monocytes Absolute 0.67 0.10 - 0.95 E9/L    Eosinophils Absolute 0.01 (L) 0.05 - 0.50 E9/L    Basophils Absolute 0.01 0.00 - 0.20 E9/L   Comprehensive Metabolic Panel   Result Value Ref Range    Sodium 140 132 - 146 mmol/L    Potassium 4.0 3.5 - 5.0 mmol/L    Chloride 96 (L) 98 - 107 mmol/L    CO2 36 (H) 22 - 29 mmol/L    Anion Gap 8 7 - 16 mmol/L    Glucose 168 (H) 74 - 99 mg/dL    BUN 31 (H) 6 - 23 mg/dL    CREATININE 0.6 0.5 - 1.0 mg/dL    GFR Non-African American >60 >=60 mL/min/1.73    GFR African American >60     Calcium 9.1 8.6 - 10.2 mg/dL    Total Protein 7.4 6.4 - 8.3 g/dL    Albumin 3.4 (L) 3.5 - 5.2 g/dL    Total Bilirubin 0.3 0.0 - 1.2 mg/dL    Alkaline Phosphatase 100 35 - 104 U/L    ALT 22 0 - 32 U/L    AST 15 0 - 31 U/L   Lipase   Result Value Ref Range    Lipase 17 13 - 60 U/L   Magnesium   Result Value Ref Range    Magnesium 2.0 1.6 - 2.6 mg/dL   Troponin Result Value Ref Range    Troponin, High Sensitivity 13 (H) 0 - 9 ng/L   D-dimer, quantitative   Result Value Ref Range    D-Dimer, Quant 284 ng/mL DDU   Troponin   Result Value Ref Range    Troponin, High Sensitivity 10 (H) 0 - 9 ng/L   EKG 12 Lead   Result Value Ref Range    Ventricular Rate 100 BPM    Atrial Rate 100 BPM    P-R Interval 128 ms    QRS Duration 90 ms    Q-T Interval 346 ms    QTc Calculation (Bazett) 446 ms    P Axis 73 degrees    R Axis 60 degrees    T Axis 55 degrees       ECG:  Please refer to workup tab for EKG read    Radiology:  CTA PULMONARY W CONTRAST   Final Result   1. Negative for pulmonary embolism. Borderline cardiomegaly and equivalent   central pulmonary vascular fullness. Unchanged atherosclerotic arterial   disease. 2.   Minimal interval worsening of findings compatible with multifocal   pneumonia, base etiology uncertain. Otherwise unchanged background   centrilobular emphysema/chronic interstitial lung disease. 3.  Rare, subcentimeter microcalcification is noted within the left breast,   nonspecific by this exam.      4.  Otherwise, no significant change. Please see above comments. .      RECOMMENDATIONS:   1. Impressions 1 - 2: Recommend follow-up thoracic imaging to resolution, as   directed clinically. 2.  Impression 3: Recommend mammographic correlation. Ghada Frias US ABDOMEN LIMITED   Final Result   1. Findings are compatible with diffuse hepatic steatosis. 2. Hypoechoic lesion at the pancreatic body, recommend CT or MRI   3. Gallbladder polyps, recommend six-month follow-up ultrasound   4.  No acute cholecystitis or biliary dilation         XR CHEST PORTABLE   Final Result   Obstructive airways disease.             ------------------------- NURSING NOTES AND VITALS REVIEWED ---------------------------  Date / Time Roomed:  11/25/2021  6:16 AM  ED Bed Assignment:  05/05    The nursing notes within the ED encounter and vital signs as below have been reviewed. /62   Pulse (!) 34   Temp 97.1 °F (36.2 °C) (Temporal)   Resp 15   Ht 5' 3\" (1.6 m)   Wt 210 lb (95.3 kg)   SpO2 95%   BMI 37.20 kg/m²   Oxygen Saturation Interpretation: normal      ------------------------------------------ PROGRESS NOTES ------------------------------------------    I have spoken with the patient and discussed todays results, in addition to providing specific details for the plan of care and counseling regarding the diagnosis and prognosis. Their questions are answered at this time and they are agreeable with the plan. I discussed at length with them reasons for immediate return here for re evaluation. They will followup with their PCP by calling their office tomorrow. --------------------------------- ADDITIONAL PROVIDER NOTES ---------------------------------  At this time the patient is without objective evidence of an acute process requiring hospitalization or inpatient management. They have remained hemodynamically stable throughout their entire ED visit and are stable for discharge with outpatient follow-up. The plan has been discussed in detail and they are aware of the specific conditions for emergent return, as well as the importance of follow-up. Discharge Medication List as of 11/25/2021 10:18 AM      START taking these medications    Details   doxycycline hyclate (VIBRA-TABS) 100 MG tablet Take 1 tablet by mouth 2 times daily for 10 days, Disp-20 tablet, R-0Print      cefdinir (OMNICEF) 300 MG capsule Take 1 capsule by mouth 2 times daily for 10 days, Disp-20 capsule, R-0Print             Diagnosis:  1. Pneumonia of both lungs due to infectious organism, unspecified part of lung    2. Pancreatic lesion    3. Hepatic steatosis        Disposition:  Patient's disposition: Discharge to home  Patient's condition is stable.         Salinas Loco MD  Resident  11/26/21 [id number]

## 2021-11-25 NOTE — TELEPHONE ENCOUNTER
Writer contacted Zaira Mathews ,ED provider to inform of 30 day readmission risk. ED provider informed writer admit or discharge has not been determined. Continue to follow-up.

## 2021-11-26 LAB
EKG ATRIAL RATE: 99 BPM
EKG P AXIS: 72 DEGREES
EKG P-R INTERVAL: 128 MS
EKG Q-T INTERVAL: 342 MS
EKG QRS DURATION: 86 MS
EKG QTC CALCULATION (BAZETT): 438 MS
EKG R AXIS: 54 DEGREES
EKG T AXIS: 48 DEGREES
EKG VENTRICULAR RATE: 99 BPM

## 2021-11-28 LAB
BLOOD CULTURE, ROUTINE: NORMAL
CULTURE, BLOOD 2: NORMAL

## 2022-01-27 ENCOUNTER — APPOINTMENT (OUTPATIENT)
Dept: CT IMAGING | Age: 67
End: 2022-01-27
Payer: MEDICARE

## 2022-01-27 ENCOUNTER — APPOINTMENT (OUTPATIENT)
Dept: GENERAL RADIOLOGY | Age: 67
End: 2022-01-27
Payer: MEDICARE

## 2022-01-27 ENCOUNTER — HOSPITAL ENCOUNTER (EMERGENCY)
Age: 67
Discharge: HOME | End: 2022-01-28
Attending: STUDENT IN AN ORGANIZED HEALTH CARE EDUCATION/TRAINING PROGRAM
Payer: MEDICARE

## 2022-01-27 DIAGNOSIS — J18.9 PNEUMONIA OF RIGHT LOWER LOBE DUE TO INFECTIOUS ORGANISM: Primary | ICD-10-CM

## 2022-01-27 LAB
ALBUMIN SERPL-MCNC: 3.8 G/DL (ref 3.5–5.2)
ALP BLD-CCNC: 91 U/L (ref 35–104)
ALT SERPL-CCNC: 13 U/L (ref 0–32)
ANION GAP SERPL CALCULATED.3IONS-SCNC: 6 MMOL/L (ref 7–16)
AST SERPL-CCNC: 17 U/L (ref 0–31)
BASOPHILS ABSOLUTE: 0.01 E9/L (ref 0–0.2)
BASOPHILS RELATIVE PERCENT: 0.2 % (ref 0–2)
BILIRUB SERPL-MCNC: 0.4 MG/DL (ref 0–1.2)
BUN BLDV-MCNC: 17 MG/DL (ref 6–23)
C-REACTIVE PROTEIN: 1.8 MG/DL (ref 0–0.4)
CALCIUM SERPL-MCNC: 9.4 MG/DL (ref 8.6–10.2)
CHLORIDE BLD-SCNC: 98 MMOL/L (ref 98–107)
CO2: 35 MMOL/L (ref 22–29)
CREAT SERPL-MCNC: 0.7 MG/DL (ref 0.5–1)
EOSINOPHILS ABSOLUTE: 0.09 E9/L (ref 0.05–0.5)
EOSINOPHILS RELATIVE PERCENT: 1.8 % (ref 0–6)
GFR AFRICAN AMERICAN: >60
GFR NON-AFRICAN AMERICAN: >60 ML/MIN/1.73
GLUCOSE BLD-MCNC: 137 MG/DL (ref 74–99)
HCT VFR BLD CALC: 41.1 % (ref 34–48)
HEMOGLOBIN: 12.7 G/DL (ref 11.5–15.5)
IMMATURE GRANULOCYTES #: 0.02 E9/L
IMMATURE GRANULOCYTES %: 0.4 % (ref 0–5)
LYMPHOCYTES ABSOLUTE: 1.46 E9/L (ref 1.5–4)
LYMPHOCYTES RELATIVE PERCENT: 29 % (ref 20–42)
MCH RBC QN AUTO: 30.7 PG (ref 26–35)
MCHC RBC AUTO-ENTMCNC: 30.9 % (ref 32–34.5)
MCV RBC AUTO: 99.3 FL (ref 80–99.9)
MONOCYTES ABSOLUTE: 0.33 E9/L (ref 0.1–0.95)
MONOCYTES RELATIVE PERCENT: 6.5 % (ref 2–12)
NEUTROPHILS ABSOLUTE: 3.13 E9/L (ref 1.8–7.3)
NEUTROPHILS RELATIVE PERCENT: 62.1 % (ref 43–80)
PDW BLD-RTO: 13.2 FL (ref 11.5–15)
PLATELET # BLD: 142 E9/L (ref 130–450)
PMV BLD AUTO: 11.5 FL (ref 7–12)
POTASSIUM REFLEX MAGNESIUM: 4.2 MMOL/L (ref 3.5–5)
PRO-BNP: 131 PG/ML (ref 0–125)
RBC # BLD: 4.14 E12/L (ref 3.5–5.5)
SARS-COV-2, NAAT: NOT DETECTED
SEDIMENTATION RATE, ERYTHROCYTE: 54 MM/HR (ref 0–20)
SODIUM BLD-SCNC: 139 MMOL/L (ref 132–146)
TOTAL PROTEIN: 7.4 G/DL (ref 6.4–8.3)
TROPONIN, HIGH SENSITIVITY: 9 NG/L (ref 0–9)
WBC # BLD: 5 E9/L (ref 4.5–11.5)

## 2022-01-27 PROCEDURE — 83880 ASSAY OF NATRIURETIC PEPTIDE: CPT

## 2022-01-27 PROCEDURE — 99284 EMERGENCY DEPT VISIT MOD MDM: CPT

## 2022-01-27 PROCEDURE — 6360000004 HC RX CONTRAST MEDICATION: Performed by: RADIOLOGY

## 2022-01-27 PROCEDURE — 71045 X-RAY EXAM CHEST 1 VIEW: CPT

## 2022-01-27 PROCEDURE — 87635 SARS-COV-2 COVID-19 AMP PRB: CPT

## 2022-01-27 PROCEDURE — 84484 ASSAY OF TROPONIN QUANT: CPT

## 2022-01-27 PROCEDURE — 71275 CT ANGIOGRAPHY CHEST: CPT

## 2022-01-27 PROCEDURE — 80053 COMPREHEN METABOLIC PANEL: CPT

## 2022-01-27 PROCEDURE — 86140 C-REACTIVE PROTEIN: CPT

## 2022-01-27 PROCEDURE — 85651 RBC SED RATE NONAUTOMATED: CPT

## 2022-01-27 PROCEDURE — 93005 ELECTROCARDIOGRAM TRACING: CPT | Performed by: PHYSICIAN ASSISTANT

## 2022-01-27 PROCEDURE — 85025 COMPLETE CBC W/AUTO DIFF WBC: CPT

## 2022-01-27 RX ORDER — DOXYCYCLINE HYCLATE 100 MG/1
100 CAPSULE ORAL ONCE
Status: COMPLETED | OUTPATIENT
Start: 2022-01-27 | End: 2022-01-28

## 2022-01-27 RX ORDER — DOXYCYCLINE HYCLATE 100 MG
100 TABLET ORAL 2 TIMES DAILY
Qty: 14 TABLET | Refills: 0 | Status: SHIPPED | OUTPATIENT
Start: 2022-01-27 | End: 2022-02-03

## 2022-01-27 RX ADMIN — IOPAMIDOL 75 ML: 755 INJECTION, SOLUTION INTRAVENOUS at 22:07

## 2022-01-27 ASSESSMENT — ENCOUNTER SYMPTOMS
ABDOMINAL DISTENTION: 0
NAUSEA: 0
EYE DISCHARGE: 0
SHORTNESS OF BREATH: 1
COUGH: 0
EYE PAIN: 0
EYE REDNESS: 0
SINUS PRESSURE: 0
DIARRHEA: 0
VOMITING: 0
WHEEZING: 0
SORE THROAT: 0
BACK PAIN: 0

## 2022-01-27 NOTE — ED NOTES
Department of Emergency Medicine    FIRST PROVIDER TRIAGE NOTE             Independent MLP           1/27/22  5:27 PM EST    Date of Encounter: 1/27/22   MRN: [id number]    Vitals:    01/27/22 1717   BP: (!) 178/86   Pulse: 107   Resp: 16   Temp: 97.6 °F (36.4 °C)   SpO2: 96%   Weight: 220 lb (99.8 kg)   Height: 5' 3\" (1.6 m)      HPI: Neil Waggoner is a 74 y.o. female who presents to the ED for Shortness of Breath (pt hypoxic on home O2 2L, pt has increased O2 to 4L, cough, dizziness, x3 days)     ROS: Negative for abd pain. Physical Exam:   Gen Appearance/Constitutional: alert  CV: tachycardia     Initial Plan of Care: All treatment areas with department are currently occupied. Plan to order/Initiate the following while awaiting opening in ED: labs, EKG and imaging studies.     Initial Plan of Care: Initiate Treatment-Testing, Proceed toTreatment Area When Bed Available for ED Attending/MLP to Continue Care    Electronically signed by Brigido Lee PA-C   DD: 1/27/22       Brigido Lee PA-C  01/27/22 5229

## 2022-01-28 VITALS
OXYGEN SATURATION: 96 % | SYSTOLIC BLOOD PRESSURE: 133 MMHG | HEIGHT: 63 IN | WEIGHT: 220 LBS | RESPIRATION RATE: 18 BRPM | BODY MASS INDEX: 38.98 KG/M2 | DIASTOLIC BLOOD PRESSURE: 65 MMHG | HEART RATE: 103 BPM | TEMPERATURE: 97.6 F

## 2022-01-28 LAB
EKG ATRIAL RATE: 91 BPM
EKG P AXIS: 62 DEGREES
EKG P-R INTERVAL: 142 MS
EKG Q-T INTERVAL: 378 MS
EKG QRS DURATION: 84 MS
EKG QTC CALCULATION (BAZETT): 464 MS
EKG R AXIS: 55 DEGREES
EKG T AXIS: 40 DEGREES
EKG VENTRICULAR RATE: 91 BPM

## 2022-01-28 PROCEDURE — 6370000000 HC RX 637 (ALT 250 FOR IP): Performed by: STUDENT IN AN ORGANIZED HEALTH CARE EDUCATION/TRAINING PROGRAM

## 2022-01-28 RX ADMIN — DOXYCYCLINE HYCLATE 100 MG: 100 CAPSULE ORAL at 00:06

## 2022-01-28 NOTE — ED NOTES
Pt ambulated on 4L O2 with steady gait pulse Ox reading 96%-97% . On returning to bed pulse Ox 95% .      [facility]Le[facility]  01/27/22 [id number]

## 2022-01-28 NOTE — ED NOTES
Pt ambulated on 2L NC Pulse Ox reading 92% .  On returning to bed pulse Ox 93% HR [address], [facility]  01/27/22 9867

## 2022-01-28 NOTE — ED PROVIDER NOTES
ED  Provider Note  Admit Date/RoomTime: 1/27/2022  6:54 PM  ED Room: 21/21     HPI:   Naveed Ventura is a 74 y.o. female presenting to the ED for shortness of breath , beginning hours ago. History comes primarily from the patient. Patient Active Problem List:     Essential hypertension     COPD exacerbation ([facility] [address]  . The complaint has been persistent, moderate in severity, improved by nothing and worsened by nothing. Associated symptoms include none and cough. Aurelia Roberto states that she wears 2 L continuously and has done so secondary to COPD for the last 5 years. Yesterday, while lifting something heavy, she excellently dropped a heavy object on the tubing for her supplemental oxygen. She noted that over the course the day she has become progressively more short of breath. They replaced her tubing and this did help somewhat, however she persisted in her shortness of breath over the course the last 48 hours and into today. She has reportedly had desaturations as low as the 70s with exertion and ambulation while at home. She denies any associated wheezing, fever, chills, night sweats, unintentional weight loss, however she has had to increase her supplemental oxygen from 2 L to 4 L to maintain her O2 saturation. Due to this newly worsening oxygen requirement, she presented to [facility] [place] emergency department for further evaluation and treatment      On arrival, the patient was assessed with history, physical exam, imaging studies, laboratory studies and ekg, vital signs. Vital signs were stable on arrival other than a mild hypertension of 489 systolic and a mild tachycardia 107 and the patient was afebrile. Review of Systems   Constitutional: Positive for activity change. Negative for chills and fever. HENT: Negative for ear pain, sinus pressure and sore throat. Eyes: Negative for pain, discharge and redness. Respiratory: Positive for shortness of breath.  Negative for cough and wheezing. Cardiovascular: Negative for chest pain. Gastrointestinal: Negative for abdominal distention, diarrhea, nausea and vomiting. Genitourinary: Negative for dysuria and frequency. Musculoskeletal: Negative for arthralgias and back pain. Skin: Negative for rash and wound. Neurological: Negative for weakness and headaches. Hematological: Negative for adenopathy. All other systems reviewed and are negative. Physical Exam  Vitals and nursing note reviewed. Constitutional:       Appearance: She is well-developed. She is obese. HENT:      Head: Normocephalic and atraumatic. Eyes:      Pupils: Pupils are equal, round, and reactive to light. Cardiovascular:      Rate and Rhythm: Normal rate and regular rhythm. Pulmonary:      Effort: Pulmonary effort is normal. No respiratory distress. Breath sounds: Normal breath sounds. No decreased breath sounds, wheezing or rales. Abdominal:      General: Bowel sounds are normal.      Palpations: Abdomen is soft. Tenderness: There is no abdominal tenderness. There is no guarding or rebound. Musculoskeletal:      Cervical back: Normal range of motion and neck supple. Skin:     General: Skin is warm and dry. Neurological:      Mental Status: She is alert and oriented to person, place, and time. Cranial Nerves: No cranial nerve deficit. Coordination: Coordination normal.          Procedures     MDM  Number of Diagnoses or Management Options  Hypoxia  Pneumonia of right lower lobe due to infectious organism  Diagnosis management comments: Emergency Department evaluation was notable for reassuring work-up in the setting of COPD. Patient's laboratory studies were notable for a negative Covid test, mildly elevated nonspecific inflammatory markers, balanced electrolytes, good renal function, normal troponin, negative Covid test, no significant leukocytosis, anemia or thrombocytopenia.   EKG was unremarkable and imaging studies revealed no evidence of acute pulmonary embolism. Patient did have a mild right lower lobe infiltrate that will be treated with antibiotics. She was ambulated both on 4 L and 2 L did not desaturate with this ambulation. She will be discharged with antibiotics and was advised to close follow-up with her primary care provider and to return should her symptoms worsen. They were advised to return to the emergency department should they develop fever, chills, night sweats, nausea, vomiting, diarrhea, chest pain, shortness of breath, or worsening of their symptoms despite treatment from this emergency department visit. They were instructed to follow-up with their primary care provider in 2 days. This information was relayed to the patient who understood this plan of care and was amenable to the plan.            --------------------------------------------- PAST HISTORY ---------------------------------------------  Past Medical History:  has a past medical history of COPD (chronic obstructive pulmonary disease) ([facility] [address] Hyperlipidemia, and Hypertension. Past Surgical History:  has a past surgical history that includes  section. Social History:  reports that she quit smoking about 10 years ago. Her smoking use included cigarettes. She started smoking about 51 years ago. She has a 31.00 pack-year smoking history. She has never used smokeless tobacco. She reports previous alcohol use. She reports that she does not use drugs. Family History: family history includes COPD in her father; Cancer in her mother. The patients home medications have been reviewed. Allergies: Patient has no known allergies.     -------------------------------------------------- RESULTS -------------------------------------------------  Labs:  Results for orders placed or performed during the hospital encounter of 22   COVID-19, Rapid    Specimen: Nasopharyngeal Swab   Result Value Ref Range SARS-CoV-2, NAAT Not Detected Not Detected   CBC Auto Differential   Result Value Ref Range    WBC 5.0 4.5 - 11.5 E9/L    RBC 4.14 3.50 - 5.50 E12/L    Hemoglobin 12.7 11.5 - 15.5 g/dL    Hematocrit 41.1 34.0 - 48.0 %    MCV 99.3 80.0 - 99.9 fL    MCH 30.7 26.0 - 35.0 pg    MCHC 30.9 (L) 32.0 - 34.5 %    RDW 13.2 11.5 - 15.0 fL    Platelets 717 185 - 712 E9/L    MPV 11.5 7.0 - 12.0 fL    Neutrophils % 62.1 43.0 - 80.0 %    Immature Granulocytes % 0.4 0.0 - 5.0 %    Lymphocytes % 29.0 20.0 - 42.0 %    Monocytes % 6.5 2.0 - 12.0 %    Eosinophils % 1.8 0.0 - 6.0 %    Basophils % 0.2 0.0 - 2.0 %    Neutrophils Absolute 3.13 1.80 - 7.30 E9/L    Immature Granulocytes # 0.02 E9/L    Lymphocytes Absolute 1.46 (L) 1.50 - 4.00 E9/L    Monocytes Absolute 0.33 0.10 - 0.95 E9/L    Eosinophils Absolute 0.09 0.05 - 0.50 E9/L    Basophils Absolute 0.01 0.00 - 0.20 E9/L   Comprehensive Metabolic Panel w/ Reflex to MG   Result Value Ref Range    Sodium 139 132 - 146 mmol/L    Potassium reflex Magnesium 4.2 3.5 - 5.0 mmol/L    Chloride 98 98 - 107 mmol/L    CO2 35 (H) 22 - 29 mmol/L    Anion Gap 6 (L) 7 - 16 mmol/L    Glucose 137 (H) 74 - 99 mg/dL    BUN 17 6 - 23 mg/dL    CREATININE 0.7 0.5 - 1.0 mg/dL    GFR Non-African American >60 >=60 mL/min/1.73    GFR African American >60     Calcium 9.4 8.6 - 10.2 mg/dL    Total Protein 7.4 6.4 - 8.3 g/dL    Albumin 3.8 3.5 - 5.2 g/dL    Total Bilirubin 0.4 0.0 - 1.2 mg/dL    Alkaline Phosphatase 91 35 - 104 U/L    ALT 13 0 - 32 U/L    AST 17 0 - 31 U/L   Troponin   Result Value Ref Range    Troponin, High Sensitivity 9 0 - 9 ng/L   Brain Natriuretic Peptide   Result Value Ref Range    Pro- (H) 0 - 125 pg/mL   Sedimentation Rate   Result Value Ref Range    Sed Rate 54 (H) 0 - 20 mm/Hr   C-reactive protein   Result Value Ref Range    CRP 1.8 (H) 0.0 - 0.4 mg/dL   EKG 12 Lead   Result Value Ref Range    Ventricular Rate 91 BPM    Atrial Rate 91 BPM    P-R Interval 142 ms    QRS Duration 84 ms    Q-T Interval 378 ms    QTc Calculation (Bazett) 464 ms    P Axis 62 degrees    R Axis 55 degrees    T Axis 40 degrees       Radiology:  CTA PULMONARY W CONTRAST   Final Result   No evidence of pulmonary embolism. Middle lobe and lingular ground-glass opacities, likely   infectious/inflammatory. XR CHEST PORTABLE   Final Result   No acute process. ------------------------- NURSING NOTES AND VITALS REVIEWED ---------------------------  Date / Time Roomed:  1/27/2022  6:54 PM  ED Bed Assignment:  21/21    The nursing notes within the ED encounter and vital signs as below have been reviewed. BP (!) 178/86   Pulse 107   Temp 97.6 °F (36.4 °C)   Resp 16   Ht 5' 3\" (1.6 m)   Wt 220 lb (99.8 kg)   SpO2 96%   BMI 38.97 kg/m²   Oxygen Saturation Interpretation: Normal      ------------------------------------------ PROGRESS NOTES ------------------------------------------  11:51 PM EST  I have spoken with the patient and discussed todays results, in addition to providing specific details for the plan of care and counseling regarding the diagnosis and prognosis. Their questions are answered at this time and they are agreeable with the plan. I discussed at length with them reasons for immediate return here for re evaluation. They will followup with their primary care physician by calling their office tomorrow. --------------------------------- ADDITIONAL PROVIDER NOTES ---------------------------------  At this time the patient is without objective evidence of an acute process requiring hospitalization or inpatient management. They have remained hemodynamically stable throughout their entire ED visit and are stable for discharge with outpatient follow-up. The plan has been discussed in detail and they are aware of the specific conditions for emergent return, as well as the importance of follow-up.       New Prescriptions    DOXYCYCLINE HYCLATE (VIBRA-TABS) 100 MG TABLET Take 1 tablet by mouth 2 times daily for 7 days       Diagnosis:  1. Pneumonia of right lower lobe due to infectious organism    2. Hypoxia        Disposition:  Patient's disposition: Discharge to home  Patient's condition is stable.             Zarina Leary 41., DO  Resident  01/27/22 [address] [place] [address]  Resident  01/27/22 1448

## 2022-04-21 ENCOUNTER — HOSPITAL ENCOUNTER (EMERGENCY)
Age: 67
Discharge: HOME | End: 2022-04-21
Attending: EMERGENCY MEDICINE
Payer: MEDICARE

## 2022-04-21 ENCOUNTER — APPOINTMENT (OUTPATIENT)
Dept: ULTRASOUND IMAGING | Age: 67
End: 2022-04-21
Payer: MEDICARE

## 2022-04-21 ENCOUNTER — APPOINTMENT (OUTPATIENT)
Dept: GENERAL RADIOLOGY | Age: 67
End: 2022-04-21
Payer: MEDICARE

## 2022-04-21 VITALS
TEMPERATURE: 97.2 F | DIASTOLIC BLOOD PRESSURE: 79 MMHG | WEIGHT: 201 LBS | RESPIRATION RATE: 16 BRPM | BODY MASS INDEX: 35.61 KG/M2 | HEART RATE: 95 BPM | HEIGHT: 63 IN | SYSTOLIC BLOOD PRESSURE: 130 MMHG | OXYGEN SATURATION: 93 %

## 2022-04-21 DIAGNOSIS — J44.1 COPD EXACERBATION (HCC): Primary | ICD-10-CM

## 2022-04-21 DIAGNOSIS — K82.4 POLYP OF GALLBLADDER: ICD-10-CM

## 2022-04-21 LAB
ALBUMIN SERPL-MCNC: 3.5 G/DL (ref 3.5–5.2)
ALP BLD-CCNC: 86 U/L (ref 35–104)
ALT SERPL-CCNC: 16 U/L (ref 0–32)
ANION GAP SERPL CALCULATED.3IONS-SCNC: 6 MMOL/L (ref 7–16)
AST SERPL-CCNC: 15 U/L (ref 0–31)
BASOPHILS ABSOLUTE: 0 E9/L (ref 0–0.2)
BASOPHILS RELATIVE PERCENT: 0 % (ref 0–2)
BILIRUB SERPL-MCNC: 0.3 MG/DL (ref 0–1.2)
BUN BLDV-MCNC: 14 MG/DL (ref 6–23)
CALCIUM SERPL-MCNC: 9.2 MG/DL (ref 8.6–10.2)
CHLORIDE BLD-SCNC: 96 MMOL/L (ref 98–107)
CO2: 35 MMOL/L (ref 22–29)
CREAT SERPL-MCNC: 0.8 MG/DL (ref 0.5–1)
EOSINOPHILS ABSOLUTE: 0.09 E9/L (ref 0.05–0.5)
EOSINOPHILS RELATIVE PERCENT: 1.1 % (ref 0–6)
GFR AFRICAN AMERICAN: >60
GFR NON-AFRICAN AMERICAN: >60 ML/MIN/1.73
GLUCOSE BLD-MCNC: 122 MG/DL (ref 74–99)
HCT VFR BLD CALC: 39.9 % (ref 34–48)
HEMOGLOBIN: 12.3 G/DL (ref 11.5–15.5)
IMMATURE GRANULOCYTES #: 0.03 E9/L
IMMATURE GRANULOCYTES %: 0.4 % (ref 0–5)
LACTIC ACID: 1.3 MMOL/L (ref 0.5–2.2)
LIPASE: 17 U/L (ref 13–60)
LYMPHOCYTES ABSOLUTE: 1.12 E9/L (ref 1.5–4)
LYMPHOCYTES RELATIVE PERCENT: 13.2 % (ref 20–42)
MCH RBC QN AUTO: 30.4 PG (ref 26–35)
MCHC RBC AUTO-ENTMCNC: 30.8 % (ref 32–34.5)
MCV RBC AUTO: 98.8 FL (ref 80–99.9)
MONOCYTES ABSOLUTE: 0.42 E9/L (ref 0.1–0.95)
MONOCYTES RELATIVE PERCENT: 5 % (ref 2–12)
NEUTROPHILS ABSOLUTE: 6.8 E9/L (ref 1.8–7.3)
NEUTROPHILS RELATIVE PERCENT: 80.3 % (ref 43–80)
PDW BLD-RTO: 13.4 FL (ref 11.5–15)
PLATELET # BLD: 149 E9/L (ref 130–450)
PMV BLD AUTO: 11.4 FL (ref 7–12)
POTASSIUM REFLEX MAGNESIUM: 4.5 MMOL/L (ref 3.5–5)
PRO-BNP: 201 PG/ML (ref 0–125)
RBC # BLD: 4.04 E12/L (ref 3.5–5.5)
SODIUM BLD-SCNC: 137 MMOL/L (ref 132–146)
TOTAL PROTEIN: 7.2 G/DL (ref 6.4–8.3)
TROPONIN, HIGH SENSITIVITY: 10 NG/L (ref 0–9)
TROPONIN, HIGH SENSITIVITY: 10 NG/L (ref 0–9)
WBC # BLD: 8.5 E9/L (ref 4.5–11.5)

## 2022-04-21 PROCEDURE — 76705 ECHO EXAM OF ABDOMEN: CPT

## 2022-04-21 PROCEDURE — 99285 EMERGENCY DEPT VISIT HI MDM: CPT

## 2022-04-21 PROCEDURE — 94664 DEMO&/EVAL PT USE INHALER: CPT

## 2022-04-21 PROCEDURE — 83880 ASSAY OF NATRIURETIC PEPTIDE: CPT

## 2022-04-21 PROCEDURE — 36415 COLL VENOUS BLD VENIPUNCTURE: CPT

## 2022-04-21 PROCEDURE — 84484 ASSAY OF TROPONIN QUANT: CPT

## 2022-04-21 PROCEDURE — 6370000000 HC RX 637 (ALT 250 FOR IP): Performed by: STUDENT IN AN ORGANIZED HEALTH CARE EDUCATION/TRAINING PROGRAM

## 2022-04-21 PROCEDURE — 6360000002 HC RX W HCPCS: Performed by: STUDENT IN AN ORGANIZED HEALTH CARE EDUCATION/TRAINING PROGRAM

## 2022-04-21 PROCEDURE — 93005 ELECTROCARDIOGRAM TRACING: CPT | Performed by: PHYSICIAN ASSISTANT

## 2022-04-21 PROCEDURE — 80053 COMPREHEN METABOLIC PANEL: CPT

## 2022-04-21 PROCEDURE — 83690 ASSAY OF LIPASE: CPT

## 2022-04-21 PROCEDURE — 96374 THER/PROPH/DIAG INJ IV PUSH: CPT

## 2022-04-21 PROCEDURE — 85025 COMPLETE CBC W/AUTO DIFF WBC: CPT

## 2022-04-21 PROCEDURE — 71045 X-RAY EXAM CHEST 1 VIEW: CPT

## 2022-04-21 PROCEDURE — 94640 AIRWAY INHALATION TREATMENT: CPT

## 2022-04-21 PROCEDURE — 83605 ASSAY OF LACTIC ACID: CPT

## 2022-04-21 RX ORDER — IPRATROPIUM BROMIDE AND ALBUTEROL SULFATE 2.5; .5 MG/3ML; MG/3ML
3 SOLUTION RESPIRATORY (INHALATION) ONCE
Status: COMPLETED | OUTPATIENT
Start: 2022-04-21 | End: 2022-04-21

## 2022-04-21 RX ORDER — METHYLPREDNISOLONE SODIUM SUCCINATE 125 MG/2ML
60 INJECTION, POWDER, LYOPHILIZED, FOR SOLUTION INTRAMUSCULAR; INTRAVENOUS ONCE
Status: COMPLETED | OUTPATIENT
Start: 2022-04-21 | End: 2022-04-21

## 2022-04-21 RX ORDER — PREDNISONE 50 MG/1
50 TABLET ORAL DAILY
Qty: 5 TABLET | Refills: 0 | Status: SHIPPED | OUTPATIENT
Start: 2022-04-21 | End: 2022-04-26

## 2022-04-21 RX ADMIN — IPRATROPIUM BROMIDE AND ALBUTEROL SULFATE 3 AMPULE: .5; 3 SOLUTION RESPIRATORY (INHALATION) at 15:47

## 2022-04-21 RX ADMIN — METHYLPREDNISOLONE SODIUM SUCCINATE 60 MG: 125 INJECTION, POWDER, FOR SOLUTION INTRAMUSCULAR; INTRAVENOUS at 15:40

## 2022-04-21 ASSESSMENT — PAIN - FUNCTIONAL ASSESSMENT: PAIN_FUNCTIONAL_ASSESSMENT: NONE - DENIES PAIN

## 2022-04-21 NOTE — ED NOTES
Department of Emergency Medicine    FIRST PROVIDER TRIAGE NOTE             Independent MLP           4/21/22  1:41 PM EDT    Date of Encounter: 4/21/22   MRN: [id number]    Vitals:    04/21/22 1343   BP: 137/63   Pulse: 102   Resp: 16   Temp: 97.2 °F (36.2 °C)   TempSrc: Temporal   SpO2: 94%   Weight: 201 lb (91.2 kg)   Height: 5' 3\" (1.6 m)      HPI: Marisel Palomino is a 74 y.o. female who presents to the ED for Abdominal Pain and Shortness of Breath (wears 2L NC typically )  89% at triage  Oxygen increased to 4L and now 94%     ROS: Negative for vomiting or diarrhea. Physical Exam:   Gen Appearance/Constitutional: alert  CV: tachycardia  Pulm: diminished   GI: tender to palpation     Initial Plan of Care: All treatment areas with department are currently occupied. Plan to order/Initiate the following while awaiting opening in ED: labs, EKG, imaging studies and ultrasound.     Initial Plan of Care: Initiate Treatment-Testing, Proceed toTreatment Area When Bed Available for ED Attending/MLP to Continue Care    Electronically signed by Lane Alpers, PA-C   DD: 4/21/22       Lane Alpers, PA-C  04/21/22 [id number]

## 2022-04-21 NOTE — ED PROVIDER NOTES
Department of Emergency Medicine   ED Provider Note  Admit Date/RoomTime: 2022  2:38 PM  ED Room:           History of Present Illness:  22, Time: 2:57 PM EDT         Troy Molina is a 74 y.o. female w/ hx of COPD (on 2 liters of o2 via NC chronically) presenting to the ED for shortness of breath, beginning this morning. The complaint has been persistent, moderate in severity, and worsened by moderate exertion. She has not any chest pain, has a dry cough, occasionally productive of sputum but no different than her baseline. She was 89% SPO2 on her normal 2 L in triage, was increased to 4 L. Denies any leg pain or swelling, denies any chills, did have a subjective fever 2 days ago. She also has had intermittent postprandial right upper quadrant abdominal pain radiating to the right flank region. She currently does not have any abdominal pain. This pain is only present after eating, she has been eating smaller meals and less than usual and it has not been bothering her. She has had similar pain in the past but has worked through it, has not seen a general surgeon before. She denies any emesis or nausea, denies any constipation or diarrhea. She denies any urinary symptoms including dysuria urgency or frequency. She denies any lightheadedness or syncope, denies any congestion or sore throat. Review of Systems:      Pertinent positives and negatives are stated within HPI. 10 point ROS otherwise negative.      --------------------------------------------- PAST HISTORY ---------------------------------------------  Past Medical History:  has a past medical history of COPD (chronic obstructive pulmonary disease) ([address] Hyperlipidemia, and Hypertension. Past Surgical History:  has a past surgical history that includes  section. Social History:  reports that she quit smoking about 10 years ago. Her smoking use included cigarettes. She started smoking about 51 years ago.  She has a 31.00 pack-year smoking history. She has never used smokeless tobacco. She reports previous alcohol use. She reports that she does not use drugs. Family History: family history includes COPD in her father; Cancer in her mother. The patients home medications have been reviewed. Allergies: Patient has no known allergies. ---------------------------------------------------PHYSICAL EXAM--------------------------------------    Constitutional/General: AAO to person/place/time/purpose, NAD, no labored breathing  Head: Normocephalic and atraumatic  Eyes: EOMI, conjunctiva normal, sclera non icteric  Mouth: Moist mucous membranes, uvula midline  Neck: Supple, no stridor, no meningeal signs  Respiratory: On 4 L of O2 via nasal cannula. Not in respiratory distress. Severely diminished breath sounds bilaterally, scattered Rales and expiratory wheezes. Cardiovascular:  Mild tachycardia. Regular rhythm. No murmurs, no gallops, or rubs. 2+ distal pulses. Equal extremity pulses. Chest: No chest wall tenderness or deformity  GI:  Abdomen Soft, Non tender, Non distended. No rebound, guarding, or rigidity. No pulsatile masses. Negative Schaefer's sign. Musculoskeletal: Moves all extremities x 4. Warm and well perfused, no clubbing, cyanosis, or edema. Capillary refill <3 seconds  Integument: skin warm and dry. No rashes. Neurologic: GCS 15, no focal deficits, symmetric strength 5/5 in the major muscle groups of upper and lower extremities bilaterally  Psychiatric: Normal Affect      -----------------------------------------PROCEDURES----------------------------------------------------      -------------------------------------------------- RESULTS -------------------------------------------------  I have personally reviewed all laboratory and imaging results for this patient. Results are listed below.      LABS:  Results for orders placed or performed during the hospital encounter of 04/21/22   CBC with Final Result   No acute pulmonary process         US GALLBLADDER RUQ   Final Result   1. There are multiple gallbladder polyps which appear smooth bordered. The   largest measures 8 mm. The polyps appear similar to the patient's prior   study of 11/25/2021. Follow-up gallbladder ultrasound is recommended in 12   months. 2. There is no evidence of choledocholithiasis. 3. Hepatic steatosis. EKG:      See ED Course for EKG documentation        ------------------------- NURSING NOTES AND VITALS REVIEWED ---------------------------   The nursing notes within the ED encounter and vital signs as below have been reviewed by myself. /79   Pulse 95   Temp 97.2 °F (36.2 °C) (Temporal)   Resp 16   Ht 5' 3\" (1.6 m)   Wt 201 lb (91.2 kg)   SpO2 93%   BMI 35.61 kg/m²   Oxygen Saturation Interpretation: Abnormal - but at baseline    The patients available past medical records and past encounters were reviewed. ------------------------------ ED COURSE/MEDICAL DECISION MAKING----------------------  Medications   ipratropium-albuterol (DUONEB) nebulizer solution 3 ampule (3 ampules Inhalation Given 4/21/22 1547)   methylPREDNISolone sodium (SOLU-MEDROL) injection 60 mg (60 mg IntraVENous Given 4/21/22 1540)            Medical Decision Making: This is a 75 yo female presenting to the emergency department for shortness of breath, postprandial abdominal pain. Patient with no active abdominal pain, benign abdominal exam.  Patient with diminished air movement on exam, initially required mild increase in her baseline oxygen from 2 to 4 L. Patient was given DuoNeb's, Solu-Medrol, able to be decreased down to her normal oxygen without any difficulty breathing or hypoxia. Patient was observed for significant mount of time, still maintained O2 sats on her home O2. Troponin low, 10, stable on repeat. No acute ischemic changes on EKG.  ultrasound with evidence of gallbladder polyps, no evidence of acute cholecystitis or biliary obstruction. Suspect patient's intermittent abdominal pain related to intermittent obstruction related to these polyps. Patient will be given follow-up with general surgery, was given strict return precautions including worsening pain or fever or vomiting. Patient with no lactic acidosis, no leukocytosis. No transaminitis. Suspect dyspnea related to COPD exacerbation, patient will be placed on course of prednisone. See ED COURSE for additional MDM. Re-Evaluations:             ED Course as of 04/22/22 0009   u Apr 21, 2022   1536 EKG: This EKG is signed and interpreted by me. Rate: 85  Rhythm: Sinus  Interpretation: no acute changes  Comparison: stable as compared to patient's most recent EKG   [RH]   2973 Patient reexamined, she is satting 93% on 2 L of O2 via nasal cannula. She is not in any respiratory distress, is mildly tachycardic at this time because of recent breathing treatment, states she feels somewhat jittery from this. She does have much clear lungs on exam at this time, good air movement. [RH]      ED Course User Index  [RH] [address],          This patient's ED course included: a personal history and physicial examination, re-evaluation prior to disposition, multiple bedside re-evaluations, IV medications, cardiac monitoring and continuous pulse oximetry    This patient has remained hemodynamically stable during their ED course. Consultations:  See ED Course    Counseling: The emergency provider has spoken with the patient and spouse/SO and discussed todays results, in addition to providing specific details for the plan of care and counseling regarding the diagnosis and prognosis. Questions are answered at this time and they are agreeable with the plan.       --------------------------------- IMPRESSION AND DISPOSITION ---------------------------------    IMPRESSION  1. COPD exacerbation ([address]    2.  Polyp of gallbladder        DISPOSITION  Disposition: Discharge to home  Patient condition is stable      NOTE: This report was transcribed using voice recognition software. Every effort was made to ensure accuracy; however, inadvertent computerized transcription errors may be present. Also please note that patient was seen and examined by attending physician. Plan of care and disposition discussed with attending physician and they were immediately available or present for all procedures performed.        -- Frida Borges D.O. PGY-2     Resident Physician     Emergency Medicine      4/22/2022 12:09 AM        [address] DO  Resident  04/22/22 7420

## 2022-04-22 LAB
EKG ATRIAL RATE: 85 BPM
EKG P AXIS: 60 DEGREES
EKG P-R INTERVAL: 136 MS
EKG Q-T INTERVAL: 382 MS
EKG QRS DURATION: 90 MS
EKG QTC CALCULATION (BAZETT): 454 MS
EKG R AXIS: 26 DEGREES
EKG T AXIS: 55 DEGREES
EKG VENTRICULAR RATE: 85 BPM

## 2022-04-22 PROCEDURE — 93010 ELECTROCARDIOGRAM REPORT: CPT | Performed by: INTERNAL MEDICINE

## 2023-01-05 ENCOUNTER — APPOINTMENT (OUTPATIENT)
Dept: GENERAL RADIOLOGY | Age: 68
DRG: 193 | End: 2023-01-05
Payer: MEDICARE

## 2023-01-05 ENCOUNTER — HOSPITAL ENCOUNTER (INPATIENT)
Age: 68
LOS: 5 days | Discharge: HOME | DRG: 193 | End: 2023-01-10
Attending: EMERGENCY MEDICINE | Admitting: INTERNAL MEDICINE
Payer: MEDICARE

## 2023-01-05 ENCOUNTER — APPOINTMENT (OUTPATIENT)
Dept: CT IMAGING | Age: 68
DRG: 193 | End: 2023-01-05
Payer: MEDICARE

## 2023-01-05 DIAGNOSIS — J44.1 COPD EXACERBATION (HCC): Primary | ICD-10-CM

## 2023-01-05 DIAGNOSIS — J96.01 ACUTE RESPIRATORY FAILURE WITH HYPOXIA (HCC): ICD-10-CM

## 2023-01-05 DIAGNOSIS — J18.9 PNEUMONIA OF RIGHT LOWER LOBE DUE TO INFECTIOUS ORGANISM: ICD-10-CM

## 2023-01-05 LAB
ALBUMIN SERPL-MCNC: 3.6 G/DL (ref 3.5–5.2)
ALP BLD-CCNC: 89 U/L (ref 35–104)
ALT SERPL-CCNC: 25 U/L (ref 0–32)
ANION GAP SERPL CALCULATED.3IONS-SCNC: 9 MMOL/L (ref 7–16)
AST SERPL-CCNC: 11 U/L (ref 0–31)
B.E.: 6.7 MMOL/L (ref -3–3)
BASOPHILS ABSOLUTE: 0.01 E9/L (ref 0–0.2)
BASOPHILS RELATIVE PERCENT: 0.1 % (ref 0–2)
BILIRUB SERPL-MCNC: 0.3 MG/DL (ref 0–1.2)
BUN BLDV-MCNC: 25 MG/DL (ref 6–23)
CALCIUM SERPL-MCNC: 9.1 MG/DL (ref 8.6–10.2)
CHLORIDE BLD-SCNC: 94 MMOL/L (ref 98–107)
CO2: 34 MMOL/L (ref 22–29)
COHB: 1.7 % (ref 0–1.5)
CREAT SERPL-MCNC: 0.7 MG/DL (ref 0.5–1)
CRITICAL: ABNORMAL
DATE ANALYZED: ABNORMAL
DATE OF COLLECTION: ABNORMAL
EOSINOPHILS ABSOLUTE: 0 E9/L (ref 0.05–0.5)
EOSINOPHILS RELATIVE PERCENT: 0 % (ref 0–6)
GFR SERPL CREATININE-BSD FRML MDRD: >60 ML/MIN/1.73
GLUCOSE BLD-MCNC: 370 MG/DL (ref 74–99)
HCO3: 33.5 MMOL/L (ref 22–26)
HCT VFR BLD CALC: 41.3 % (ref 34–48)
HEMOGLOBIN: 12.3 G/DL (ref 11.5–15.5)
HHB: 5.4 % (ref 0–5)
IMMATURE GRANULOCYTES #: 0.07 E9/L
IMMATURE GRANULOCYTES %: 0.6 % (ref 0–5)
INFLUENZA A BY PCR: NOT DETECTED
INFLUENZA B BY PCR: NOT DETECTED
LAB: ABNORMAL
LYMPHOCYTES ABSOLUTE: 0.69 E9/L (ref 1.5–4)
LYMPHOCYTES RELATIVE PERCENT: 6.1 % (ref 20–42)
Lab: ABNORMAL
MAGNESIUM: 1.8 MG/DL (ref 1.6–2.6)
MCH RBC QN AUTO: 31 PG (ref 26–35)
MCHC RBC AUTO-ENTMCNC: 29.8 % (ref 32–34.5)
MCV RBC AUTO: 104 FL (ref 80–99.9)
METHB: 0.3 % (ref 0–1.5)
MODE: ABNORMAL
MONOCYTES ABSOLUTE: 0.19 E9/L (ref 0.1–0.95)
MONOCYTES RELATIVE PERCENT: 1.7 % (ref 2–12)
NEUTROPHILS ABSOLUTE: 10.29 E9/L (ref 1.8–7.3)
NEUTROPHILS RELATIVE PERCENT: 91.5 % (ref 43–80)
O2 CONTENT: 16.8 ML/DL
O2 SATURATION: 94.5 % (ref 92–98.5)
O2HB: 92.6 % (ref 94–97)
OPERATOR ID: 166
PATIENT TEMP: 37 C
PCO2: 58 MMHG (ref 35–45)
PDW BLD-RTO: 14.2 FL (ref 11.5–15)
PH BLOOD GAS: 7.38 (ref 7.35–7.45)
PLATELET # BLD: 201 E9/L (ref 130–450)
PMV BLD AUTO: 11 FL (ref 7–12)
PO2: 74.8 MMHG (ref 75–100)
POTASSIUM SERPL-SCNC: 4.4 MMOL/L (ref 3.5–5)
PRO-BNP: 329 PG/ML (ref 0–125)
RBC # BLD: 3.97 E12/L (ref 3.5–5.5)
SARS-COV-2, NAAT: NOT DETECTED
SODIUM BLD-SCNC: 137 MMOL/L (ref 132–146)
SOURCE, BLOOD GAS: ABNORMAL
THB: 12.9 G/DL (ref 11.5–16.5)
TIME ANALYZED: 1900
TOTAL PROTEIN: 7.4 G/DL (ref 6.4–8.3)
TROPONIN, HIGH SENSITIVITY: 10 NG/L (ref 0–9)
TROPONIN, HIGH SENSITIVITY: 9 NG/L (ref 0–9)
WBC # BLD: 11.3 E9/L (ref 4.5–11.5)

## 2023-01-05 PROCEDURE — 71275 CT ANGIOGRAPHY CHEST: CPT

## 2023-01-05 PROCEDURE — 93005 ELECTROCARDIOGRAM TRACING: CPT | Performed by: EMERGENCY MEDICINE

## 2023-01-05 PROCEDURE — 94640 AIRWAY INHALATION TREATMENT: CPT

## 2023-01-05 PROCEDURE — 83880 ASSAY OF NATRIURETIC PEPTIDE: CPT

## 2023-01-05 PROCEDURE — 87635 SARS-COV-2 COVID-19 AMP PRB: CPT

## 2023-01-05 PROCEDURE — 71046 X-RAY EXAM CHEST 2 VIEWS: CPT

## 2023-01-05 PROCEDURE — 2580000003 HC RX 258: Performed by: EMERGENCY MEDICINE

## 2023-01-05 PROCEDURE — 82805 BLOOD GASES W/O2 SATURATION: CPT

## 2023-01-05 PROCEDURE — 99285 EMERGENCY DEPT VISIT HI MDM: CPT

## 2023-01-05 PROCEDURE — 83735 ASSAY OF MAGNESIUM: CPT

## 2023-01-05 PROCEDURE — 85025 COMPLETE CBC W/AUTO DIFF WBC: CPT

## 2023-01-05 PROCEDURE — 96374 THER/PROPH/DIAG INJ IV PUSH: CPT

## 2023-01-05 PROCEDURE — 6360000002 HC RX W HCPCS: Performed by: EMERGENCY MEDICINE

## 2023-01-05 PROCEDURE — 80053 COMPREHEN METABOLIC PANEL: CPT

## 2023-01-05 PROCEDURE — 87502 INFLUENZA DNA AMP PROBE: CPT

## 2023-01-05 PROCEDURE — APPSS45 APP SPLIT SHARED TIME 31-45 MINUTES: Performed by: NURSE PRACTITIONER

## 2023-01-05 PROCEDURE — 2500000003 HC RX 250 WO HCPCS: Performed by: EMERGENCY MEDICINE

## 2023-01-05 PROCEDURE — 2060000000 HC ICU INTERMEDIATE R&B

## 2023-01-05 PROCEDURE — 84484 ASSAY OF TROPONIN QUANT: CPT

## 2023-01-05 PROCEDURE — 6360000004 HC RX CONTRAST MEDICATION: Performed by: RADIOLOGY

## 2023-01-05 PROCEDURE — 6370000000 HC RX 637 (ALT 250 FOR IP): Performed by: EMERGENCY MEDICINE

## 2023-01-05 RX ORDER — BENZONATATE 100 MG/1
200 CAPSULE ORAL 3 TIMES DAILY PRN
COMMUNITY

## 2023-01-05 RX ORDER — SODIUM CHLORIDE 0.9 % (FLUSH) 0.9 %
5-40 SYRINGE (ML) INJECTION PRN
Status: DISCONTINUED | OUTPATIENT
Start: 2023-01-05 | End: 2023-01-10 | Stop reason: HOSPADM

## 2023-01-05 RX ORDER — METHYLPREDNISOLONE SODIUM SUCCINATE 125 MG/2ML
80 INJECTION, POWDER, LYOPHILIZED, FOR SOLUTION INTRAMUSCULAR; INTRAVENOUS ONCE
Status: COMPLETED | OUTPATIENT
Start: 2023-01-05 | End: 2023-01-05

## 2023-01-05 RX ORDER — METHYLPREDNISOLONE SODIUM SUCCINATE 40 MG/ML
40 INJECTION, POWDER, LYOPHILIZED, FOR SOLUTION INTRAMUSCULAR; INTRAVENOUS DAILY
Status: DISCONTINUED | OUTPATIENT
Start: 2023-01-06 | End: 2023-01-06

## 2023-01-05 RX ORDER — PREDNISONE 2.5 MG
2.5 TABLET ORAL DAILY
Status: ON HOLD | COMMUNITY
End: 2023-01-10 | Stop reason: SDUPTHER

## 2023-01-05 RX ORDER — IPRATROPIUM BROMIDE AND ALBUTEROL SULFATE 2.5; .5 MG/3ML; MG/3ML
1 SOLUTION RESPIRATORY (INHALATION)
Status: DISCONTINUED | OUTPATIENT
Start: 2023-01-06 | End: 2023-01-10 | Stop reason: HOSPADM

## 2023-01-05 RX ORDER — INSULIN LISPRO 100 [IU]/ML
0-4 INJECTION, SOLUTION INTRAVENOUS; SUBCUTANEOUS NIGHTLY
Status: DISCONTINUED | OUTPATIENT
Start: 2023-01-05 | End: 2023-01-06

## 2023-01-05 RX ORDER — ACETAMINOPHEN 325 MG/1
650 TABLET ORAL EVERY 6 HOURS PRN
Status: DISCONTINUED | OUTPATIENT
Start: 2023-01-05 | End: 2023-01-10 | Stop reason: HOSPADM

## 2023-01-05 RX ORDER — ATORVASTATIN CALCIUM 20 MG/1
20 TABLET, FILM COATED ORAL NIGHTLY
Status: DISCONTINUED | OUTPATIENT
Start: 2023-01-05 | End: 2023-01-06

## 2023-01-05 RX ORDER — SODIUM CHLORIDE 9 MG/ML
INJECTION, SOLUTION INTRAVENOUS PRN
Status: DISCONTINUED | OUTPATIENT
Start: 2023-01-05 | End: 2023-01-10 | Stop reason: HOSPADM

## 2023-01-05 RX ORDER — SIMVASTATIN 20 MG
20 TABLET ORAL NIGHTLY
COMMUNITY

## 2023-01-05 RX ORDER — ENOXAPARIN SODIUM 100 MG/ML
40 INJECTION SUBCUTANEOUS DAILY
Status: DISCONTINUED | OUTPATIENT
Start: 2023-01-06 | End: 2023-01-10 | Stop reason: HOSPADM

## 2023-01-05 RX ORDER — ONDANSETRON 2 MG/ML
4 INJECTION INTRAMUSCULAR; INTRAVENOUS EVERY 6 HOURS PRN
Status: DISCONTINUED | OUTPATIENT
Start: 2023-01-05 | End: 2023-01-10 | Stop reason: HOSPADM

## 2023-01-05 RX ORDER — LISINOPRIL 10 MG/1
20 TABLET ORAL DAILY
Status: DISCONTINUED | OUTPATIENT
Start: 2023-01-06 | End: 2023-01-10 | Stop reason: HOSPADM

## 2023-01-05 RX ORDER — SODIUM CHLORIDE 0.9 % (FLUSH) 0.9 %
5-40 SYRINGE (ML) INJECTION EVERY 12 HOURS SCHEDULED
Status: DISCONTINUED | OUTPATIENT
Start: 2023-01-05 | End: 2023-01-10 | Stop reason: HOSPADM

## 2023-01-05 RX ORDER — PANTOPRAZOLE SODIUM 40 MG/1
40 TABLET, DELAYED RELEASE ORAL
Status: DISCONTINUED | OUTPATIENT
Start: 2023-01-06 | End: 2023-01-10 | Stop reason: HOSPADM

## 2023-01-05 RX ORDER — FLUTICASONE FUROATE, UMECLIDINIUM BROMIDE AND VILANTEROL TRIFENATATE 200; 62.5; 25 UG/1; UG/1; UG/1
1 POWDER RESPIRATORY (INHALATION) DAILY
COMMUNITY

## 2023-01-05 RX ORDER — DEXTROSE MONOHYDRATE 100 MG/ML
INJECTION, SOLUTION INTRAVENOUS CONTINUOUS PRN
Status: DISCONTINUED | OUTPATIENT
Start: 2023-01-05 | End: 2023-01-10 | Stop reason: HOSPADM

## 2023-01-05 RX ORDER — AMLODIPINE BESYLATE 5 MG/1
5 TABLET ORAL DAILY
Status: DISCONTINUED | OUTPATIENT
Start: 2023-01-06 | End: 2023-01-10 | Stop reason: HOSPADM

## 2023-01-05 RX ORDER — ACETAMINOPHEN 650 MG/1
650 SUPPOSITORY RECTAL EVERY 6 HOURS PRN
Status: DISCONTINUED | OUTPATIENT
Start: 2023-01-05 | End: 2023-01-10 | Stop reason: HOSPADM

## 2023-01-05 RX ORDER — INSULIN LISPRO 100 [IU]/ML
0-4 INJECTION, SOLUTION INTRAVENOUS; SUBCUTANEOUS
Status: DISCONTINUED | OUTPATIENT
Start: 2023-01-06 | End: 2023-01-06

## 2023-01-05 RX ORDER — POLYETHYLENE GLYCOL 3350 17 G/17G
17 POWDER, FOR SOLUTION ORAL DAILY PRN
Status: DISCONTINUED | OUTPATIENT
Start: 2023-01-05 | End: 2023-01-10 | Stop reason: HOSPADM

## 2023-01-05 RX ORDER — DOXYCYCLINE HYCLATE 100 MG/1
100 CAPSULE ORAL EVERY 12 HOURS SCHEDULED
Status: DISCONTINUED | OUTPATIENT
Start: 2023-01-06 | End: 2023-01-07

## 2023-01-05 RX ORDER — ONDANSETRON 4 MG/1
4 TABLET, ORALLY DISINTEGRATING ORAL EVERY 8 HOURS PRN
Status: DISCONTINUED | OUTPATIENT
Start: 2023-01-05 | End: 2023-01-10 | Stop reason: HOSPADM

## 2023-01-05 RX ORDER — LORATADINE 10 MG/1
10 CAPSULE, LIQUID FILLED ORAL DAILY
COMMUNITY

## 2023-01-05 RX ORDER — IPRATROPIUM BROMIDE AND ALBUTEROL SULFATE 2.5; .5 MG/3ML; MG/3ML
3 SOLUTION RESPIRATORY (INHALATION) ONCE
Status: COMPLETED | OUTPATIENT
Start: 2023-01-05 | End: 2023-01-05

## 2023-01-05 RX ADMIN — METHYLPREDNISOLONE SODIUM SUCCINATE 80 MG: 125 INJECTION, POWDER, FOR SOLUTION INTRAMUSCULAR; INTRAVENOUS at 17:56

## 2023-01-05 RX ADMIN — DOXYCYCLINE 100 MG: 100 INJECTION, POWDER, LYOPHILIZED, FOR SOLUTION INTRAVENOUS at 21:38

## 2023-01-05 RX ADMIN — IPRATROPIUM BROMIDE AND ALBUTEROL SULFATE 3 AMPULE: .5; 2.5 SOLUTION RESPIRATORY (INHALATION) at 18:17

## 2023-01-05 RX ADMIN — WATER 1000 MG: 1 INJECTION INTRAMUSCULAR; INTRAVENOUS; SUBCUTANEOUS at 21:37

## 2023-01-05 RX ADMIN — IOPAMIDOL 75 ML: 755 INJECTION, SOLUTION INTRAVENOUS at 18:04

## 2023-01-05 ASSESSMENT — PAIN - FUNCTIONAL ASSESSMENT
PAIN_FUNCTIONAL_ASSESSMENT: NONE - DENIES PAIN
PAIN_FUNCTIONAL_ASSESSMENT: NONE - DENIES PAIN

## 2023-01-05 ASSESSMENT — LIFESTYLE VARIABLES: HOW OFTEN DO YOU HAVE A DRINK CONTAINING ALCOHOL: NEVER

## 2023-01-05 NOTE — ED NOTES
Department of Emergency Medicine  FIRST PROVIDER TRIAGE NOTE             Independent MLP           1/5/23  12:42 PM EST    Date of Encounter: 1/5/23   MRN: [id number]      HPI: Shital Alvarado is a 69 y.o. female who presents to the ED for Shortness of Breath (Hx copd wears 3L normally, 85% in triage turned to 4L)     Patient is a 25-year-old presenting with shortness of breath. Patient chronically wears 2 L of oxygen. Patient has been normally now wearing over the last 2 weeks 4 L of oxygen. Patient's been seeing her family doctor and has had to increase it. They have been \"doctoring without relief. \"  Patient was 85% on room air and moved to 4 L now sat 95%    ROS: Negative for cough, vomiting, or diarrhea. PE: Gen Appearance/Constitutional: alert  HEENT: NC/NT. PERRLA,  Airway patent. Neck: supple     Initial Plan of Care: All treatment areas with department are currently occupied. Plan to order/Initiate the following while awaiting opening in ED: labs, EKG, and imaging studies.   Initiate Treatment-Testing, Proceed toTreatment Area When Bed Available for ED Attending/MLP to Continue Care    Electronically signed by Silvano Ruth PA-C   DD: 1/5/23       Silvano Ruth PA-C  01/05/23 2060

## 2023-01-05 NOTE — ED PROVIDER NOTES
[address]        Pt Name: Nicholas Cruz  MRN: [id number]  [place] 1955  Date of evaluation: 1/5/2023  Provider: Mauricio Vega MD  PCP: Kristy Thomas MD  Note Started: 5:30 PM EST 1/5/23    CHIEF COMPLAINT       Chief Complaint   Patient presents with    Shortness of Breath     Hx copd wears 3L normally, 85% in triage turned to 4L       HISTORY OF PRESENT ILLNESS: 1 or more Elements   History From: Patient    Limitations to history : None    Nicholas Cruz is a 69 y.o. female who presents to the emergency department increasing shortness of breath. Patient is a history of COPD former smoker. She wears CPAP at night. She uses daily nebulizer treatments. She is on 3 L of oxygen baseline at home. Patient states she is been coming increasing shortness of breath this week. She has had a productive cough for the past 2 weeks her PCP was increased steroids and nebulizer treatments and was using Mucinex. This week she continued to cough for the last 3 days has noticed increasing dyspnea on exertion. She states her oxygen levels last 2 days been dropping with ambulation into the 70s. On arrival, at her baseline 3 L nasal cannula, she was 85%. She was increased to 4 L and is now 95%. Patient denies any chest pain or pleuritic pain she is not anticoagulated. She denies any fevers or chills denies any leg pain or leg swelling. Nursing Notes were all reviewed and agreed with or any disagreements were addressed in the HPI.    ROS:   Pertinent positives and negatives are stated within HPI, all other systems reviewed and are negative.    --------------------------------------------- PAST HISTORY ---------------------------------------------  Past Medical History:  has a past medical history of COPD (chronic obstructive pulmonary disease) ([facility] [address] Hyperlipidemia, and Hypertension.     Past Surgical History:  has a past surgical history that includes  section. Social History:  reports that she quit smoking about 11 years ago. Her smoking use included cigarettes. She started smoking about 52 years ago. She has a 31.00 pack-year smoking history. She has never used smokeless tobacco. She reports that she does not currently use alcohol. She reports that she does not use drugs. Family History: family history includes COPD in her father; Cancer in her mother. The patients home medications have been reviewed. Allergies: Patient has no known allergies. ---------------------------------------------------PHYSICAL EXAM--------------------------------------          Constitutional/General: Alert and oriented x3, well appearing, non toxic in NAD; able to speak in full sentences  Head: Normocephalic and atraumatic  Mouth: Oropharynx clear, handling secretions, no trismus  Neck: Supple, full ROM,  Pulmonary: Lungs with fair air movement bilaterally but diffuse inspiratory expiratory wheezes no rales or rhonchi no tachypnea no accessory muscle use no retractions. Able to speak in full sentences. Not in respiratory distress  Cardiovascular:  Regular rate. Regular rhythm. No murmurs  Chest: no chest wall tenderness  Abdomen: Soft. Non tender. Non distended. No rebound, guarding, or rigidity. No pulsatile masses appreciated. Musculoskeletal: Moves all extremities x 4. Warm and well perfused, no clubbing, cyanosis, or edema. Capillary refill <3 seconds  Skin: warm and dry. No rashes. Neurologic: GCS 15, no gross focal neurologic deficits  Psych: Normal Affect    -------------------------------------------------- RESULTS -------------------------------------------------  I have personally reviewed all laboratory and imaging results for this patient. Results are listed below.      LABS:  Results for orders placed or performed during the hospital encounter of 23   RAPID INFLUENZA A/B ANTIGENS    Specimen: Nasopharyngeal   Result Value Ref Range    Influenza A by PCR Not Detected Not Detected    Influenza B by PCR Not Detected Not Detected   COVID-19, Rapid    Specimen: Nasopharyngeal Swab   Result Value Ref Range    SARS-CoV-2, NAAT Not Detected Not Detected   CBC with Auto Differential   Result Value Ref Range    WBC 11.3 4.5 - 11.5 E9/L    RBC 3.97 3.50 - 5.50 E12/L    Hemoglobin 12.3 11.5 - 15.5 g/dL    Hematocrit 41.3 34.0 - 48.0 %    .0 (H) 80.0 - 99.9 fL    MCH 31.0 26.0 - 35.0 pg    MCHC 29.8 (L) 32.0 - 34.5 %    RDW 14.2 11.5 - 15.0 fL    Platelets 201 130 - 450 E9/L    MPV 11.0 7.0 - 12.0 fL    Neutrophils % 91.5 (H) 43.0 - 80.0 %    Immature Granulocytes % 0.6 0.0 - 5.0 %    Lymphocytes % 6.1 (L) 20.0 - 42.0 %    Monocytes % 1.7 (L) 2.0 - 12.0 %    Eosinophils % 0.0 0.0 - 6.0 %    Basophils % 0.1 0.0 - 2.0 %    Neutrophils Absolute 10.29 (H) 1.80 - 7.30 E9/L    Immature Granulocytes # 0.07 E9/L    Lymphocytes Absolute 0.69 (L) 1.50 - 4.00 E9/L    Monocytes Absolute 0.19 0.10 - 0.95 E9/L    Eosinophils Absolute 0.00 (L) 0.05 - 0.50 E9/L    Basophils Absolute 0.01 0.00 - 0.20 E9/L   Comprehensive Metabolic Panel   Result Value Ref Range    Sodium 137 132 - 146 mmol/L    Potassium 4.4 3.5 - 5.0 mmol/L    Chloride 94 (L) 98 - 107 mmol/L    CO2 34 (H) 22 - 29 mmol/L    Anion Gap 9 7 - 16 mmol/L    Glucose 370 (H) 74 - 99 mg/dL    BUN 25 (H) 6 - 23 mg/dL    Creatinine 0.7 0.5 - 1.0 mg/dL    Est, Glom Filt Rate >60 >=60 mL/min/1.73    Calcium 9.1 8.6 - 10.2 mg/dL    Total Protein 7.4 6.4 - 8.3 g/dL    Albumin 3.6 3.5 - 5.2 g/dL    Total Bilirubin 0.3 0.0 - 1.2 mg/dL    Alkaline Phosphatase 89 35 - 104 U/L    ALT 25 0 - 32 U/L    AST 11 0 - 31 U/L   Troponin   Result Value Ref Range    Troponin, High Sensitivity 10 (H) 0 - 9 ng/L   Brain Natriuretic Peptide   Result Value Ref Range    Pro- (H) 0 - 125 pg/mL   Magnesium   Result Value Ref Range    Magnesium 1.8 1.6 - 2.6 mg/dL   Troponin   Result Value  Ref Range    Troponin, High Sensitivity 9 0 - 9 ng/L   Blood Gas, Arterial   Result Value Ref Range    Date Analyzed 20230105     Time Analyzed 1900     Source: Blood Arterial     pH, Blood Gas 7.380 7.350 - 7.450    PCO2 58.0 (H) 35.0 - 45.0 mmHg    PO2 74.8 (L) 75.0 - 100.0 mmHg    HCO3 33.5 (H) 22.0 - 26.0 mmol/L    B.E. 6.7 (H) -3.0 - 3.0 mmol/L    O2 Sat 94.5 92.0 - 98.5 %    O2Hb 92.6 (L) 94.0 - 97.0 %    COHb 1.7 (H) 0.0 - 1.5 %    MetHb 0.3 0.0 - 1.5 %    O2 Content 16.8 mL/dL    HHb 5.4 (H) 0.0 - 5.0 %    tHb (est) 12.9 11.5 - 16.5 g/dL    Mode NC- 3 L     Date Of Collection      Time Collected      Pt Temp 37.0 C     ID [id number]     Lab [id number]     Critical(s) Notified . No Critical Values        RADIOLOGY:  Interpreted by Radiologist.  CTA PULMONARY W CONTRAST   Final Result   No evidence of pulmonary embolism. Right upper lobe and bilateral lower lobe peripheral patchy airspace   opacities with ground-glass density and airway wall thickening consistent   with multifocal pneumonia. Moderate centrilobular and paraseptal emphysema. Fatty liver. XR CHEST (2 VW)   Final Result   Subtle pneumonia at the right upper lobe. Obstructive airways disease. EKG:  EKG shows normal sinus rhythm and at 92 bpm no signs of any ST changes no ST elevation . No change in prior EKG. EKG interpreted myself.    ------------------------- NURSING NOTES AND VITALS REVIEWED ---------------------------   The nursing notes within the ED encounter and vital signs as below have been reviewed by myself. BP (!) 151/74   Pulse 98   Temp 97.9 °F (36.6 °C) (Oral)   Resp 18   Ht 5' 5\" (1.651 m)   Wt 212 lb (96.2 kg)   SpO2 95%   BMI 35.28 kg/m²   Oxygen Saturation Interpretation: Abnormal and Improved after treatment    The patients available past medical records and past encounters were reviewed.         ------------------------------ ED COURSE/MEDICAL DECISION MAKING----------------------  Medications   cefTRIAXone (ROCEPHIN) 1,000 mg in sterile water 10 mL IV syringe (has no administration in time range)   doxycycline (VIBRAMYCIN) 100 mg in dextrose 5 % 100 mL IVPB (has no administration in time range)   ipratropium-albuterol (DUONEB) nebulizer solution 3 ampule (3 ampules Inhalation Given 1/5/23 1817)   methylPREDNISolone sodium (SOLU-MEDROL) injection 80 mg (80 mg IntraVENous Given 1/5/23 1756)   iopamidol (ISOVUE-370) 76 % injection 75 mL (75 mLs IntraVENous Given 1/5/23 1804)             Medical Decision Making:      Tucker Carreno is a 69 y.o. female with past medical history of COPD hyperlipidemia hypertension who presents to the ED for productive cough x2 weeks no fevers or chills she has had increased dyspnea on exertion despite 3 L nasal cannula at home in the past 2 days. She has had hypoxia with ambulation. She arrived 85% on her baseline 3 L. Stable when increased to 4 L at 95%. Denies any chest pain. Vital signs upon arrival BP (!) 151/74   Pulse 98   Temp 97.9 °F (36.6 °C) (Oral)   Resp 18   Ht 5' 5\" (1.651 m)   Wt 212 lb (96.2 kg)   SpO2 95%   BMI 35.28 kg/m²     Physical exam she has diffuse inspiratory and expiratory wheezes decreased breath sounds throughout. No tachypnea accessory muscles no retractions able speak in full sentences. No respiratory distress      History From: Patient    Limitations to history : None    Chronic conditions:  has a past medical history of COPD (chronic obstructive pulmonary disease) ([facility] [address] Hyperlipidemia, and Hypertension. Differential diagnosis includes but not limited to COPD exacerbation acute hypoxic respiratory failure hypercapnia COVID influenza ACS dysrhythmia PE pneumonia CHF          Patient treated with IV Solu-Medrol as well as 3 DuoNeb treatments. She was also started on IV Rocephin and doxycycline. Oxygen was increased to 4 L nasal cannula from baseline 3 L.     Labs and imaging reviewed by me demonstrate ABG reviewed showed a pH of 7.38 PCO2 of 58. This is down significantly from prior hospitalizations were PCO2 has been 70-80. PO2 was 74 bicarb 33. O2 sat 94.5. No need for BiPAP at this time. Maintaining oxygenation. CBC was normal white count was 11. 3. Chemistry showed slight elevated CO2 at 34. Glucose was 370. Normal anion gap no DKA. Magnesium 1.8. COVID and flu testing were negative. Troponin was normal at 10. BNP was slightly elevated at 329. Chest x-ray question subtle pneumonia at the right upper lobe. CTA of the chest showed no PE but again right upper lobe and lower lobe Opacities consistent with multifocal pneumonia. Patient did arrive from home, so she was started on IV Rocephin and doxycycline. Patient is maintaining oxygenation at 4 L nasal cannula at 94%. To better aeration after DuoNeb treatments and steroids. Speaking full sentences no distress no need for BiPAP at this time. I spoke to the hospitalist agreed to admit the patient to the medicine service telemetry bed. Discussion with Other Profesionals : Admitting Team hospitalist, Dr. Washington Doctor, who agreed to admit. Social Determinants : None    Records Reviewed : Inpatient Notes was admitted in 10/21 with acute on chronic hypoxic and hypercapnic respiratory failure treated with BiPAP. Re-Evaluations/Consultations:             ED Course as of 01/05/23 2043   Thu Jan 05, 2023   6963 I reviewed last inpatient discharge summary from October 2021 by Sobia Gill. Patient patient has been admitted for acute on chronic hypoxic and hypercapnic respiratory failure treated with BiPAP nebulizer treatments and steroids. [LH]   8894 Patient was 85% with ambulation  in triage on her baseline 3 L nasal cannula. She was increased to 4 L and is maintaining 95%. [GX]   6261 Patient states this week she has been on 60 mg of prednisone daily.   She has been using home nebulizer treatments but still feels more short of breath with ambulating. She does wear CPAP at night. [KK]   2040 Improvement of aeration with nebulizer treatments. Still requiring 4 L nasal cannula stable. Not requiring BiPAP at this time. Patient will be admitted for further management IV antibiotics with Rocephin and doxycycline were ordered. [KK]   2042 I spoke to Dr. Brandin Sanchez from the hospitalist service accepted the patient to their service will admit to Dr. Arnie Carter MD           This patient has remained hemodynamically stable, improved, and been closely monitored during their ED course. Disposition:   Admission to medicine telemetry bed            Counseling: The emergency provider has spoken with the patient and spouse/SO and discussed todays results, in addition to providing specific details for the plan of care and counseling regarding the diagnosis and prognosis. Questions are answered at this time and they are agreeable with the plan.       --------------------------------- IMPRESSION AND DISPOSITION ---------------------------------    IMPRESSION  1. COPD exacerbation ([address]    2. Pneumonia of right lower lobe due to infectious organism    3. Acute respiratory failure with hypoxia (HCC)        DISPOSITION  Disposition: Admit to telemetry  Patient condition is fair    CRITICAL CARE:   33 MINUTES. Please note that the withdrawal or failure to initiate urgent interventions for this patient would likely result in a life threatening deterioration or permanent disability. Accordingly this patient received the above mentioned time, excluding separately billable procedures. NOTE: This report was transcribed using voice recognition software.  Every effort was made to ensure accuracy; however, inadvertent computerized transcription errors may be present         Liang Hillman MD  01/07/23 9488

## 2023-01-06 LAB
ADENOVIRUS BY PCR: NOT DETECTED
ALBUMIN SERPL-MCNC: 3 G/DL (ref 3.5–5.2)
ALP BLD-CCNC: 74 U/L (ref 35–104)
ALT SERPL-CCNC: 20 U/L (ref 0–32)
ANION GAP SERPL CALCULATED.3IONS-SCNC: 7 MMOL/L (ref 7–16)
AST SERPL-CCNC: 9 U/L (ref 0–31)
BASOPHILS ABSOLUTE: 0 E9/L (ref 0–0.2)
BASOPHILS RELATIVE PERCENT: 0 % (ref 0–2)
BILIRUB SERPL-MCNC: <0.2 MG/DL (ref 0–1.2)
BORDETELLA PARAPERTUSSIS BY PCR: NOT DETECTED
BORDETELLA PERTUSSIS BY PCR: NOT DETECTED
BUN BLDV-MCNC: 24 MG/DL (ref 6–23)
CALCIUM SERPL-MCNC: 8.8 MG/DL (ref 8.6–10.2)
CHLAMYDOPHILIA PNEUMONIAE BY PCR: NOT DETECTED
CHLORIDE BLD-SCNC: 95 MMOL/L (ref 98–107)
CO2: 35 MMOL/L (ref 22–29)
CORONAVIRUS 229E BY PCR: NOT DETECTED
CORONAVIRUS HKU1 BY PCR: NOT DETECTED
CORONAVIRUS NL63 BY PCR: NOT DETECTED
CORONAVIRUS OC43 BY PCR: NOT DETECTED
CREAT SERPL-MCNC: 0.6 MG/DL (ref 0.5–1)
EKG ATRIAL RATE: 92 BPM
EKG P AXIS: 63 DEGREES
EKG P-R INTERVAL: 134 MS
EKG Q-T INTERVAL: 362 MS
EKG QRS DURATION: 86 MS
EKG QTC CALCULATION (BAZETT): 447 MS
EKG R AXIS: 46 DEGREES
EKG T AXIS: 53 DEGREES
EKG VENTRICULAR RATE: 92 BPM
EOSINOPHILS ABSOLUTE: 0 E9/L (ref 0.05–0.5)
EOSINOPHILS RELATIVE PERCENT: 0 % (ref 0–6)
GFR SERPL CREATININE-BSD FRML MDRD: >60 ML/MIN/1.73
GLUCOSE BLD-MCNC: 356 MG/DL (ref 74–99)
HBA1C MFR BLD: 8.7 % (ref 4–5.6)
HCT VFR BLD CALC: 38.6 % (ref 34–48)
HEMOGLOBIN: 11.5 G/DL (ref 11.5–15.5)
HUMAN METAPNEUMOVIRUS BY PCR: NOT DETECTED
HUMAN RHINOVIRUS/ENTEROVIRUS BY PCR: NOT DETECTED
INFLUENZA A BY PCR: NOT DETECTED
INFLUENZA B BY PCR: NOT DETECTED
L. PNEUMOPHILA SEROGP 1 UR AG: NORMAL
LYMPHOCYTES ABSOLUTE: 1.1 E9/L (ref 1.5–4)
LYMPHOCYTES RELATIVE PERCENT: 15 % (ref 20–42)
MCH RBC QN AUTO: 31.3 PG (ref 26–35)
MCHC RBC AUTO-ENTMCNC: 29.8 % (ref 32–34.5)
MCV RBC AUTO: 104.9 FL (ref 80–99.9)
METAMYELOCYTES RELATIVE PERCENT: 1 % (ref 0–1)
METER GLUCOSE: 226 MG/DL (ref 74–99)
METER GLUCOSE: 295 MG/DL (ref 74–99)
METER GLUCOSE: 300 MG/DL (ref 74–99)
METER GLUCOSE: 333 MG/DL (ref 74–99)
METER GLUCOSE: 354 MG/DL (ref 74–99)
MONOCYTES ABSOLUTE: 0.15 E9/L (ref 0.1–0.95)
MONOCYTES RELATIVE PERCENT: 2 % (ref 2–12)
MYCOPLASMA PNEUMONIAE BY PCR: NOT DETECTED
NEUTROPHILS ABSOLUTE: 6.06 E9/L (ref 1.8–7.3)
NEUTROPHILS RELATIVE PERCENT: 82 % (ref 43–80)
PARAINFLUENZA VIRUS 1 BY PCR: NOT DETECTED
PARAINFLUENZA VIRUS 2 BY PCR: NOT DETECTED
PARAINFLUENZA VIRUS 3 BY PCR: NOT DETECTED
PARAINFLUENZA VIRUS 4 BY PCR: NOT DETECTED
PDW BLD-RTO: 13.9 FL (ref 11.5–15)
PLATELET # BLD: 193 E9/L (ref 130–450)
PMV BLD AUTO: 11.4 FL (ref 7–12)
POLYCHROMASIA: ABNORMAL
POTASSIUM REFLEX MAGNESIUM: 4.7 MMOL/L (ref 3.5–5)
PROCALCITONIN: 0.09 NG/ML (ref 0–0.08)
RBC # BLD: 3.68 E12/L (ref 3.5–5.5)
RESPIRATORY SYNCYTIAL VIRUS BY PCR: NOT DETECTED
SARS-COV-2, PCR: NOT DETECTED
SODIUM BLD-SCNC: 137 MMOL/L (ref 132–146)
STREP PNEUMONIAE ANTIGEN, URINE: NORMAL
TOTAL PROTEIN: 6.5 G/DL (ref 6.4–8.3)
WBC # BLD: 7.3 E9/L (ref 4.5–11.5)

## 2023-01-06 PROCEDURE — 94640 AIRWAY INHALATION TREATMENT: CPT

## 2023-01-06 PROCEDURE — 93010 ELECTROCARDIOGRAM REPORT: CPT | Performed by: INTERNAL MEDICINE

## 2023-01-06 PROCEDURE — 6360000002 HC RX W HCPCS: Performed by: NURSE PRACTITIONER

## 2023-01-06 PROCEDURE — 84145 PROCALCITONIN (PCT): CPT

## 2023-01-06 PROCEDURE — 99231 SBSQ HOSP IP/OBS SF/LOW 25: CPT | Performed by: STUDENT IN AN ORGANIZED HEALTH CARE EDUCATION/TRAINING PROGRAM

## 2023-01-06 PROCEDURE — 87449 NOS EACH ORGANISM AG IA: CPT

## 2023-01-06 PROCEDURE — 2060000000 HC ICU INTERMEDIATE R&B

## 2023-01-06 PROCEDURE — 6370000000 HC RX 637 (ALT 250 FOR IP): Performed by: NURSE PRACTITIONER

## 2023-01-06 PROCEDURE — 0202U NFCT DS 22 TRGT SARS-COV-2: CPT

## 2023-01-06 PROCEDURE — 85025 COMPLETE CBC W/AUTO DIFF WBC: CPT

## 2023-01-06 PROCEDURE — 36415 COLL VENOUS BLD VENIPUNCTURE: CPT

## 2023-01-06 PROCEDURE — 2580000003 HC RX 258: Performed by: NURSE PRACTITIONER

## 2023-01-06 PROCEDURE — 83036 HEMOGLOBIN GLYCOSYLATED A1C: CPT

## 2023-01-06 PROCEDURE — 82962 GLUCOSE BLOOD TEST: CPT

## 2023-01-06 PROCEDURE — 80053 COMPREHEN METABOLIC PANEL: CPT

## 2023-01-06 PROCEDURE — APPSS30 APP SPLIT SHARED TIME 16-30 MINUTES: Performed by: NURSE PRACTITIONER

## 2023-01-06 RX ORDER — ARFORMOTEROL TARTRATE 15 UG/2ML
15 SOLUTION RESPIRATORY (INHALATION) 2 TIMES DAILY
Status: DISCONTINUED | OUTPATIENT
Start: 2023-01-06 | End: 2023-01-10 | Stop reason: HOSPADM

## 2023-01-06 RX ORDER — ARFORMOTEROL TARTRATE 15 UG/2ML
15 SOLUTION RESPIRATORY (INHALATION) 2 TIMES DAILY
Status: DISCONTINUED | OUTPATIENT
Start: 2023-01-06 | End: 2023-01-06 | Stop reason: SDUPTHER

## 2023-01-06 RX ORDER — BENZONATATE 100 MG/1
200 CAPSULE ORAL 3 TIMES DAILY PRN
Status: DISCONTINUED | OUTPATIENT
Start: 2023-01-06 | End: 2023-01-10 | Stop reason: HOSPADM

## 2023-01-06 RX ORDER — METHYLPREDNISOLONE SODIUM SUCCINATE 40 MG/ML
40 INJECTION, POWDER, LYOPHILIZED, FOR SOLUTION INTRAMUSCULAR; INTRAVENOUS EVERY 12 HOURS
Status: DISCONTINUED | OUTPATIENT
Start: 2023-01-06 | End: 2023-01-07

## 2023-01-06 RX ORDER — BUDESONIDE 0.5 MG/2ML
0.5 INHALANT ORAL 2 TIMES DAILY
Status: DISCONTINUED | OUTPATIENT
Start: 2023-01-06 | End: 2023-01-06 | Stop reason: SDUPTHER

## 2023-01-06 RX ORDER — INSULIN GLARGINE 100 [IU]/ML
15 INJECTION, SOLUTION SUBCUTANEOUS NIGHTLY
Status: DISCONTINUED | OUTPATIENT
Start: 2023-01-06 | End: 2023-01-08

## 2023-01-06 RX ORDER — ATORVASTATIN CALCIUM 10 MG/1
10 TABLET, FILM COATED ORAL NIGHTLY
Status: DISCONTINUED | OUTPATIENT
Start: 2023-01-06 | End: 2023-01-10 | Stop reason: HOSPADM

## 2023-01-06 RX ORDER — BUDESONIDE 0.5 MG/2ML
0.5 INHALANT ORAL 2 TIMES DAILY
Status: DISCONTINUED | OUTPATIENT
Start: 2023-01-06 | End: 2023-01-10 | Stop reason: HOSPADM

## 2023-01-06 RX ORDER — INSULIN LISPRO 100 [IU]/ML
0-4 INJECTION, SOLUTION INTRAVENOUS; SUBCUTANEOUS NIGHTLY
Status: DISCONTINUED | OUTPATIENT
Start: 2023-01-06 | End: 2023-01-10 | Stop reason: HOSPADM

## 2023-01-06 RX ORDER — INSULIN LISPRO 100 [IU]/ML
0-8 INJECTION, SOLUTION INTRAVENOUS; SUBCUTANEOUS
Status: DISCONTINUED | OUTPATIENT
Start: 2023-01-06 | End: 2023-01-10 | Stop reason: HOSPADM

## 2023-01-06 RX ORDER — INSULIN LISPRO 100 [IU]/ML
4 INJECTION, SOLUTION INTRAVENOUS; SUBCUTANEOUS ONCE
Status: COMPLETED | OUTPATIENT
Start: 2023-01-06 | End: 2023-01-06

## 2023-01-06 RX ADMIN — SERTRALINE 50 MG: 50 TABLET, FILM COATED ORAL at 08:37

## 2023-01-06 RX ADMIN — ARFORMOTEROL TARTRATE 15 MCG: 15 SOLUTION RESPIRATORY (INHALATION) at 08:44

## 2023-01-06 RX ADMIN — IPRATROPIUM BROMIDE AND ALBUTEROL SULFATE 1 AMPULE: .5; 2.5 SOLUTION RESPIRATORY (INHALATION) at 12:27

## 2023-01-06 RX ADMIN — INSULIN LISPRO 6 UNITS: 100 INJECTION, SOLUTION INTRAVENOUS; SUBCUTANEOUS at 16:02

## 2023-01-06 RX ADMIN — SODIUM CHLORIDE, PRESERVATIVE FREE 10 ML: 5 INJECTION INTRAVENOUS at 19:59

## 2023-01-06 RX ADMIN — LISINOPRIL 20 MG: 10 TABLET ORAL at 08:37

## 2023-01-06 RX ADMIN — INSULIN LISPRO 4 UNITS: 100 INJECTION, SOLUTION INTRAVENOUS; SUBCUTANEOUS at 00:09

## 2023-01-06 RX ADMIN — WATER 1000 MG: 1 INJECTION INTRAMUSCULAR; INTRAVENOUS; SUBCUTANEOUS at 19:59

## 2023-01-06 RX ADMIN — DOXYCYCLINE HYCLATE 100 MG: 100 CAPSULE ORAL at 08:37

## 2023-01-06 RX ADMIN — IPRATROPIUM BROMIDE AND ALBUTEROL SULFATE 1 AMPULE: .5; 2.5 SOLUTION RESPIRATORY (INHALATION) at 15:33

## 2023-01-06 RX ADMIN — AMLODIPINE BESYLATE 5 MG: 5 TABLET ORAL at 08:37

## 2023-01-06 RX ADMIN — INSULIN LISPRO 1 UNITS: 100 INJECTION, SOLUTION INTRAVENOUS; SUBCUTANEOUS at 10:47

## 2023-01-06 RX ADMIN — ARFORMOTEROL TARTRATE 15 MCG: 15 SOLUTION RESPIRATORY (INHALATION) at 19:29

## 2023-01-06 RX ADMIN — BUDESONIDE 500 MCG: 0.5 SUSPENSION RESPIRATORY (INHALATION) at 08:44

## 2023-01-06 RX ADMIN — IPRATROPIUM BROMIDE AND ALBUTEROL SULFATE 1 AMPULE: .5; 2.5 SOLUTION RESPIRATORY (INHALATION) at 19:29

## 2023-01-06 RX ADMIN — PANTOPRAZOLE SODIUM 40 MG: 40 TABLET, DELAYED RELEASE ORAL at 06:03

## 2023-01-06 RX ADMIN — SODIUM CHLORIDE, PRESERVATIVE FREE 10 ML: 5 INJECTION INTRAVENOUS at 00:09

## 2023-01-06 RX ADMIN — METHYLPREDNISOLONE SODIUM SUCCINATE 40 MG: 40 INJECTION, POWDER, FOR SOLUTION INTRAMUSCULAR; INTRAVENOUS at 19:59

## 2023-01-06 RX ADMIN — METHYLPREDNISOLONE SODIUM SUCCINATE 40 MG: 40 INJECTION, POWDER, FOR SOLUTION INTRAMUSCULAR; INTRAVENOUS at 08:36

## 2023-01-06 RX ADMIN — IPRATROPIUM BROMIDE AND ALBUTEROL SULFATE 1 AMPULE: .5; 2.5 SOLUTION RESPIRATORY (INHALATION) at 08:44

## 2023-01-06 RX ADMIN — INSULIN LISPRO 3 UNITS: 100 INJECTION, SOLUTION INTRAVENOUS; SUBCUTANEOUS at 06:03

## 2023-01-06 RX ADMIN — DOXYCYCLINE HYCLATE 100 MG: 100 CAPSULE ORAL at 19:57

## 2023-01-06 RX ADMIN — BENZONATATE 200 MG: 100 CAPSULE ORAL at 21:53

## 2023-01-06 RX ADMIN — ENOXAPARIN SODIUM 40 MG: 100 INJECTION SUBCUTANEOUS at 08:37

## 2023-01-06 RX ADMIN — ATORVASTATIN CALCIUM 10 MG: 10 TABLET, FILM COATED ORAL at 19:57

## 2023-01-06 RX ADMIN — INSULIN LISPRO 4 UNITS: 100 INJECTION, SOLUTION INTRAVENOUS; SUBCUTANEOUS at 00:24

## 2023-01-06 RX ADMIN — BUDESONIDE 500 MCG: 0.5 SUSPENSION RESPIRATORY (INHALATION) at 19:29

## 2023-01-06 RX ADMIN — ATORVASTATIN CALCIUM 20 MG: 20 TABLET, FILM COATED ORAL at 00:08

## 2023-01-06 RX ADMIN — INSULIN GLARGINE 15 UNITS: 100 INJECTION, SOLUTION SUBCUTANEOUS at 20:03

## 2023-01-06 RX ADMIN — SODIUM CHLORIDE, PRESERVATIVE FREE 10 ML: 5 INJECTION INTRAVENOUS at 08:37

## 2023-01-06 NOTE — FLOWSHEET NOTE
Patient resting in bed respirations non labored skin warm dry intact ,vitals stable. Oxygen on 3L. Patient denies any pain at this time.  Will continue to monitor

## 2023-01-06 NOTE — PROGRESS NOTES
Pt with coughing attack. Oxygen saturation was 84% on 3L while sitting on the edge of bed. Oxygen increased.    Recovery pulse ox was 93% on 5 liters of oxygen at rest rest.

## 2023-01-06 NOTE — H&P
[facility] History and Physical      CHIEF COMPLAINT:  Shortness of breath    History of Present Illness: This is a 69year old female with PMH of COPD, HLD, and HTN. Patient to ED due to increased shortness of breath this last week. Reports being sick for the last 6 weeks. Has followed with PCP and has received antibiotics, prednisone, Mucinex, and Claritin. Patient reports no changes in symptoms with medications. Admits to worsening of shortness of breath this last week. On 2 L via nasal cannula at baseline at home. Oxygenation saturation 85% on 3 L on arrival to ED. Patient reports oxygenation saturation going down into the 70s with ambulation at home. Currently on 3 L via nasal cannula in ED with pulse ox of 94% at rest.  Associated symptoms include postnasal drip, and productive cough. Denies chest pain, back pain, abdominal pain, and changes in bowel/bladder habits. ABGs showing hypoxia and hypercapnia. Chest x-ray showing pneumonia in the right upper lobe and obstructive airway disease. CTA of the chest positive for right upper lobe and bilateral lower lobe peripheral patchy airspace opacities with groundglass densities consistent with multifocal pneumonia. Patient short of breath with conversation and minimal movement. Will admit for additional evaluation and treatment. Informant(s) for H&P: Patient, patient's , and chart review    REVIEW OF SYSTEMS:  A comprehensive review of systems was negative except for: what is in the HPI      PMH:  Past Medical History:   Diagnosis Date    COPD (chronic obstructive pulmonary disease) ([facility] [address]     Hyperlipidemia     Hypertension        Surgical History:  Past Surgical History:   Procedure Laterality Date     SECTION         Medications Prior to Admission:    Prior to Admission medications    Medication Sig Start Date End Date Taking?  Authorizing Provider   ipratropium-albuterol (DUONEB) 0.5-2.5 (3) MG/3ML SOLN nebulizer solution Inhale 3 mLs into the lungs every 4 hours as needed for Shortness of Breath 10/28/21   Tyree Yadav MD   sertraline (ZOLOFT) 50 MG tablet Take 50 mg by mouth daily    Historical Provider, MD   omeprazole (PRILOSEC) 20 MG delayed release capsule Take 20 mg by mouth every morning (before breakfast)    Historical Provider, MD   amLODIPine (NORVASC) 10 MG tablet TAKE ONE TABLET BY MOUTH EVERY DAY  Patient taking differently: 5 mg daily TAKE ONE TABLET BY MOUTH EVERY DAY 6/2/20   Brijesh Feldman PA-C   lisinopril (PRINIVIL;ZESTRIL) 20 MG tablet TAKE ONE TABLET BY MOUTH EVERY DAY 6/2/20   Brijesh Feldman PA-C   simvastatin (ZOCOR) 40 MG tablet TAKE ONE TABLET BY MOUTH EVERY DAY  Patient taking differently: 40 mg nightly  6/2/20   Brijesh Feldman PA-C   SPIRIVA HANDIHALER 18 MCG inhalation capsule INHALE ONE CAPSULE VIA HANDIHALER EVERY DAY 6/2/20   Brijesh Feldman PA-C   VENTOLIN  (90 Base) MCG/ACT inhaler INHALE TWO PUFFS BY MOUTH FOUR TIMES A DAY 6/2/20   Brijesh Feldman PA-C       Allergies:    Patient has no known allergies. Social History:    reports that she quit smoking about 11 years ago. Her smoking use included cigarettes. She started smoking about 52 years ago. She has a 31.00 pack-year smoking history. She has never used smokeless tobacco. She reports that she does not currently use alcohol. She reports that she does not use drugs. Family History:   family history includes COPD in her father; Cancer in her mother. PHYSICAL EXAM:  Vitals:  BP (!) 151/74   Pulse 98   Temp 97.9 °F (36.6 °C) (Oral)   Resp 18   Ht 5' 5\" (1.651 m)   Wt 212 lb (96.2 kg)   SpO2 95%   BMI 35.28 kg/m²     General Appearance: ILL appearing, alert and oriented to person, place and time.   Skin: warm and dry  Head: normocephalic and atraumatic  Eyes: pupils equal, round, and reactive to light, conjunctivae normal  Pulmonary/Chest: Expiratory wheezes throughout with diminished lung sounds posterior bases.  O2 3 L via nasal cannula. Tachypnea noted. Nonproductive cough. Cardiovascular: normal rate, normal S1 and S2   Abdomen: soft, non-tender, non-distended, normal bowel sounds, no masses or organomegaly  Extremities: no cyanosis, no clubbing and no edema  Neurologic: speech normal        LABS:  Recent Labs     01/05/23  1346      K 4.4   CL 94*   CO2 34*   BUN 25*   CREATININE 0.7   GLUCOSE 370*   CALCIUM 9.1       Recent Labs     01/05/23  1346   WBC 11.3   RBC 3.97   HGB 12.3   HCT 41.3   .0*   MCH 31.0   MCHC 29.8*   RDW 14.2      MPV 11.0       No results for input(s): POCGLU in the last 72 hours. Radiology:   CTA PULMONARY W CONTRAST   Final Result   No evidence of pulmonary embolism. Right upper lobe and bilateral lower lobe peripheral patchy airspace   opacities with ground-glass density and airway wall thickening consistent   with multifocal pneumonia. Moderate centrilobular and paraseptal emphysema. Fatty liver. XR CHEST (2 VW)   Final Result   Subtle pneumonia at the right upper lobe. Obstructive airways disease. EKG: Not available    ASSESSMENT:      Principal Problem:    Acute respiratory failure with hypoxia (HCC)  Active Problems:    Multifocal pneumonia    Essential hypertension    COPD exacerbation (HCC)  Resolved Problems:    * No resolved hospital problems. *      PLAN:    1. Acute respiratory failure with hypoxia-wean oxygen back to baseline as tolerated. DuoNeb treatments every 4 hours while awake. Solu-Medrol 80 mg given in ED. We will continue Solu-Medrol daily. 2.  Multifocal pneumonia-started on Rocephin and doxycycline in ED. We will continue antibiotics. Check urine antigens. Check procalcitonin. Continue steroids and duo nebs. 3.  COPD exacerbation-treat with antibiotics, steroids, and duo nebs. 4.  Essential hypertension-monitor BP. Continue Norvasc and lisinopril.   5.  Diabetes mellitus type 2-ACH S blood sugar checks with low-dose sliding scale coverage. Hold metformin for now. Blood sugar elevated 300s. Check hemoglobin A1c. Code Status: Full code  DVT prophylaxis: Lovenox    35 minutes or more spent reviewing patient chart, assessing patient, discussing plan of care with patient and family, discussing plan of care with collaborating physician, and documentation. NOTE: This report was transcribed using voice recognition software. Every effort was made to ensure accuracy; however, inadvertent computerized transcription errors may be present. Electronically signed by LINWOOD Santiago CNP on 1/5/2023 at 9:19 PM     Attending Physician Statement:  Meg Walter M.D., F.A.C.P. I have seen and examined +reviewed pertinent history and exam findings with NP   Prior ER/Hospital and outpatient charts reviewed, including other providers notes, relevant labs and imaging. Meds reviewed.    I agree with the NP history/PE, assessment, plan and orders >50% of time spent coordinating care -including ER team, then pimary service who will take over +other providers and/or counseling patient/family (split billing based on medical complexity)  Remainder of relevant medical problems as per NP note+My assessment of various problems as documented below  Copd with bronchospastic exacerbation  - tight wheezing- steroids, duoneb  Acute on chronic hypoxic hypoxic respiratory fairlure- inc o2, bipap if need  B/l communicty acquired pneumonia   Workup pneumonia, abx  HTN +DM2

## 2023-01-06 NOTE — PROGRESS NOTES
[facility] Progress Note    Admitting Date and Time: 1/5/2023  5:22 PM  Admit Dx: COPD exacerbation ([facility] [address] [J44.1]  [facility] [J96.01]  Pneumonia of right lower lobe due to infectious organism [J18.9]      Assessment:    Principal Problem:    Multifocal pneumonia  Active Problems:    Acute respiratory failure with hypoxia ([facility] [address]    Essential hypertension    COPD [facility]ation ([facility]  Resolved Problems:    * No resolved hospital problems. *      Plan:  1. AECOPD  - hx of COPD, is to be on Trelegy, albuterol, spiriva, and Duo-nebs at home  Respiratory viral panel negative    Continue with IV Solu-medrol - change to BID today and plan to transition to prednisone taper at discharge  Scheduled Duonebs, Pulmicort and brovana continue    2. PNA, unspecified organism  - strep PNA and legionella antigens are negative  Respiratory viral panel negative    CTA does show infiltrates RUL and BLLs  Continue CTX and doxycycline - convert to PO at time of discharge    3. Acute on chronic hypoxemic/hypercarbic respiratory failure  - ABG on admission showed mild hypercarbia PaCO2 of 58 and hypoxia, PaO2 of 75. Baseline she is on 2 LPM - states with any activity becoming breathless and more hypoxic. Continue treatment of above and wean o2 back to baseline    Repeat ambulatory testing prior to discharge to ensure she has appropriate directions for home O2 use. 4. DM with hyperglycemia  - A1c is 8.7%  Hyperglycemic with recent use of steroids. Medium dose sliding scale and add bedtime Lantus    5. HTN  - continue Norvasc and lisinopril    6. HLD  - continue statin    7. Depression/Anxiety  - continue zoloft    8.  GERD  - continue PPI    VTE prophylaxis: Lovenox        Subjective:  Patient is being followed for COPD exacerbation ([facility] [address] [J44.1]  Acute respiratory failure with hypoxia ([facility] [address] [J96.01]  Pneumonia of right lower lobe due to infectious organism [J18.9]     Has been ill off and on since mid November. Treated outpatient with Abx, steroids. Gets better when on antibiotics, then seems to feel worse as soon as finished. This episode started 6 days ago - increased coughing, congestion, sputum, chest tightness and increased use of home nebs. Home PO2 in 70% range despite increased O2 flow rate    1/6/23 -> she is back to 3 LPM which is baseline. Feels better but still with moist cough and sputum. No fevers overnight. Still feels tight and wheezy. Discussed asking for pulm consult - she declines at this time    ROS: denies fever, chills, n/v, HA unless stated above. atorvastatin  10 mg Oral Nightly    arformoterol tartrate  15 mcg Nebulization BID    And    budesonide  0.5 mg Nebulization BID    insulin lispro  0-8 Units SubCUTAneous TID WC    insulin lispro  0-4 Units SubCUTAneous Nightly    sodium chloride flush  5-40 mL IntraVENous 2 times per day    enoxaparin  40 mg SubCUTAneous Daily    ipratropium-albuterol  1 ampule Inhalation Q4H WA    cefTRIAXone (ROCEPHIN) IV  1,000 mg IntraVENous Q24H    And    doxycycline hyclate  100 mg Oral 2 times per day    methylPREDNISolone  40 mg IntraVENous Daily    amLODIPine  5 mg Oral Daily    lisinopril  20 mg Oral Daily    pantoprazole  40 mg Oral QAM AC    sertraline  50 mg Oral Daily     benzonatate, 200 mg, TID PRN  sodium chloride flush, 5-40 mL, PRN  sodium chloride, , PRN  ondansetron, 4 mg, Q8H PRN   Or  ondansetron, 4 mg, Q6H PRN  polyethylene glycol, 17 g, Daily PRN  acetaminophen, 650 mg, Q6H PRN   Or  acetaminophen, 650 mg, Q6H PRN  glucose, 4 tablet, PRN  dextrose bolus, 125 mL, PRN   Or  dextrose bolus, 250 mL, PRN  glucagon (rDNA), 1 mg, PRN  dextrose, , Continuous PRN         Objective:    BP (!) 144/74   Pulse 78   Temp 97.7 °F (36.5 °C) (Oral)   Resp 18   Ht 5' 5\" (1.651 m)   Wt 217 lb 14.4 oz (98.8 kg)   SpO2 94%   BMI 36.26 kg/m²     General Appearance: comfortable in bed, back to 3 LPM NC.  No acute distress  Skin: warm and dry  Head: normocephalic and atraumatic  Eyes: pupils equal, round, and reactive to light, extraocular eye movements intact, conjunctivae normal  Neck: neck supple and non tender without mass   Pulmonary/Chest: good air entry, bilateral expiratory wheezing noted. Cardiovascular: normal rate, normal S1 and S2 and no carotid bruits  Abdomen: soft, non-tender, non-distended, normal bowel sounds, no masses or organomegaly  Extremities: no cyanosis, no clubbing and no edema  Neurologic: no cranial nerve deficit and speech normal        Recent Labs     01/05/23  1346 01/06/23  0500    137   K 4.4 4.7   CL 94* 95*   CO2 34* 35*   BUN 25* 24*   CREATININE 0.7 0.6   GLUCOSE 370* 356*   CALCIUM 9.1 8.8       Recent Labs     01/05/23  1346 01/06/23  0424   WBC 11.3 7.3   RBC 3.97 3.68   HGB 12.3 11.5   HCT 41.3 38.6   .0* 104.9*   MCH 31.0 31.3   MCHC 29.8* 29.8*   RDW 14.2 13.9    193   MPV 11.0 11.4       Radiology:     CTA CHEST 1/5/23  FINDINGS:   Pulmonary Arteries: Pulmonary arteries are adequately opacified for evaluation. No evidence of intraluminal filling defect to suggest pulmonary embolism. Main pulmonary artery is normal in caliber. Mediastinum: No evidence of mediastinal lymphadenopathy. The heart and pericardium demonstrate no acute abnormality. Moderate coronary artery calcifications are seen. There is no acute abnormality of the thoracic aorta. Lungs/pleura: Moderate centrilobular and paraseptal emphysema is noted. Posterior right upper lobe and bilateral lower lobe basilar peripheral patchy airspace opacities are seen with associated ground-glass density and airway wall thickening. No pleural effusion or pneumothorax seen. Upper Abdomen: Diffusely decreased hepatic attenuation is noted suggestive of fatty infiltration. Soft Tissues/Bones: No acute bone or soft tissue abnormality. NOTE: This report was transcribed using voice recognition software. Every effort was made to ensure accuracy; however, inadvertent computerized transcription errors may be present.   Electronically signed by LINWOOD Kaur CNP on 1/6/2023 at 12:59 PM

## 2023-01-07 LAB
ALBUMIN SERPL-MCNC: 3.5 G/DL (ref 3.5–5.2)
ALP BLD-CCNC: 81 U/L (ref 35–104)
ALT SERPL-CCNC: 24 U/L (ref 0–32)
ANION GAP SERPL CALCULATED.3IONS-SCNC: 9 MMOL/L (ref 7–16)
AST SERPL-CCNC: 12 U/L (ref 0–31)
BASOPHILS ABSOLUTE: 0 E9/L (ref 0–0.2)
BASOPHILS RELATIVE PERCENT: 0 % (ref 0–2)
BILIRUB SERPL-MCNC: <0.2 MG/DL (ref 0–1.2)
BUN BLDV-MCNC: 32 MG/DL (ref 6–23)
CALCIUM SERPL-MCNC: 9.5 MG/DL (ref 8.6–10.2)
CHLORIDE BLD-SCNC: 93 MMOL/L (ref 98–107)
CO2: 34 MMOL/L (ref 22–29)
CREAT SERPL-MCNC: 0.7 MG/DL (ref 0.5–1)
EOSINOPHILS ABSOLUTE: 0 E9/L (ref 0.05–0.5)
EOSINOPHILS RELATIVE PERCENT: 0 % (ref 0–6)
GFR SERPL CREATININE-BSD FRML MDRD: >60 ML/MIN/1.73
GLUCOSE BLD-MCNC: 327 MG/DL (ref 74–99)
HCT VFR BLD CALC: 39.3 % (ref 34–48)
HEMOGLOBIN: 12 G/DL (ref 11.5–15.5)
IMMATURE GRANULOCYTES #: 0.08 E9/L
IMMATURE GRANULOCYTES %: 1 % (ref 0–5)
LYMPHOCYTES ABSOLUTE: 1.05 E9/L (ref 1.5–4)
LYMPHOCYTES RELATIVE PERCENT: 12.6 % (ref 20–42)
MCH RBC QN AUTO: 31.4 PG (ref 26–35)
MCHC RBC AUTO-ENTMCNC: 30.5 % (ref 32–34.5)
MCV RBC AUTO: 102.9 FL (ref 80–99.9)
METER GLUCOSE: 256 MG/DL (ref 74–99)
METER GLUCOSE: 269 MG/DL (ref 74–99)
METER GLUCOSE: 296 MG/DL (ref 74–99)
METER GLUCOSE: 338 MG/DL (ref 74–99)
MONOCYTES ABSOLUTE: 0.18 E9/L (ref 0.1–0.95)
MONOCYTES RELATIVE PERCENT: 2.2 % (ref 2–12)
NEUTROPHILS ABSOLUTE: 7.03 E9/L (ref 1.8–7.3)
NEUTROPHILS RELATIVE PERCENT: 84.2 % (ref 43–80)
PDW BLD-RTO: 14.3 FL (ref 11.5–15)
PLATELET # BLD: 209 E9/L (ref 130–450)
PMV BLD AUTO: 11.1 FL (ref 7–12)
POTASSIUM SERPL-SCNC: 4.7 MMOL/L (ref 3.5–5)
PROCALCITONIN: 0.09 NG/ML (ref 0–0.08)
RBC # BLD: 3.82 E12/L (ref 3.5–5.5)
SODIUM BLD-SCNC: 136 MMOL/L (ref 132–146)
TOTAL PROTEIN: 7 G/DL (ref 6.4–8.3)
WBC # BLD: 8.3 E9/L (ref 4.5–11.5)

## 2023-01-07 PROCEDURE — 2060000000 HC ICU INTERMEDIATE R&B

## 2023-01-07 PROCEDURE — 85025 COMPLETE CBC W/AUTO DIFF WBC: CPT

## 2023-01-07 PROCEDURE — 82962 GLUCOSE BLOOD TEST: CPT

## 2023-01-07 PROCEDURE — 6360000002 HC RX W HCPCS: Performed by: NURSE PRACTITIONER

## 2023-01-07 PROCEDURE — 99231 SBSQ HOSP IP/OBS SF/LOW 25: CPT | Performed by: STUDENT IN AN ORGANIZED HEALTH CARE EDUCATION/TRAINING PROGRAM

## 2023-01-07 PROCEDURE — 80053 COMPREHEN METABOLIC PANEL: CPT

## 2023-01-07 PROCEDURE — 6370000000 HC RX 637 (ALT 250 FOR IP): Performed by: NURSE PRACTITIONER

## 2023-01-07 PROCEDURE — APPSS30 APP SPLIT SHARED TIME 16-30 MINUTES: Performed by: NURSE PRACTITIONER

## 2023-01-07 PROCEDURE — 94640 AIRWAY INHALATION TREATMENT: CPT

## 2023-01-07 PROCEDURE — 84145 PROCALCITONIN (PCT): CPT

## 2023-01-07 PROCEDURE — 2580000003 HC RX 258: Performed by: NURSE PRACTITIONER

## 2023-01-07 PROCEDURE — 36415 COLL VENOUS BLD VENIPUNCTURE: CPT

## 2023-01-07 RX ORDER — AZITHROMYCIN 250 MG/1
500 TABLET, FILM COATED ORAL DAILY
Status: COMPLETED | OUTPATIENT
Start: 2023-01-07 | End: 2023-01-09

## 2023-01-07 RX ORDER — METHYLPREDNISOLONE SODIUM SUCCINATE 40 MG/ML
40 INJECTION, POWDER, LYOPHILIZED, FOR SOLUTION INTRAMUSCULAR; INTRAVENOUS EVERY 8 HOURS
Status: DISCONTINUED | OUTPATIENT
Start: 2023-01-07 | End: 2023-01-09

## 2023-01-07 RX ORDER — INSULIN LISPRO 100 [IU]/ML
5 INJECTION, SOLUTION INTRAVENOUS; SUBCUTANEOUS
Status: DISCONTINUED | OUTPATIENT
Start: 2023-01-07 | End: 2023-01-10 | Stop reason: HOSPADM

## 2023-01-07 RX ORDER — HYDROCODONE BITARTRATE AND HOMATROPINE METHYLBROMIDE ORAL SOLUTION 5; 1.5 MG/5ML; MG/5ML
5 LIQUID ORAL EVERY 4 HOURS PRN
Status: DISCONTINUED | OUTPATIENT
Start: 2023-01-07 | End: 2023-01-10 | Stop reason: HOSPADM

## 2023-01-07 RX ORDER — OXYMETAZOLINE HYDROCHLORIDE 0.05 G/100ML
2 SPRAY NASAL 2 TIMES DAILY
Status: COMPLETED | OUTPATIENT
Start: 2023-01-07 | End: 2023-01-08

## 2023-01-07 RX ADMIN — AZITHROMYCIN 500 MG: 250 TABLET, FILM COATED ORAL at 12:02

## 2023-01-07 RX ADMIN — HYDROCODONE BITARTRATE AND HOMATROPINE METHYLBROMIDE 5 ML: 5; 1.5 SOLUTION ORAL at 20:50

## 2023-01-07 RX ADMIN — INSULIN LISPRO 4 UNITS: 100 INJECTION, SOLUTION INTRAVENOUS; SUBCUTANEOUS at 10:43

## 2023-01-07 RX ADMIN — HYDROCODONE BITARTRATE AND HOMATROPINE METHYLBROMIDE 5 ML: 5; 1.5 SOLUTION ORAL at 15:47

## 2023-01-07 RX ADMIN — INSULIN LISPRO 4 UNITS: 100 INJECTION, SOLUTION INTRAVENOUS; SUBCUTANEOUS at 05:32

## 2023-01-07 RX ADMIN — BUDESONIDE 500 MCG: 0.5 SUSPENSION RESPIRATORY (INHALATION) at 09:54

## 2023-01-07 RX ADMIN — INSULIN LISPRO 6 UNITS: 100 INJECTION, SOLUTION INTRAVENOUS; SUBCUTANEOUS at 15:48

## 2023-01-07 RX ADMIN — WATER 1000 MG: 1 INJECTION INTRAMUSCULAR; INTRAVENOUS; SUBCUTANEOUS at 20:50

## 2023-01-07 RX ADMIN — IPRATROPIUM BROMIDE AND ALBUTEROL SULFATE 1 AMPULE: .5; 2.5 SOLUTION RESPIRATORY (INHALATION) at 16:23

## 2023-01-07 RX ADMIN — IPRATROPIUM BROMIDE AND ALBUTEROL SULFATE 1 AMPULE: .5; 2.5 SOLUTION RESPIRATORY (INHALATION) at 12:59

## 2023-01-07 RX ADMIN — SODIUM CHLORIDE, PRESERVATIVE FREE 10 ML: 5 INJECTION INTRAVENOUS at 20:51

## 2023-01-07 RX ADMIN — BUDESONIDE 500 MCG: 0.5 SUSPENSION RESPIRATORY (INHALATION) at 20:26

## 2023-01-07 RX ADMIN — PANTOPRAZOLE SODIUM 40 MG: 40 TABLET, DELAYED RELEASE ORAL at 05:31

## 2023-01-07 RX ADMIN — OXYMETAZOLINE HCL 2 SPRAY: 0.05 SPRAY NASAL at 20:49

## 2023-01-07 RX ADMIN — INSULIN LISPRO 5 UNITS: 100 INJECTION, SOLUTION INTRAVENOUS; SUBCUTANEOUS at 15:48

## 2023-01-07 RX ADMIN — SERTRALINE 50 MG: 50 TABLET, FILM COATED ORAL at 08:00

## 2023-01-07 RX ADMIN — IPRATROPIUM BROMIDE AND ALBUTEROL SULFATE 1 AMPULE: .5; 2.5 SOLUTION RESPIRATORY (INHALATION) at 09:54

## 2023-01-07 RX ADMIN — DOXYCYCLINE HYCLATE 100 MG: 100 CAPSULE ORAL at 07:59

## 2023-01-07 RX ADMIN — LISINOPRIL 20 MG: 10 TABLET ORAL at 07:59

## 2023-01-07 RX ADMIN — INSULIN GLARGINE 15 UNITS: 100 INJECTION, SOLUTION SUBCUTANEOUS at 21:00

## 2023-01-07 RX ADMIN — METHYLPREDNISOLONE SODIUM SUCCINATE 40 MG: 40 INJECTION, POWDER, FOR SOLUTION INTRAMUSCULAR; INTRAVENOUS at 08:00

## 2023-01-07 RX ADMIN — OXYMETAZOLINE HCL 2 SPRAY: 0.05 SPRAY NASAL at 12:02

## 2023-01-07 RX ADMIN — ARFORMOTEROL TARTRATE 15 MCG: 15 SOLUTION RESPIRATORY (INHALATION) at 20:26

## 2023-01-07 RX ADMIN — METHYLPREDNISOLONE SODIUM SUCCINATE 40 MG: 40 INJECTION, POWDER, LYOPHILIZED, FOR SOLUTION INTRAMUSCULAR; INTRAVENOUS at 15:47

## 2023-01-07 RX ADMIN — ATORVASTATIN CALCIUM 10 MG: 10 TABLET, FILM COATED ORAL at 20:50

## 2023-01-07 RX ADMIN — IPRATROPIUM BROMIDE AND ALBUTEROL SULFATE 1 AMPULE: .5; 2.5 SOLUTION RESPIRATORY (INHALATION) at 20:26

## 2023-01-07 RX ADMIN — AMLODIPINE BESYLATE 5 MG: 5 TABLET ORAL at 07:59

## 2023-01-07 RX ADMIN — ARFORMOTEROL TARTRATE 15 MCG: 15 SOLUTION RESPIRATORY (INHALATION) at 09:54

## 2023-01-07 RX ADMIN — BENZONATATE 200 MG: 100 CAPSULE ORAL at 05:31

## 2023-01-07 RX ADMIN — HYDROCODONE BITARTRATE AND HOMATROPINE METHYLBROMIDE 5 ML: 5; 1.5 SOLUTION ORAL at 08:03

## 2023-01-07 RX ADMIN — SODIUM CHLORIDE, PRESERVATIVE FREE 10 ML: 5 INJECTION INTRAVENOUS at 08:00

## 2023-01-07 RX ADMIN — ENOXAPARIN SODIUM 40 MG: 100 INJECTION SUBCUTANEOUS at 08:00

## 2023-01-07 NOTE — PROGRESS NOTES
[facility] Progress Note    Admitting Date and Time: 1/5/2023  5:22 PM  Admit Dx: COPD exacerbation ([facility] [J44.1]  Acute respiratory failure with hypoxia ([facility] [J96.01]  Pneumonia of right lower lobe due to infectious organism [J18.9]      Assessment:    Principal Problem:    Multifocal pneumonia  Active Problems:    Acute respiratory failure with hypoxia ([facility] [address]    Essential hypertension    COPD exacerbation ([facility]  Resolved Problems:    * No resolved hospital problems. *      Plan:  1. AECOPD  - hx of COPD, is to be on Trelegy, albuterol, spiriva, and Duo-nebs at home  Respiratory viral panel negative    Continue with IV Solu-medrol - remains very bronchospastic and changed to TID 1/7/23  Scheduled Duonebs, Pulmicort and brovana continue    2. PNA, unspecified organism  - strep PNA and legionella antigens are negative  Respiratory viral panel negative    CTA does show infiltrates RUL and BLLs  Continue CTX and changed doxy to azithromycin for antiinflammatory effect in COPD    3. Acute on chronic hypoxemic/hypercarbic respiratory failure  - ABG on admission showed mild hypercarbia PaCO2 of 58 and hypoxia, PaO2 of 75. Baseline she is on 2 LPM - states with any activity becoming breathless and more hypoxic. Continue treatment of above and wean o2 back to baseline  She is currently requiring 5 LPM and desaturates with activity    Repeat ambulatory testing prior to discharge to ensure she has appropriate directions for home O2 use. 4. DM with hyperglycemia  - A1c is 8.7%  Hyperglycemic with recent use of steroids. Medium dose sliding scale and add bedtime Lantus  Add pre-meal bolus Humalog    5. HTN  - continue Norvasc and lisinopril    6. HLD  - continue statin    7. Depression/Anxiety  - continue zoloft    8.  GERD  - continue PPI    VTE prophylaxis: Lovenox        Subjective:  Patient is being [facility] [J44.1]  Acute respiratory failure with hypoxia ([facility] [J96.01]  Pneumonia of right lower lobe due to infectious organism [J18.9]     Has been ill off and on since mid November. Treated outpatient with Abx, steroids. Gets better when on antibiotics, then seems to feel worse as soon as finished. This episode started 6 days ago - increased coughing, congestion, sputum, chest tightness and increased use of home nebs. Home PO2 in 70% range despite increased O2 flow rate    1/6/23 -> she is back to 3 LPM which is baseline. Feels better but still with moist cough and sputum. No fevers overnight. Still feels tight and wheezy. Discussed asking for pulm consult - she declines at this time    1/7/23 -> she desaturated to 84% on 3 LPM yesterday and becomes breathless with activity  Still very tight and wheezy. Coughing, she states from post nasal drainage making her gag. ROS: denies fever, chills, n/v, HA unless stated above.       insulin lispro  5 Units SubCUTAneous TID WC    methylPREDNISolone  40 mg IntraVENous Q8H    oxymetazoline  2 spray Each Nostril BID    azithromycin  500 mg Oral Daily    atorvastatin  10 mg Oral Nightly    arformoterol tartrate  15 mcg Nebulization BID    And    budesonide  0.5 mg Nebulization BID    insulin lispro  0-8 Units SubCUTAneous TID WC    insulin lispro  0-4 Units SubCUTAneous Nightly    insulin glargine  15 Units SubCUTAneous Nightly    sodium chloride flush  5-40 mL IntraVENous 2 times per day    enoxaparin  40 mg SubCUTAneous Daily    ipratropium-albuterol  1 ampule Inhalation Q4H WA    cefTRIAXone (ROCEPHIN) IV  1,000 mg IntraVENous Q24H    amLODIPine  5 mg Oral Daily    lisinopril  20 mg Oral Daily    pantoprazole  40 mg Oral QAM AC    sertraline  50 mg Oral Daily     HYDROcodone homatropine, 5 mL, Q4H PRN  benzonatate, 200 mg, TID PRN  sodium chloride flush, 5-40 mL, PRN  sodium chloride, , PRN  ondansetron, 4 mg, Q8H PRN   Or  ondansetron, 4 mg, Q6H PRN  polyethylene glycol, 17 g, Daily PRN  acetaminophen, 650 mg, Q6H PRN Or  acetaminophen, 650 mg, Q6H PRN  glucose, 4 tablet, PRN  dextrose bolus, 125 mL, PRN   Or  dextrose bolus, 250 mL, PRN  glucagon (rDNA), 1 mg, PRN  dextrose, , Continuous PRN       Objective:    BP (!) 159/68   Pulse (!) 101   Temp 97.5 °F (36.4 °C) (Oral)   Resp 20   Ht 5' 5\" (1.651 m)   Wt 219 lb 8 oz (99.6 kg)   SpO2 93% Comment: pt just returned from restroom  BMI 36.53 kg/m²     General Appearance: comfortable in bed, back to 3 LPM NC. No acute distress  Skin: warm and dry  Head: normocephalic and atraumatic  Eyes: pupils equal, round, and reactive to light, extraocular eye movements intact, conjunctivae normal  Neck: neck supple and non tender without mass   Pulmonary/Chest: good air entry, bilateral expiratory wheezing noted. Cardiovascular: normal rate, normal S1 and S2 and no carotid bruits  Abdomen: soft, non-tender, non-distended, normal bowel sounds, no masses or organomegaly  Extremities: no cyanosis, no clubbing and no edema  Neurologic: no cranial nerve deficit and speech normal        Recent Labs     01/05/23  1346 01/06/23  0500 01/07/23  0300    137 136   K 4.4 4.7 4.7   CL 94* 95* 93*   CO2 34* 35* 34*   BUN 25* 24* 32*   CREATININE 0.7 0.6 0.7   GLUCOSE 370* 356* 327*   CALCIUM 9.1 8.8 9.5         Recent Labs     01/05/23  1346 01/06/23  0424 01/07/23  0300   WBC 11.3 7.3 8.3   RBC 3.97 3.68 3.82   HGB 12.3 11.5 12.0   HCT 41.3 38.6 39.3   .0* 104.9* 102.9*   MCH 31.0 31.3 31.4   MCHC 29.8* 29.8* 30.5*   RDW 14.2 13.9 14.3    193 209   MPV 11.0 11.4 11.1         Radiology:     CTA CHEST 1/5/23  FINDINGS:   Pulmonary Arteries: Pulmonary arteries are adequately opacified for evaluation. No evidence of intraluminal filling defect to suggest pulmonary embolism. Main pulmonary artery is normal in caliber. Mediastinum: No evidence of mediastinal lymphadenopathy. The heart and pericardium demonstrate no acute abnormality.   Moderate coronary artery calcifications are seen. There is no acute abnormality of the thoracic aorta. Lungs/pleura: Moderate centrilobular and paraseptal emphysema is noted. Posterior right upper lobe and bilateral lower lobe basilar peripheral patchy airspace opacities are seen with associated ground-glass density and airway wall thickening. No pleural effusion or pneumothorax seen. Upper Abdomen: Diffusely decreased hepatic attenuation is noted suggestive of fatty infiltration. Soft Tissues/Bones: No acute bone or soft tissue abnormality. NOTE: This report was transcribed using voice recognition software. Every effort was made to ensure accuracy; however, inadvertent computerized transcription errors may be present.   Electronically signed by LINWOOD Schneider CNP on 1/7/2023 at 11:47 AM

## 2023-01-08 LAB
ANION GAP SERPL CALCULATED.3IONS-SCNC: 9 MMOL/L (ref 7–16)
BASOPHILS ABSOLUTE: 0.01 E9/L (ref 0–0.2)
BASOPHILS RELATIVE PERCENT: 0.2 % (ref 0–2)
BUN BLDV-MCNC: 34 MG/DL (ref 6–23)
CALCIUM SERPL-MCNC: 9.5 MG/DL (ref 8.6–10.2)
CHLORIDE BLD-SCNC: 93 MMOL/L (ref 98–107)
CO2: 35 MMOL/L (ref 22–29)
CREAT SERPL-MCNC: 0.8 MG/DL (ref 0.5–1)
EOSINOPHILS ABSOLUTE: 0 E9/L (ref 0.05–0.5)
EOSINOPHILS RELATIVE PERCENT: 0 % (ref 0–6)
FOLATE: 13.9 NG/ML (ref 4.8–24.2)
GFR SERPL CREATININE-BSD FRML MDRD: >60 ML/MIN/1.73
GLUCOSE BLD-MCNC: 296 MG/DL (ref 74–99)
HCT VFR BLD CALC: 39.1 % (ref 34–48)
HEMOGLOBIN: 11.7 G/DL (ref 11.5–15.5)
IMMATURE GRANULOCYTES #: 0.07 E9/L
IMMATURE GRANULOCYTES %: 1.1 % (ref 0–5)
LYMPHOCYTES ABSOLUTE: 0.68 E9/L (ref 1.5–4)
LYMPHOCYTES RELATIVE PERCENT: 10.8 % (ref 20–42)
MCH RBC QN AUTO: 31 PG (ref 26–35)
MCHC RBC AUTO-ENTMCNC: 29.9 % (ref 32–34.5)
MCV RBC AUTO: 103.7 FL (ref 80–99.9)
METER GLUCOSE: 226 MG/DL (ref 74–99)
METER GLUCOSE: 273 MG/DL (ref 74–99)
METER GLUCOSE: 309 MG/DL (ref 74–99)
METER GLUCOSE: 329 MG/DL (ref 74–99)
MONOCYTES ABSOLUTE: 0.19 E9/L (ref 0.1–0.95)
MONOCYTES RELATIVE PERCENT: 3 % (ref 2–12)
NEUTROPHILS ABSOLUTE: 5.34 E9/L (ref 1.8–7.3)
NEUTROPHILS RELATIVE PERCENT: 84.9 % (ref 43–80)
PDW BLD-RTO: 14.2 FL (ref 11.5–15)
PLATELET # BLD: 205 E9/L (ref 130–450)
PMV BLD AUTO: 10.9 FL (ref 7–12)
POTASSIUM SERPL-SCNC: 5 MMOL/L (ref 3.5–5)
RBC # BLD: 3.77 E12/L (ref 3.5–5.5)
SODIUM BLD-SCNC: 137 MMOL/L (ref 132–146)
VITAMIN B-12: 674 PG/ML (ref 211–946)
WBC # BLD: 6.3 E9/L (ref 4.5–11.5)

## 2023-01-08 PROCEDURE — 94640 AIRWAY INHALATION TREATMENT: CPT

## 2023-01-08 PROCEDURE — 6370000000 HC RX 637 (ALT 250 FOR IP): Performed by: NURSE PRACTITIONER

## 2023-01-08 PROCEDURE — 85025 COMPLETE CBC W/AUTO DIFF WBC: CPT

## 2023-01-08 PROCEDURE — 82746 ASSAY OF FOLIC ACID SERUM: CPT

## 2023-01-08 PROCEDURE — 82962 GLUCOSE BLOOD TEST: CPT

## 2023-01-08 PROCEDURE — 80048 BASIC METABOLIC PNL TOTAL CA: CPT

## 2023-01-08 PROCEDURE — 99231 SBSQ HOSP IP/OBS SF/LOW 25: CPT | Performed by: STUDENT IN AN ORGANIZED HEALTH CARE EDUCATION/TRAINING PROGRAM

## 2023-01-08 PROCEDURE — 6360000002 HC RX W HCPCS: Performed by: NURSE PRACTITIONER

## 2023-01-08 PROCEDURE — 2700000000 HC OXYGEN THERAPY PER DAY

## 2023-01-08 PROCEDURE — 82607 VITAMIN B-12: CPT

## 2023-01-08 PROCEDURE — APPSS30 APP SPLIT SHARED TIME 16-30 MINUTES: Performed by: NURSE PRACTITIONER

## 2023-01-08 PROCEDURE — 2580000003 HC RX 258: Performed by: NURSE PRACTITIONER

## 2023-01-08 PROCEDURE — 2060000000 HC ICU INTERMEDIATE R&B

## 2023-01-08 PROCEDURE — 36415 COLL VENOUS BLD VENIPUNCTURE: CPT

## 2023-01-08 RX ORDER — INSULIN GLARGINE 100 [IU]/ML
20 INJECTION, SOLUTION SUBCUTANEOUS NIGHTLY
Status: DISCONTINUED | OUTPATIENT
Start: 2023-01-08 | End: 2023-01-10 | Stop reason: HOSPADM

## 2023-01-08 RX ADMIN — SODIUM CHLORIDE, PRESERVATIVE FREE 10 ML: 5 INJECTION INTRAVENOUS at 08:09

## 2023-01-08 RX ADMIN — METHYLPREDNISOLONE SODIUM SUCCINATE 40 MG: 40 INJECTION, POWDER, LYOPHILIZED, FOR SOLUTION INTRAMUSCULAR; INTRAVENOUS at 00:16

## 2023-01-08 RX ADMIN — METHYLPREDNISOLONE SODIUM SUCCINATE 40 MG: 40 INJECTION, POWDER, LYOPHILIZED, FOR SOLUTION INTRAMUSCULAR; INTRAVENOUS at 08:05

## 2023-01-08 RX ADMIN — SODIUM CHLORIDE, PRESERVATIVE FREE 10 ML: 5 INJECTION INTRAVENOUS at 20:40

## 2023-01-08 RX ADMIN — LISINOPRIL 20 MG: 10 TABLET ORAL at 08:06

## 2023-01-08 RX ADMIN — OXYMETAZOLINE HCL 2 SPRAY: 0.05 SPRAY NASAL at 21:02

## 2023-01-08 RX ADMIN — BUDESONIDE 500 MCG: 0.5 SUSPENSION RESPIRATORY (INHALATION) at 19:40

## 2023-01-08 RX ADMIN — ARFORMOTEROL TARTRATE 15 MCG: 15 SOLUTION RESPIRATORY (INHALATION) at 19:40

## 2023-01-08 RX ADMIN — INSULIN LISPRO 5 UNITS: 100 INJECTION, SOLUTION INTRAVENOUS; SUBCUTANEOUS at 16:08

## 2023-01-08 RX ADMIN — INSULIN LISPRO 4 UNITS: 100 INJECTION, SOLUTION INTRAVENOUS; SUBCUTANEOUS at 20:43

## 2023-01-08 RX ADMIN — INSULIN LISPRO 5 UNITS: 100 INJECTION, SOLUTION INTRAVENOUS; SUBCUTANEOUS at 11:22

## 2023-01-08 RX ADMIN — IPRATROPIUM BROMIDE AND ALBUTEROL SULFATE 1 AMPULE: .5; 2.5 SOLUTION RESPIRATORY (INHALATION) at 07:47

## 2023-01-08 RX ADMIN — PANTOPRAZOLE SODIUM 40 MG: 40 TABLET, DELAYED RELEASE ORAL at 05:55

## 2023-01-08 RX ADMIN — ENOXAPARIN SODIUM 40 MG: 100 INJECTION SUBCUTANEOUS at 08:06

## 2023-01-08 RX ADMIN — AMLODIPINE BESYLATE 5 MG: 5 TABLET ORAL at 08:06

## 2023-01-08 RX ADMIN — AZITHROMYCIN 500 MG: 250 TABLET, FILM COATED ORAL at 08:05

## 2023-01-08 RX ADMIN — SODIUM CHLORIDE, PRESERVATIVE FREE 10 ML: 5 INJECTION INTRAVENOUS at 16:07

## 2023-01-08 RX ADMIN — BUDESONIDE 500 MCG: 0.5 SUSPENSION RESPIRATORY (INHALATION) at 07:47

## 2023-01-08 RX ADMIN — HYDROCODONE BITARTRATE AND HOMATROPINE METHYLBROMIDE 5 ML: 5; 1.5 SOLUTION ORAL at 05:53

## 2023-01-08 RX ADMIN — INSULIN LISPRO 6 UNITS: 100 INJECTION, SOLUTION INTRAVENOUS; SUBCUTANEOUS at 11:21

## 2023-01-08 RX ADMIN — IPRATROPIUM BROMIDE AND ALBUTEROL SULFATE 1 AMPULE: .5; 2.5 SOLUTION RESPIRATORY (INHALATION) at 19:40

## 2023-01-08 RX ADMIN — HYDROCODONE BITARTRATE AND HOMATROPINE METHYLBROMIDE 5 ML: 5; 1.5 SOLUTION ORAL at 20:50

## 2023-01-08 RX ADMIN — ATORVASTATIN CALCIUM 10 MG: 10 TABLET, FILM COATED ORAL at 20:36

## 2023-01-08 RX ADMIN — SERTRALINE 50 MG: 50 TABLET, FILM COATED ORAL at 08:06

## 2023-01-08 RX ADMIN — HYDROCODONE BITARTRATE AND HOMATROPINE METHYLBROMIDE 5 ML: 5; 1.5 SOLUTION ORAL at 13:22

## 2023-01-08 RX ADMIN — ARFORMOTEROL TARTRATE 15 MCG: 15 SOLUTION RESPIRATORY (INHALATION) at 07:47

## 2023-01-08 RX ADMIN — INSULIN LISPRO 2 UNITS: 100 INJECTION, SOLUTION INTRAVENOUS; SUBCUTANEOUS at 16:08

## 2023-01-08 RX ADMIN — INSULIN GLARGINE 20 UNITS: 100 INJECTION, SOLUTION SUBCUTANEOUS at 20:43

## 2023-01-08 RX ADMIN — INSULIN LISPRO 4 UNITS: 100 INJECTION, SOLUTION INTRAVENOUS; SUBCUTANEOUS at 06:03

## 2023-01-08 RX ADMIN — METHYLPREDNISOLONE SODIUM SUCCINATE 40 MG: 40 INJECTION, POWDER, LYOPHILIZED, FOR SOLUTION INTRAMUSCULAR; INTRAVENOUS at 16:07

## 2023-01-08 RX ADMIN — IPRATROPIUM BROMIDE AND ALBUTEROL SULFATE 1 AMPULE: .5; 2.5 SOLUTION RESPIRATORY (INHALATION) at 12:08

## 2023-01-08 RX ADMIN — IPRATROPIUM BROMIDE AND ALBUTEROL SULFATE 1 AMPULE: .5; 2.5 SOLUTION RESPIRATORY (INHALATION) at 15:50

## 2023-01-08 RX ADMIN — WATER 1000 MG: 1 INJECTION INTRAMUSCULAR; INTRAVENOUS; SUBCUTANEOUS at 20:36

## 2023-01-08 RX ADMIN — OXYMETAZOLINE HCL 2 SPRAY: 0.05 SPRAY NASAL at 08:08

## 2023-01-08 RX ADMIN — INSULIN LISPRO 5 UNITS: 100 INJECTION, SOLUTION INTRAVENOUS; SUBCUTANEOUS at 06:03

## 2023-01-08 NOTE — PROGRESS NOTES
[facility] Progress Note    Admitting Date and Time: 1/5/2023  5:22 PM  Admit Dx: COPD exacerbation ([facility] [J44.1]  Acute respiratory failure with hypoxia ([facility] [J96.01]  Pneumonia of right lower lobe due to infectious organism [J18.9]      Assessment:    Principal Problem:    Multifocal pneumonia  Active Problems:    Acute respiratory failure with hypoxia ([facility]    Essential hypertension    COPD exacerbation ([facility]  Resolved Problems:    * No resolved hospital problems. *      Plan:  1. AECOPD  - hx of COPD, is to be on Trelegy, albuterol, spiriva, and Duo-nebs at home  Respiratory viral panel negative    Continue with IV Solu-medrol - changed to TID 1/7/23, maintain same today and start taper tomorrow  Scheduled Duonebs, Pulmicort and brovana continue    2. PNA, unspecified organism  - strep PNA and legionella antigens are negative  Respiratory viral panel negative    CTA does show infiltrates RUL and BLLs  Continue CTX and changed doxy to azithromycin for antiinflammatory effect in COPD    3. Acute on chronic hypoxemic/hypercarbic respiratory failure  - ABG on admission showed mild hypercarbia PaCO2 of 58 and hypoxia, PaO2 of 75. Baseline she is on 2 LPM - states with any activity becoming breathless and more hypoxic. Continue treatment of above and wean o2 back to baseline  She is currently requiring 4-5 LPM and desaturates with activity    Repeat ambulatory testing prior to discharge to ensure she has appropriate directions for home O2 use. 4. DM with hyperglycemia  - A1c is 8.7%  Hyperglycemic with recent use of steroids. Medium dose sliding scale and add bedtime Lantus  Add pre-meal bolus Humalog    5. HTN  - continue Norvasc and lisinopril    6. HLD  - continue statin    7. Depression/Anxiety  - continue zoloft    8.  GERD  - continue PPI    VTE prophylaxis: Lovenox        Subjective:  Patient is being followed for COPD exacerbation ([facility] [J44.1]  Acute respiratory failure with hypoxia ([facility] [J96.01]  Pneumonia of right lower lobe due to infectious organism [J18.9]     Has been ill off and on since mid November. Treated outpatient with Abx, steroids. Gets better when on antibiotics, then seems to feel worse as soon as finished. This episode started 6 days ago - increased coughing, congestion, sputum, chest tightness and increased use of home nebs. Home PO2 in 70% range despite increased O2 flow rate    1/6/23 -> she is back to 3 LPM which is baseline. Feels better but still with moist cough and sputum. No fevers overnight. Still feels tight and wheezy. Discussed asking for pulm consult - she declines at this time    1/7/23 -> she desaturated to 84% on 3 LPM yesterday and becomes breathless with activity  Still very tight and wheezy. Coughing, she states from post nasal drainage making her gag. 1/8/23 -> she is feeling better. Less congested and less coughing. She has been up to restroom and feels less dyspneic with activity    ROS: denies fever, chills, n/v, HA unless stated above.       insulin glargine  20 Units SubCUTAneous Nightly    insulin lispro  5 Units SubCUTAneous TID WC    methylPREDNISolone  40 mg IntraVENous Q8H    oxymetazoline  2 spray Each Nostril BID    azithromycin  500 mg Oral Daily    atorvastatin  10 mg Oral Nightly    arformoterol tartrate  15 mcg Nebulization BID    And    budesonide  0.5 mg Nebulization BID    insulin lispro  0-8 Units SubCUTAneous TID WC    insulin lispro  0-4 Units SubCUTAneous Nightly    sodium chloride flush  5-40 mL IntraVENous 2 times per day    enoxaparin  40 mg SubCUTAneous Daily    ipratropium-albuterol  1 ampule Inhalation Q4H WA    cefTRIAXone (ROCEPHIN) IV  1,000 mg IntraVENous Q24H    amLODIPine  5 mg Oral Daily    lisinopril  20 mg Oral Daily    pantoprazole  40 mg Oral QAM AC    sertraline  50 mg Oral Daily     HYDROcodone homatropine, 5 mL, Q4H PRN  benzonatate, 200 mg, TID PRN  sodium chloride flush, 5-40 mL, PRN  sodium chloride, , PRN  ondansetron, 4 mg, Q8H PRN   Or  ondansetron, 4 mg, Q6H PRN  polyethylene glycol, 17 g, Daily PRN  acetaminophen, 650 mg, Q6H PRN   Or  acetaminophen, 650 mg, Q6H PRN  glucose, 4 tablet, PRN  dextrose bolus, 125 mL, PRN   Or  dextrose bolus, 250 mL, PRN  glucagon (rDNA), 1 mg, PRN  dextrose, , Continuous PRN       Objective:    /76   Pulse 73   Temp 98.1 °F (36.7 °C) (Oral)   Resp 18   Ht 5' 5\" (1.651 m)   Wt 220 lb 9.6 oz (100.1 kg)   SpO2 97%   BMI 36.71 kg/m²     General Appearance: comfortable in bed, back to 3 LPM NC. No acute distress  Skin: warm and dry  Head: normocephalic and atraumatic  Eyes: pupils equal, round, and reactive to light, extraocular eye movements intact, conjunctivae normal  Neck: neck supple and non tender without mass   Pulmonary/Chest: wheezing is much improved  Cardiovascular: normal rate, normal S1 and S2 and no carotid bruits  Abdomen: soft, non-tender, non-distended, normal bowel sounds, no masses or organomegaly  Extremities: no cyanosis, no clubbing and no edema  Neurologic: no cranial nerve deficit and speech normal        Recent Labs     01/06/23  0500 01/07/23  0300 01/08/23  0240    136 137   K 4.7 4.7 5.0   CL 95* 93* 93*   CO2 35* 34* 35*   BUN 24* 32* 34*   CREATININE 0.6 0.7 0.8   GLUCOSE 356* 327* 296*   CALCIUM 8.8 9.5 9.5         Recent Labs     01/06/23  0424 01/07/23  0300 01/08/23  0240   WBC 7.3 8.3 6.3   RBC 3.68 3.82 3.77   HGB 11.5 12.0 11.7   HCT 38.6 39.3 39.1   .9* 102.9* 103.7*   MCH 31.3 31.4 31.0   MCHC 29.8* 30.5* 29.9*   RDW 13.9 14.3 14.2    209 205   MPV 11.4 11.1 10.9         Radiology:     CTA CHEST 1/5/23  FINDINGS:   Pulmonary Arteries: Pulmonary arteries are adequately opacified for evaluation. No evidence of intraluminal filling defect to suggest pulmonary embolism. Main pulmonary artery is normal in caliber. Mediastinum: No evidence of mediastinal lymphadenopathy.   The heart and pericardium demonstrate no acute abnormality. Moderate coronary artery calcifications are seen. There is no acute abnormality of the thoracic aorta. Lungs/pleura: Moderate centrilobular and paraseptal emphysema is noted. Posterior right upper lobe and bilateral lower lobe basilar peripheral patchy airspace opacities are seen with associated ground-glass density and airway wall thickening. No pleural effusion or pneumothorax seen. Upper Abdomen: Diffusely decreased hepatic attenuation is noted suggestive of fatty infiltration. Soft Tissues/Bones: No acute bone or soft tissue abnormality. NOTE: This report was transcribed using voice recognition software. Every effort was made to ensure accuracy; however, inadvertent computerized transcription errors may be present.   Electronically signed by LINWOOD Nevarez CNP on 1/8/2023 at 11:59 AM

## 2023-01-09 LAB
ANION GAP SERPL CALCULATED.3IONS-SCNC: 7 MMOL/L (ref 7–16)
BASOPHILS ABSOLUTE: 0.01 E9/L (ref 0–0.2)
BASOPHILS RELATIVE PERCENT: 0.2 % (ref 0–2)
BUN BLDV-MCNC: 34 MG/DL (ref 6–23)
CALCIUM SERPL-MCNC: 9.3 MG/DL (ref 8.6–10.2)
CHLORIDE BLD-SCNC: 93 MMOL/L (ref 98–107)
CO2: 35 MMOL/L (ref 22–29)
CREAT SERPL-MCNC: 0.8 MG/DL (ref 0.5–1)
EOSINOPHILS ABSOLUTE: 0 E9/L (ref 0.05–0.5)
EOSINOPHILS RELATIVE PERCENT: 0 % (ref 0–6)
GFR SERPL CREATININE-BSD FRML MDRD: >60 ML/MIN/1.73
GLUCOSE BLD-MCNC: 299 MG/DL (ref 74–99)
HCT VFR BLD CALC: 41.3 % (ref 34–48)
HEMOGLOBIN: 12.6 G/DL (ref 11.5–15.5)
IMMATURE GRANULOCYTES #: 0.1 E9/L
IMMATURE GRANULOCYTES %: 1.6 % (ref 0–5)
LYMPHOCYTES ABSOLUTE: 0.74 E9/L (ref 1.5–4)
LYMPHOCYTES RELATIVE PERCENT: 11.8 % (ref 20–42)
MCH RBC QN AUTO: 31.3 PG (ref 26–35)
MCHC RBC AUTO-ENTMCNC: 30.5 % (ref 32–34.5)
MCV RBC AUTO: 102.5 FL (ref 80–99.9)
METER GLUCOSE: 252 MG/DL (ref 74–99)
METER GLUCOSE: 267 MG/DL (ref 74–99)
METER GLUCOSE: 270 MG/DL (ref 74–99)
METER GLUCOSE: 278 MG/DL (ref 74–99)
MONOCYTES ABSOLUTE: 0.2 E9/L (ref 0.1–0.95)
MONOCYTES RELATIVE PERCENT: 3.2 % (ref 2–12)
NEUTROPHILS ABSOLUTE: 5.22 E9/L (ref 1.8–7.3)
NEUTROPHILS RELATIVE PERCENT: 83.2 % (ref 43–80)
PDW BLD-RTO: 14.1 FL (ref 11.5–15)
PLATELET # BLD: 214 E9/L (ref 130–450)
PMV BLD AUTO: 10.7 FL (ref 7–12)
POTASSIUM SERPL-SCNC: 5.1 MMOL/L (ref 3.5–5)
RBC # BLD: 4.03 E12/L (ref 3.5–5.5)
SODIUM BLD-SCNC: 135 MMOL/L (ref 132–146)
WBC # BLD: 6.3 E9/L (ref 4.5–11.5)

## 2023-01-09 PROCEDURE — 2060000000 HC ICU INTERMEDIATE R&B

## 2023-01-09 PROCEDURE — 85025 COMPLETE CBC W/AUTO DIFF WBC: CPT

## 2023-01-09 PROCEDURE — 2580000003 HC RX 258: Performed by: NURSE PRACTITIONER

## 2023-01-09 PROCEDURE — 6370000000 HC RX 637 (ALT 250 FOR IP): Performed by: NURSE PRACTITIONER

## 2023-01-09 PROCEDURE — 2700000000 HC OXYGEN THERAPY PER DAY

## 2023-01-09 PROCEDURE — 36415 COLL VENOUS BLD VENIPUNCTURE: CPT

## 2023-01-09 PROCEDURE — APPSS30 APP SPLIT SHARED TIME 16-30 MINUTES: Performed by: NURSE PRACTITIONER

## 2023-01-09 PROCEDURE — 6360000002 HC RX W HCPCS: Performed by: NURSE PRACTITIONER

## 2023-01-09 PROCEDURE — 82962 GLUCOSE BLOOD TEST: CPT

## 2023-01-09 PROCEDURE — 94640 AIRWAY INHALATION TREATMENT: CPT

## 2023-01-09 PROCEDURE — 80048 BASIC METABOLIC PNL TOTAL CA: CPT

## 2023-01-09 PROCEDURE — 99231 SBSQ HOSP IP/OBS SF/LOW 25: CPT | Performed by: STUDENT IN AN ORGANIZED HEALTH CARE EDUCATION/TRAINING PROGRAM

## 2023-01-09 RX ORDER — METHYLPREDNISOLONE SODIUM SUCCINATE 40 MG/ML
40 INJECTION, POWDER, LYOPHILIZED, FOR SOLUTION INTRAMUSCULAR; INTRAVENOUS EVERY 12 HOURS
Status: DISCONTINUED | OUTPATIENT
Start: 2023-01-09 | End: 2023-01-10 | Stop reason: HOSPADM

## 2023-01-09 RX ADMIN — SODIUM CHLORIDE, PRESERVATIVE FREE 10 ML: 5 INJECTION INTRAVENOUS at 20:00

## 2023-01-09 RX ADMIN — HYDROCODONE BITARTRATE AND HOMATROPINE METHYLBROMIDE 5 ML: 5; 1.5 SOLUTION ORAL at 21:50

## 2023-01-09 RX ADMIN — BUDESONIDE 500 MCG: 0.5 SUSPENSION RESPIRATORY (INHALATION) at 08:11

## 2023-01-09 RX ADMIN — BUDESONIDE 500 MCG: 0.5 SUSPENSION RESPIRATORY (INHALATION) at 20:30

## 2023-01-09 RX ADMIN — ENOXAPARIN SODIUM 40 MG: 100 INJECTION SUBCUTANEOUS at 08:28

## 2023-01-09 RX ADMIN — ATORVASTATIN CALCIUM 10 MG: 10 TABLET, FILM COATED ORAL at 20:00

## 2023-01-09 RX ADMIN — LISINOPRIL 20 MG: 10 TABLET ORAL at 08:27

## 2023-01-09 RX ADMIN — SERTRALINE 50 MG: 50 TABLET, FILM COATED ORAL at 08:27

## 2023-01-09 RX ADMIN — INSULIN LISPRO 4 UNITS: 100 INJECTION, SOLUTION INTRAVENOUS; SUBCUTANEOUS at 15:31

## 2023-01-09 RX ADMIN — INSULIN LISPRO 4 UNITS: 100 INJECTION, SOLUTION INTRAVENOUS; SUBCUTANEOUS at 06:24

## 2023-01-09 RX ADMIN — IPRATROPIUM BROMIDE AND ALBUTEROL SULFATE 1 AMPULE: .5; 2.5 SOLUTION RESPIRATORY (INHALATION) at 08:11

## 2023-01-09 RX ADMIN — METHYLPREDNISOLONE SODIUM SUCCINATE 40 MG: 40 INJECTION, POWDER, LYOPHILIZED, FOR SOLUTION INTRAMUSCULAR; INTRAVENOUS at 20:00

## 2023-01-09 RX ADMIN — ARFORMOTEROL TARTRATE 15 MCG: 15 SOLUTION RESPIRATORY (INHALATION) at 08:11

## 2023-01-09 RX ADMIN — INSULIN LISPRO 5 UNITS: 100 INJECTION, SOLUTION INTRAVENOUS; SUBCUTANEOUS at 15:31

## 2023-01-09 RX ADMIN — HYDROCODONE BITARTRATE AND HOMATROPINE METHYLBROMIDE 5 ML: 5; 1.5 SOLUTION ORAL at 06:20

## 2023-01-09 RX ADMIN — INSULIN LISPRO 5 UNITS: 100 INJECTION, SOLUTION INTRAVENOUS; SUBCUTANEOUS at 06:26

## 2023-01-09 RX ADMIN — SODIUM CHLORIDE, PRESERVATIVE FREE 10 ML: 5 INJECTION INTRAVENOUS at 08:30

## 2023-01-09 RX ADMIN — PANTOPRAZOLE SODIUM 40 MG: 40 TABLET, DELAYED RELEASE ORAL at 06:20

## 2023-01-09 RX ADMIN — WATER 1000 MG: 1 INJECTION INTRAMUSCULAR; INTRAVENOUS; SUBCUTANEOUS at 20:00

## 2023-01-09 RX ADMIN — INSULIN GLARGINE 20 UNITS: 100 INJECTION, SOLUTION SUBCUTANEOUS at 20:00

## 2023-01-09 RX ADMIN — ARFORMOTEROL TARTRATE 15 MCG: 15 SOLUTION RESPIRATORY (INHALATION) at 20:30

## 2023-01-09 RX ADMIN — METHYLPREDNISOLONE SODIUM SUCCINATE 40 MG: 40 INJECTION, POWDER, LYOPHILIZED, FOR SOLUTION INTRAMUSCULAR; INTRAVENOUS at 00:57

## 2023-01-09 RX ADMIN — INSULIN LISPRO 5 UNITS: 100 INJECTION, SOLUTION INTRAVENOUS; SUBCUTANEOUS at 10:42

## 2023-01-09 RX ADMIN — METHYLPREDNISOLONE SODIUM SUCCINATE 40 MG: 40 INJECTION, POWDER, LYOPHILIZED, FOR SOLUTION INTRAMUSCULAR; INTRAVENOUS at 08:27

## 2023-01-09 RX ADMIN — IPRATROPIUM BROMIDE AND ALBUTEROL SULFATE 1 AMPULE: .5; 2.5 SOLUTION RESPIRATORY (INHALATION) at 17:11

## 2023-01-09 RX ADMIN — IPRATROPIUM BROMIDE AND ALBUTEROL SULFATE 1 AMPULE: .5; 2.5 SOLUTION RESPIRATORY (INHALATION) at 12:15

## 2023-01-09 RX ADMIN — AZITHROMYCIN 500 MG: 250 TABLET, FILM COATED ORAL at 08:27

## 2023-01-09 RX ADMIN — IPRATROPIUM BROMIDE AND ALBUTEROL SULFATE 1 AMPULE: .5; 2.5 SOLUTION RESPIRATORY (INHALATION) at 20:30

## 2023-01-09 RX ADMIN — INSULIN LISPRO 4 UNITS: 100 INJECTION, SOLUTION INTRAVENOUS; SUBCUTANEOUS at 10:42

## 2023-01-09 RX ADMIN — AMLODIPINE BESYLATE 5 MG: 5 TABLET ORAL at 08:27

## 2023-01-09 ASSESSMENT — PAIN SCALES - GENERAL: PAINLEVEL_OUTOF10: 0

## 2023-01-09 NOTE — PROGRESS NOTES
Pulse ox was 95% on 3L at rest.   Ambulated patient on 3L. Oxygen saturation was 91% on 3L while ambulating.    Recovery pulse ox was 94% on 3 liters of oxygen while at rest.

## 2023-01-09 NOTE — PROGRESS NOTES
[facility] Progress Note    Admitting Date and Time: 1/5/2023  5:22 PM  Admit Dx: COPD exacerbation ([facility] [J44.1]  [facility] ([facility] [J96.01]  Pneumonia of right lower lobe due to infectious organism [J18.9]      Assessment:    Principal Problem:    Multifocal pneumonia  Active Problems:    Acute respiratory failure with hypoxia ([facility]    Essential hypertension    COPD exacerbation ([facility]  Resolved Problems:    * No resolved hospital problems. *      Plan:  1. AECOPD  - hx of COPD, is to be on Trelegy, albuterol, spiriva, and Duo-nebs at home  Respiratory viral panel negative    Continue with IV Solu-medrol - changed to TID 1/7/23, reduced back to BID on 1/9, plan transition to prednisone and discharge tomorrow    Scheduled Duonebs, Pulmicort and brovana continue -> bronchospasm is much improved    2. PNA, unspecified organism  - strep PNA and legionella antigens are negative  Respiratory viral panel negative    CTA does show infiltrates RUL and BLLs  Continue CTX, day #4  Completed 3 doses azithromycin for antiinflammatory effect in COPD    3. Acute on chronic hypoxemic/hypercarbic respiratory failure  - ABG on admission showed mild hypercarbia PaCO2 of 58 and hypoxia, PaO2 of 75. Baseline she is on 2 LPM -   Continue treatment of above and wean o2 back to baseline  She is currently requiring 3 LPM     4. DM with hyperglycemia  - A1c is 8.7%  Hyperglycemic with recent use of steroids. Medium dose sliding scale and add bedtime Lantus  Add pre-meal bolus Humalog    5. HTN  - continue Norvasc and lisinopril    6. HLD  - continue statin    7. Depression/Anxiety  - continue zoloft    8.  GERD  - continue PPI    VTE prophylaxis: Lovenox        Subjective:  Patient is being followed for COPD exacerbation ([facility] [J44.1]  Acute respiratory failure with hypoxia ([facility] [J96.01]  Pneumonia of right lower lobe due to infectious organism [J18.9]     Has been ill off and on since mid November. Treated outpatient with Abx, steroids. Gets better when on antibiotics, then seems to feel worse as soon as finished. This episode started 6 days ago - increased coughing, congestion, sputum, chest tightness and increased use of home nebs. Home PO2 in 70% range despite increased O2 flow rate    1/6/23 -> she is back to 3 LPM which is baseline. Feels better but still with moist cough and sputum. No fevers overnight. Still feels tight and wheezy. Discussed asking for pulm consult - she declines at this time    1/7/23 -> she desaturated to 84% on 3 LPM yesterday and becomes breathless with activity  Still very tight and wheezy. Coughing, she states from post nasal drainage making her gag. 1/8/23 -> she is feeling better. Less congested and less coughing. She has been up to restroom and feels less dyspneic with activity    1/9 -> again feeling much better. Still on 3 LPM NC. States at home uses 2 LPM. Agreeable to one more day BID Solu-medrol and probable DC home tomorrow on prednisone taper. ROS: denies fever, chills, n/v, HA unless stated above.       methylPREDNISolone  40 mg IntraVENous Q12H    insulin glargine  20 Units SubCUTAneous Nightly    insulin lispro  5 Units SubCUTAneous TID WC    atorvastatin  10 mg Oral Nightly    arformoterol tartrate  15 mcg Nebulization BID    And    budesonide  0.5 mg Nebulization BID    insulin lispro  0-8 Units SubCUTAneous TID WC    insulin lispro  0-4 Units SubCUTAneous Nightly    sodium chloride flush  5-40 mL IntraVENous 2 times per day    enoxaparin  40 mg SubCUTAneous Daily    ipratropium-albuterol  1 ampule Inhalation Q4H WA    cefTRIAXone (ROCEPHIN) IV  1,000 mg IntraVENous Q24H    amLODIPine  5 mg Oral Daily    lisinopril  20 mg Oral Daily    pantoprazole  40 mg Oral QAM AC    sertraline  50 mg Oral Daily     HYDROcodone homatropine, 5 mL, Q4H PRN  benzonatate, 200 mg, TID PRN  sodium chloride flush, 5-40 mL, PRN  sodium chloride, , PRN  ondansetron, 4 mg, Q8H PRN   Or  ondansetron, 4 mg, Q6H PRN  polyethylene glycol, 17 g, Daily PRN  acetaminophen, 650 mg, Q6H PRN   Or  acetaminophen, 650 mg, Q6H PRN  glucose, 4 tablet, PRN  dextrose bolus, 125 mL, PRN   Or  dextrose bolus, 250 mL, PRN  glucagon (rDNA), 1 mg, PRN  dextrose, , Continuous PRN       Objective:    BP (!) 170/72   Pulse 74   Temp 98.2 °F (36.8 °C) (Oral)   Resp 18   Ht 5' 5\" (1.651 m)   Wt 219 lb (99.3 kg)   SpO2 96%   BMI 36.44 kg/m²     General Appearance: comfortable in bed, back to 3 LPM NC. No acute distress  Skin: warm and dry  Head: normocephalic and atraumatic  Eyes: pupils equal, round, and reactive to light, extraocular eye movements intact, conjunctivae normal  Neck: neck supple and non tender without mass   Pulmonary/Chest: wheezing is much improved, improved air entry  Cardiovascular: normal rate, normal S1 and S2 and no carotid bruits  Abdomen: soft, non-tender, non-distended, normal bowel sounds, no masses or organomegaly  Extremities: no cyanosis, no clubbing and no edema  Neurologic: no cranial nerve deficit and speech normal        Recent Labs     01/07/23  0300 01/08/23  0240 01/09/23  0630    137 135   K 4.7 5.0 5.1*   CL 93* 93* 93*   CO2 34* 35* 35*   BUN 32* 34* 34*   CREATININE 0.7 0.8 0.8   GLUCOSE 327* 296* 299*   CALCIUM 9.5 9.5 9.3         Recent Labs     01/07/23  0300 01/08/23  0240 01/09/23  0630   WBC 8.3 6.3 6.3   RBC 3.82 3.77 4.03   HGB 12.0 11.7 12.6   HCT 39.3 39.1 41.3   .9* 103.7* 102.5*   MCH 31.4 31.0 31.3   MCHC 30.5* 29.9* 30.5*   RDW 14.3 14.2 14.1    205 214   MPV 11.1 10.9 10.7         Radiology:     CTA CHEST 1/5/23  FINDINGS:   Pulmonary Arteries: Pulmonary arteries are adequately opacified for evaluation. No evidence of intraluminal filling defect to suggest pulmonary embolism. Main pulmonary artery is normal in caliber. Mediastinum: No evidence of mediastinal lymphadenopathy.   The heart and pericardium demonstrate no acute abnormality.  Moderate coronary artery calcifications are seen.  There is no acute abnormality of the thoracic aorta.     Lungs/pleura: Moderate centrilobular and paraseptal emphysema is noted. Posterior right upper lobe and bilateral lower lobe basilar peripheral patchy airspace opacities are seen with associated ground-glass density and airway wall thickening.  No pleural effusion or pneumothorax seen.       Upper Abdomen: Diffusely decreased hepatic attenuation is noted suggestive of fatty infiltration.   Soft Tissues/Bones: No acute bone or soft tissue abnormality.     NOTE: This report was transcribed using voice recognition software. Every effort was made to ensure accuracy; however, inadvertent computerized transcription errors may be present.  Electronically signed by LINWOOD Cline CNP on 1/9/2023 at 12:59 PM

## 2023-01-09 NOTE — PLAN OF CARE
Problem: Discharge Planning  Goal: Discharge to home or other facility with appropriate resources  1/8/2023 2228 by Jose Jones RN  Outcome: Progressing     Problem: Safety - Adult  Goal: Free from fall injury  1/8/2023 2228 by Jose Jones RN  Outcome: Progressing     Problem: ABCDS Injury Assessment  Goal: Absence of physical injury  1/8/2023 2228 by Jose Jones RN  Outcome: Progressing

## 2023-01-09 NOTE — CARE COORDINATION
CASE MANAGEMENT. .. Met with patient and her , Shani Quick, at the bedside. They live in a trailer with ramp to enter. Has wc, electric wc, walker, nebulizer, o2 and cpap. She is currently on 3lnc. Confirmed with Bianca Wyman from [address] that patient's home o2 order reads 2-3lnc cont and cpap at hs with 3lnc bleed in. She has no manolo/hcc history. At this time, dc plan is home. PT/OT ordered to see for dc planning. In the meantime, she is on iv solumedrol q 8hrs, iv rocephin qd, and po zithromax. PCP is Dr Samia Barrow.  Will follow for needs

## 2023-01-10 VITALS
OXYGEN SATURATION: 100 % | WEIGHT: 216.44 LBS | TEMPERATURE: 97.7 F | HEART RATE: 67 BPM | RESPIRATION RATE: 18 BRPM | SYSTOLIC BLOOD PRESSURE: 128 MMHG | BODY MASS INDEX: 36.06 KG/M2 | HEIGHT: 65 IN | DIASTOLIC BLOOD PRESSURE: 61 MMHG

## 2023-01-10 LAB
ANION GAP SERPL CALCULATED.3IONS-SCNC: 8 MMOL/L (ref 7–16)
BASOPHILS ABSOLUTE: 0 E9/L (ref 0–0.2)
BASOPHILS RELATIVE PERCENT: 0 % (ref 0–2)
BUN BLDV-MCNC: 33 MG/DL (ref 6–23)
CALCIUM SERPL-MCNC: 9.2 MG/DL (ref 8.6–10.2)
CHLORIDE BLD-SCNC: 90 MMOL/L (ref 98–107)
CO2: 36 MMOL/L (ref 22–29)
CREAT SERPL-MCNC: 0.8 MG/DL (ref 0.5–1)
EOSINOPHILS ABSOLUTE: 0 E9/L (ref 0.05–0.5)
EOSINOPHILS RELATIVE PERCENT: 0 % (ref 0–6)
GFR SERPL CREATININE-BSD FRML MDRD: >60 ML/MIN/1.73
GLUCOSE BLD-MCNC: 360 MG/DL (ref 74–99)
HCT VFR BLD CALC: 39.4 % (ref 34–48)
HEMOGLOBIN: 12.1 G/DL (ref 11.5–15.5)
IMMATURE GRANULOCYTES #: 0.11 E9/L
IMMATURE GRANULOCYTES %: 2 % (ref 0–5)
LYMPHOCYTES ABSOLUTE: 0.66 E9/L (ref 1.5–4)
LYMPHOCYTES RELATIVE PERCENT: 11.7 % (ref 20–42)
MCH RBC QN AUTO: 31 PG (ref 26–35)
MCHC RBC AUTO-ENTMCNC: 30.7 % (ref 32–34.5)
MCV RBC AUTO: 101 FL (ref 80–99.9)
METER GLUCOSE: 219 MG/DL (ref 74–99)
METER GLUCOSE: 268 MG/DL (ref 74–99)
MONOCYTES ABSOLUTE: 0.19 E9/L (ref 0.1–0.95)
MONOCYTES RELATIVE PERCENT: 3.4 % (ref 2–12)
NEUTROPHILS ABSOLUTE: 4.66 E9/L (ref 1.8–7.3)
NEUTROPHILS RELATIVE PERCENT: 82.9 % (ref 43–80)
PDW BLD-RTO: 14.3 FL (ref 11.5–15)
PLATELET # BLD: 189 E9/L (ref 130–450)
PMV BLD AUTO: 11.3 FL (ref 7–12)
POTASSIUM SERPL-SCNC: 5.1 MMOL/L (ref 3.5–5)
RBC # BLD: 3.9 E12/L (ref 3.5–5.5)
SODIUM BLD-SCNC: 134 MMOL/L (ref 132–146)
WBC # BLD: 5.6 E9/L (ref 4.5–11.5)

## 2023-01-10 PROCEDURE — 82962 GLUCOSE BLOOD TEST: CPT

## 2023-01-10 PROCEDURE — APPSS30 APP SPLIT SHARED TIME 16-30 MINUTES: Performed by: NURSE PRACTITIONER

## 2023-01-10 PROCEDURE — 36415 COLL VENOUS BLD VENIPUNCTURE: CPT

## 2023-01-10 PROCEDURE — 2580000003 HC RX 258: Performed by: NURSE PRACTITIONER

## 2023-01-10 PROCEDURE — 6370000000 HC RX 637 (ALT 250 FOR IP): Performed by: NURSE PRACTITIONER

## 2023-01-10 PROCEDURE — 99239 HOSP IP/OBS DSCHRG MGMT >30: CPT | Performed by: STUDENT IN AN ORGANIZED HEALTH CARE EDUCATION/TRAINING PROGRAM

## 2023-01-10 PROCEDURE — 6360000002 HC RX W HCPCS: Performed by: NURSE PRACTITIONER

## 2023-01-10 PROCEDURE — 94640 AIRWAY INHALATION TREATMENT: CPT

## 2023-01-10 PROCEDURE — 2700000000 HC OXYGEN THERAPY PER DAY

## 2023-01-10 PROCEDURE — 80048 BASIC METABOLIC PNL TOTAL CA: CPT

## 2023-01-10 PROCEDURE — 85025 COMPLETE CBC W/AUTO DIFF WBC: CPT

## 2023-01-10 RX ORDER — PREDNISONE 2.5 MG
2.5 TABLET ORAL DAILY
Qty: 30 TABLET | Refills: 0 | Status: SHIPPED
Start: 2023-01-10

## 2023-01-10 RX ORDER — HYDROCODONE BITARTRATE AND HOMATROPINE METHYLBROMIDE ORAL SOLUTION 5; 1.5 MG/5ML; MG/5ML
5 LIQUID ORAL EVERY 6 HOURS PRN
Qty: 60 ML | Refills: 0 | Status: SHIPPED | OUTPATIENT
Start: 2023-01-10 | End: 2023-01-10 | Stop reason: SDUPTHER

## 2023-01-10 RX ORDER — PREDNISONE 10 MG/1
TABLET ORAL
Qty: 37 TABLET | Refills: 0 | Status: SHIPPED | OUTPATIENT
Start: 2023-01-10 | End: 2023-01-10 | Stop reason: SDUPTHER

## 2023-01-10 RX ORDER — PREDNISONE 10 MG/1
TABLET ORAL
Qty: 37 TABLET | Refills: 0 | Status: SHIPPED | OUTPATIENT
Start: 2023-01-10 | End: 2023-01-21

## 2023-01-10 RX ORDER — CEFUROXIME AXETIL 500 MG/1
500 TABLET ORAL 2 TIMES DAILY
Qty: 6 TABLET | Refills: 0 | Status: SHIPPED | OUTPATIENT
Start: 2023-01-10 | End: 2023-01-13

## 2023-01-10 RX ORDER — HYDROCODONE BITARTRATE AND HOMATROPINE METHYLBROMIDE ORAL SOLUTION 5; 1.5 MG/5ML; MG/5ML
5 LIQUID ORAL EVERY 6 HOURS PRN
Qty: 60 ML | Refills: 0 | Status: SHIPPED | OUTPATIENT
Start: 2023-01-10 | End: 2023-01-13

## 2023-01-10 RX ORDER — CEFUROXIME AXETIL 500 MG/1
500 TABLET ORAL 2 TIMES DAILY
Qty: 6 TABLET | Refills: 0 | Status: SHIPPED | OUTPATIENT
Start: 2023-01-10 | End: 2023-01-10 | Stop reason: SDUPTHER

## 2023-01-10 RX ADMIN — INSULIN LISPRO 4 UNITS: 100 INJECTION, SOLUTION INTRAVENOUS; SUBCUTANEOUS at 06:10

## 2023-01-10 RX ADMIN — INSULIN LISPRO 2 UNITS: 100 INJECTION, SOLUTION INTRAVENOUS; SUBCUTANEOUS at 10:36

## 2023-01-10 RX ADMIN — METHYLPREDNISOLONE SODIUM SUCCINATE 40 MG: 40 INJECTION, POWDER, LYOPHILIZED, FOR SOLUTION INTRAMUSCULAR; INTRAVENOUS at 08:09

## 2023-01-10 RX ADMIN — ARFORMOTEROL TARTRATE 15 MCG: 15 SOLUTION RESPIRATORY (INHALATION) at 08:25

## 2023-01-10 RX ADMIN — IPRATROPIUM BROMIDE AND ALBUTEROL SULFATE 1 AMPULE: .5; 2.5 SOLUTION RESPIRATORY (INHALATION) at 08:25

## 2023-01-10 RX ADMIN — SODIUM CHLORIDE, PRESERVATIVE FREE 10 ML: 5 INJECTION INTRAVENOUS at 08:09

## 2023-01-10 RX ADMIN — ENOXAPARIN SODIUM 40 MG: 100 INJECTION SUBCUTANEOUS at 08:09

## 2023-01-10 RX ADMIN — AMLODIPINE BESYLATE 5 MG: 5 TABLET ORAL at 08:09

## 2023-01-10 RX ADMIN — LISINOPRIL 20 MG: 10 TABLET ORAL at 08:09

## 2023-01-10 RX ADMIN — BUDESONIDE 500 MCG: 0.5 SUSPENSION RESPIRATORY (INHALATION) at 08:25

## 2023-01-10 RX ADMIN — INSULIN LISPRO 5 UNITS: 100 INJECTION, SOLUTION INTRAVENOUS; SUBCUTANEOUS at 10:36

## 2023-01-10 RX ADMIN — SERTRALINE 50 MG: 50 TABLET, FILM COATED ORAL at 08:09

## 2023-01-10 RX ADMIN — PANTOPRAZOLE SODIUM 40 MG: 40 TABLET, DELAYED RELEASE ORAL at 05:50

## 2023-01-10 RX ADMIN — INSULIN LISPRO 5 UNITS: 100 INJECTION, SOLUTION INTRAVENOUS; SUBCUTANEOUS at 06:10

## 2023-01-10 ASSESSMENT — PAIN SCALES - GENERAL: PAINLEVEL_OUTOF10: 0

## 2023-01-10 NOTE — CARE COORDINATION
CASE MANAGEMENT. ... Chart reviewed. Per notes from yesterday, PT/OT were thought to be ordered for dc planning, but were not. Per nursing patient is self sufficient and has dme at home. No need for therapy to see. Continues on po zithromax and iv rocephin. Iv solumedrol weaned to q12hrs. Anticipate dc today. Plan is home. Await pcp input. Will follow.

## 2023-01-10 NOTE — DISCHARGE SUMMARY
[facility] Physician Discharge Summary       No follow-up provider specified. Activity level: As tolerated     Dispo: Home      Condition on discharge: Stable     Patient ID:  Nicholas Cruz  [id number]  57 y.o.  1955    Admit date: 1/5/2023    Discharge date and time:  1/10/2023  11:23 AM    Admission Diagnoses: Principal Problem:    Multifocal pneumonia  Active Problems:    Acute respiratory failure with hypoxia ([address]    Essential hypertension    COPD exacerbation ([address]  Resolved Problems:    * No resolved hospital problems. *      Discharge Diagnoses: Principal Problem:    Multifocal pneumonia  Active Problems:    Acute respiratory failure with hypoxia ([address]    Essential hypertension    COPD exacerbation (HCC)  Resolved Problems:    * No resolved hospital problems. *      Consults:  IP CONSULT TO RESPIRATORY CARE    Hospital Course:   Patient Nicholas Cruz is a 69 y.o. presented with COPD exacerbation ([address] [J44.1]  Acute respiratory failure with hypoxia ([address] [J96.01]  Pneumonia of right lower lobe due to infectious organism [J18.9]    Levi  is a 69year old woman with hx of COPD and chronic respiratory failure - usual is 2-3 LPM NC. She has been ill off and on since mid November. Treated outpatient with Abx, steroids. Gets better when on antibiotics, then seems to feel worse as soon as finished. This episode started 6 days prior to admission - increased coughing, congestion, sputum, chest tightness and increased use of home nebs. Home PO2 in 70% range despite increased O2 flow rate up to 5 LPM.    Came to ED, she was hypoxic and acutely bronchospastic. CTA was negative for PE but did show pulmonary infiltrates in BLLs c/w pneumonia. Her respiratory viral panel was negative. She did have left shift on differential 92% neutrophils, this treated for secondary bacterial pneumonia and AECOPD. She received 3 doses IV Azithromycin and daily ceftriaxone.   She had increased wheezing and O2 requirements, solu-medrol given TID for 2 days then tapered to BID. She was able to wean back to her usual 3 LPM NC and ambulated without desaturation. States she felt back to baseline. She will be discharged with 3 more days oral cefuroxime and prednisone tapering dose over next 11 days. Then resume usual 2,5 mg daily dose. Her glucose was elevated in setting of steroids, increased Metformin to 1000 mg BID upon discharge. She was offered pulmonology consult while inpatient but declined, stating she will continue follow with PCP re: her lung disease. Other medications remain unchanged, discharged home in stable condition. Declined need for [place] [id number]     Discharge Exam:    General Appearance: alert and oriented to person, place and time and in no acute distress  Skin: warm and dry  Head: normocephalic and atraumatic  Eyes: pupils equal, round, and reactive to light, extraocular eye movements intact, conjunctivae normal  Neck: neck supple and non tender without mass   Pulmonary/Chest: much improved air entry and minimal wheezing   Cardiovascular: normal rate, normal S1 and S2 and no carotid bruits  Abdomen: soft, non-tender, non-distended, normal bowel sounds, no masses or organomegaly  Extremities: no cyanosis, no clubbing and no edema  Neurologic: no cranial nerve deficit and speech normal    I/O last 3 completed shifts: In: 10 [I.V.:10]  Out: -   No intake/output data recorded.       LABS:  Recent Labs     01/08/23 0240 01/09/23 0630 01/10/23  0330    135 134   K 5.0 5.1* 5.1*   CL 93* 93* 90*   CO2 35* 35* 36*   BUN 34* 34* 33*   CREATININE 0.8 0.8 0.8   GLUCOSE 296* 299* 360*   CALCIUM 9.5 9.3 9.2       Recent Labs     01/08/23 0240 01/09/23  0630 01/10/23  0330   WBC 6.3 6.3 5.6   RBC 3.77 4.03 3.90   HGB 11.7 12.6 12.1   HCT 39.1 41.3 39.4   .7* 102.5* 101.0*   MCH 31.0 31.3 31.0   MCHC 29.9* 30.5* 30.7*   RDW 14.2 14.1 14.3    214 189   MPV 10.9 10.7 11.3       No results for input(s): POCGLU in the last 72 hours. Imaging:  XR CHEST (2 VW)    Result Date: 1/5/2023  EXAMINATION: TWO XRAY VIEWS OF THE CHEST 1/5/2023 12:26 pm COMPARISON: 21 April 2022 HISTORY: ORDERING SYSTEM PROVIDED HISTORY: cough TECHNOLOGIST PROVIDED HISTORY: Reason for exam:->cough FINDINGS: There is obstructive airways disease. There is subtle pneumonic infiltrate at the right upper lobe. Normal heart and pulmonary vascularity. Neither costophrenic angle is blunted. Subtle pneumonia at the right upper lobe. Obstructive airways disease. CTA PULMONARY W CONTRAST    Result Date: 1/5/2023  EXAMINATION: CTA OF THE CHEST 1/5/2023 4:05 pm TECHNIQUE: CTA of the chest was performed after the administration of intravenous contrast.  Multiplanar reformatted images are provided for review. MIP images are provided for review. Automated exposure control, iterative reconstruction, and/or weight based adjustment of the mA/kV was utilized to reduce the radiation dose to as low as reasonably achievable. COMPARISON: 01/27/2022 HISTORY: ORDERING SYSTEM PROVIDED HISTORY: r/o PE vs pneumonia TECHNOLOGIST PROVIDED HISTORY: Reason for exam:->r/o PE vs pneumonia Decision Support Exception - unselect if not a suspected or confirmed emergency medical condition->Emergency Medical Condition (MA) FINDINGS: Pulmonary Arteries: Pulmonary arteries are adequately opacified for evaluation. No evidence of intraluminal filling defect to suggest pulmonary embolism. Main pulmonary artery is normal in caliber. Mediastinum: No evidence of mediastinal lymphadenopathy. The heart and pericardium demonstrate no acute abnormality. Moderate coronary artery calcifications are seen. There is no acute abnormality of the thoracic aorta. Lungs/pleura: Moderate centrilobular and paraseptal emphysema is noted.  Posterior right upper lobe and bilateral lower lobe basilar peripheral patchy airspace opacities are seen with associated ground-glass density and airway wall thickening. No pleural effusion or pneumothorax seen. Upper Abdomen: Diffusely decreased hepatic attenuation is noted suggestive of fatty infiltration. Soft Tissues/Bones: No acute bone or soft tissue abnormality. No evidence of pulmonary embolism. Right upper lobe and bilateral lower lobe peripheral patchy airspace opacities with ground-glass density and airway wall thickening consistent with multifocal pneumonia. Moderate centrilobular and paraseptal emphysema. Fatty liver. Patient Instructions:      Medication List        START taking these medications      cefUROXime 500 MG tablet  Commonly known as: CEFTIN  Take 1 tablet by mouth 2 times daily for 3 days     HYDROcodone homatropine 5-1.5 MG/5ML solution  Commonly known as: HYCODAN  Take 5 mLs by mouth every 6 hours as needed (cough) for up to 3 days. Max Daily Amount: 20 mLs            CHANGE how you take these medications      lisinopril 20 MG tablet  Commonly known as: PRINIVIL;ZESTRIL  TAKE ONE TABLET BY MOUTH EVERY DAY  What changed:   how much to take  when to take this     metFORMIN 1000 MG tablet  Commonly known as: GLUCOPHAGE  Take 1 tablet by mouth 2 times daily (with meals)  What changed:   medication strength  how much to take     * predniSONE 10 MG tablet  Commonly known as: DELTASONE  Take 4 tablets by mouth 2 times daily for 2 days, THEN 4 tablets daily for 3 days, THEN 2 tablets daily for 3 days, THEN 1 tablet daily for 3 days. RESUME USUAL HOME DOSE OF 2.5 MG DAILY AT COMPLETION OF TAPER. Start taking on: January 10, 2023  What changed: You were already taking a medication with the same name, and this prescription was added. Make sure you understand how and when to take each.      * predniSONE 2.5 MG tablet  Commonly known as: DELTASONE  Take 1 tablet by mouth daily RESUME 2.5 MG DAILY DOSE AFTER COMPLETION OF TAPER  What changed: additional instructions           * This list has 2 medication(s) that are the same as other medications prescribed for you. Read the directions carefully, and ask your doctor or other care provider to review them with you.                 CONTINUE taking these medications      amLODIPine 10 MG tablet  Commonly known as: NORVASC  TAKE ONE TABLET BY MOUTH EVERY DAY     benzonatate 100 MG capsule  Commonly known as: TESSALON     ipratropium-albuterol 0.5-2.5 (3) MG/3ML Soln nebulizer solution  Commonly known as: DUONEB  Inhale 3 mLs into the lungs every 4 hours as needed for Shortness of Breath     loratadine 10 MG capsule  Commonly known as: CLARITIN     omeprazole 20 MG delayed release capsule  Commonly known as: PRILOSEC     sertraline 50 MG tablet  Commonly known as: ZOLOFT     simvastatin 20 MG tablet  Commonly known as: ZOCOR     Trelegy Ellipta 200-62.5-25 MCG/ACT Aepb inhaler  Generic drug: fluticasone-umeclidin-vilant     Ventolin  (90 Base) MCG/ACT inhaler  Generic drug: albuterol sulfate HFA  INHALE TWO PUFFS BY MOUTH FOUR TIMES A DAY               Where to Get Your Medications        These medications were sent to [address] [place] [address] [phone]  [facility] [place] [address]      Phone: [phone]   cefUROXime 500 MG tablet  HYDROcodone homatropine 5-1.5 MG/5ML solution  metFORMIN 1000 MG tablet  predniSONE 10 MG tablet       Information about where to get these medications is not yet available    Ask your nurse or doctor about these medications  predniSONE 2.5 MG tablet           Note that more than 30 minutes was spent in preparing discharge papers, discussing discharge with patient, medication review, etc.    Signed:  Electronically signed by LINWOOD Contreras CNP on 1/10/2023 at 11:23 AM

## 2023-05-12 ENCOUNTER — APPOINTMENT (OUTPATIENT)
Dept: CT IMAGING | Age: 68
DRG: 871 | End: 2023-05-12
Payer: MEDICARE

## 2023-05-12 ENCOUNTER — APPOINTMENT (OUTPATIENT)
Dept: ULTRASOUND IMAGING | Age: 68
DRG: 871 | End: 2023-05-12
Payer: MEDICARE

## 2023-05-12 ENCOUNTER — APPOINTMENT (OUTPATIENT)
Dept: GENERAL RADIOLOGY | Age: 68
DRG: 871 | End: 2023-05-12
Payer: MEDICARE

## 2023-05-12 ENCOUNTER — HOSPITAL ENCOUNTER (INPATIENT)
Age: 68
LOS: 3 days | Discharge: HOME | DRG: 871 | End: 2023-05-15
Attending: EMERGENCY MEDICINE | Admitting: INTERNAL MEDICINE
Payer: MEDICARE

## 2023-05-12 DIAGNOSIS — J18.9 PNEUMONIA OF BOTH LUNGS DUE TO INFECTIOUS ORGANISM, UNSPECIFIED PART OF LUNG: Primary | ICD-10-CM

## 2023-05-12 LAB
ALBUMIN SERPL-MCNC: 3.5 G/DL (ref 3.5–5.2)
ALP SERPL-CCNC: 98 U/L (ref 35–104)
ALT SERPL-CCNC: 28 U/L (ref 0–32)
ANION GAP SERPL CALCULATED.3IONS-SCNC: 9 MMOL/L (ref 7–16)
AST SERPL-CCNC: 17 U/L (ref 0–31)
B PARAP IS1001 DNA NPH QL NAA+NON-PROBE: NOT DETECTED
B PERT.PT PRMT NPH QL NAA+NON-PROBE: NOT DETECTED
BASOPHILS # BLD: 0.01 E9/L (ref 0–0.2)
BASOPHILS NFR BLD: 0.1 % (ref 0–2)
BILIRUB SERPL-MCNC: 0.5 MG/DL (ref 0–1.2)
BNP BLD-MCNC: 111 PG/ML (ref 0–125)
BUN SERPL-MCNC: 18 MG/DL (ref 6–23)
C PNEUM DNA NPH QL NAA+NON-PROBE: NOT DETECTED
CALCIUM SERPL-MCNC: 9.1 MG/DL (ref 8.6–10.2)
CHLORIDE SERPL-SCNC: 98 MMOL/L (ref 98–107)
CO2 SERPL-SCNC: 32 MMOL/L (ref 22–29)
CREAT SERPL-MCNC: 0.7 MG/DL (ref 0.5–1)
CRP SERPL HS-MCNC: 10.1 MG/DL (ref 0–0.4)
EKG ATRIAL RATE: 111 BPM
EKG P AXIS: 61 DEGREES
EKG P-R INTERVAL: 126 MS
EKG Q-T INTERVAL: 334 MS
EKG QRS DURATION: 88 MS
EKG QTC CALCULATION (BAZETT): 454 MS
EKG R AXIS: 51 DEGREES
EKG T AXIS: 44 DEGREES
EKG VENTRICULAR RATE: 111 BPM
EOSINOPHIL # BLD: 0.09 E9/L (ref 0.05–0.5)
EOSINOPHIL NFR BLD: 1.2 % (ref 0–6)
ERYTHROCYTE [DISTWIDTH] IN BLOOD BY AUTOMATED COUNT: 13.5 FL (ref 11.5–15)
ERYTHROCYTE [SEDIMENTATION RATE] IN BLOOD BY WESTERGREN METHOD: 64 MM/HR (ref 0–20)
FLUAV RNA NPH QL NAA+NON-PROBE: NOT DETECTED
FLUBV RNA NPH QL NAA+NON-PROBE: NOT DETECTED
GLUCOSE SERPL-MCNC: 289 MG/DL (ref 74–99)
HADV DNA NPH QL NAA+NON-PROBE: NOT DETECTED
HBA1C MFR BLD: 9.2 % (ref 4–5.6)
HCOV 229E RNA NPH QL NAA+NON-PROBE: NOT DETECTED
HCOV HKU1 RNA NPH QL NAA+NON-PROBE: NOT DETECTED
HCOV NL63 RNA NPH QL NAA+NON-PROBE: NOT DETECTED
HCOV OC43 RNA NPH QL NAA+NON-PROBE: NOT DETECTED
HCT VFR BLD AUTO: 38.5 % (ref 34–48)
HGB BLD-MCNC: 12.1 G/DL (ref 11.5–15.5)
HMPV RNA NPH QL NAA+NON-PROBE: NOT DETECTED
HPIV1 RNA NPH QL NAA+NON-PROBE: NOT DETECTED
HPIV2 RNA NPH QL NAA+NON-PROBE: NOT DETECTED
HPIV3 RNA NPH QL NAA+NON-PROBE: NOT DETECTED
HPIV4 RNA NPH QL NAA+NON-PROBE: NOT DETECTED
IMM GRANULOCYTES # BLD: 0.03 E9/L
IMM GRANULOCYTES NFR BLD: 0.4 % (ref 0–5)
LACTATE BLDV-SCNC: 1.6 MMOL/L (ref 0.5–2.2)
LIPASE: 35 U/L (ref 13–60)
LYMPHOCYTES # BLD: 1.41 E9/L (ref 1.5–4)
LYMPHOCYTES NFR BLD: 18 % (ref 20–42)
M PNEUMO DNA NPH QL NAA+NON-PROBE: NOT DETECTED
MAGNESIUM SERPL-MCNC: 1.8 MG/DL (ref 1.6–2.6)
MCH RBC QN AUTO: 31 PG (ref 26–35)
MCHC RBC AUTO-ENTMCNC: 31.4 % (ref 32–34.5)
MCV RBC AUTO: 98.7 FL (ref 80–99.9)
METER GLUCOSE: 384 MG/DL (ref 74–99)
METER GLUCOSE: 410 MG/DL (ref 74–99)
METER GLUCOSE: 426 MG/DL (ref 74–99)
MONOCYTES # BLD: 0.5 E9/L (ref 0.1–0.95)
MONOCYTES NFR BLD: 6.4 % (ref 2–12)
NEUTROPHILS # BLD: 5.78 E9/L (ref 1.8–7.3)
NEUTS SEG NFR BLD: 73.9 % (ref 43–80)
PLATELET # BLD AUTO: 145 E9/L (ref 130–450)
PMV BLD AUTO: 11.6 FL (ref 7–12)
POTASSIUM SERPL-SCNC: 4.5 MMOL/L (ref 3.5–5)
PROCALCITONIN: 0.12 NG/ML (ref 0–0.08)
PROT SERPL-MCNC: 6.6 G/DL (ref 6.4–8.3)
RBC # BLD AUTO: 3.9 E12/L (ref 3.5–5.5)
RSV RNA NPH QL NAA+NON-PROBE: NOT DETECTED
RV+EV RNA NPH QL NAA+NON-PROBE: NOT DETECTED
SARS-COV-2 RDRP RESP QL NAA+PROBE: NOT DETECTED
SARS-COV-2 RNA NPH QL NAA+NON-PROBE: NOT DETECTED
SODIUM SERPL-SCNC: 139 MMOL/L (ref 132–146)
TROPONIN, HIGH SENSITIVITY: 12 NG/L (ref 0–9)
TSH SERPL-MCNC: 2.72 UIU/ML (ref 0.27–4.2)
WBC # BLD: 7.8 E9/L (ref 4.5–11.5)

## 2023-05-12 PROCEDURE — 6360000004 HC RX CONTRAST MEDICATION: Performed by: RADIOLOGY

## 2023-05-12 PROCEDURE — 83735 ASSAY OF MAGNESIUM: CPT

## 2023-05-12 PROCEDURE — 94640 AIRWAY INHALATION TREATMENT: CPT

## 2023-05-12 PROCEDURE — 80053 COMPREHEN METABOLIC PANEL: CPT

## 2023-05-12 PROCEDURE — 87635 SARS-COV-2 COVID-19 AMP PRB: CPT

## 2023-05-12 PROCEDURE — 83036 HEMOGLOBIN GLYCOSYLATED A1C: CPT

## 2023-05-12 PROCEDURE — 6360000002 HC RX W HCPCS: Performed by: INTERNAL MEDICINE

## 2023-05-12 PROCEDURE — 82962 GLUCOSE BLOOD TEST: CPT

## 2023-05-12 PROCEDURE — 76705 ECHO EXAM OF ABDOMEN: CPT

## 2023-05-12 PROCEDURE — 84145 PROCALCITONIN (PCT): CPT

## 2023-05-12 PROCEDURE — 71045 X-RAY EXAM CHEST 1 VIEW: CPT

## 2023-05-12 PROCEDURE — 85025 COMPLETE CBC W/AUTO DIFF WBC: CPT

## 2023-05-12 PROCEDURE — 0202U NFCT DS 22 TRGT SARS-COV-2: CPT

## 2023-05-12 PROCEDURE — 2580000003 HC RX 258: Performed by: INTERNAL MEDICINE

## 2023-05-12 PROCEDURE — 6370000000 HC RX 637 (ALT 250 FOR IP): Performed by: NURSE PRACTITIONER

## 2023-05-12 PROCEDURE — 36415 COLL VENOUS BLD VENIPUNCTURE: CPT

## 2023-05-12 PROCEDURE — 93005 ELECTROCARDIOGRAM TRACING: CPT | Performed by: EMERGENCY MEDICINE

## 2023-05-12 PROCEDURE — 99285 EMERGENCY DEPT VISIT HI MDM: CPT

## 2023-05-12 PROCEDURE — 94660 CPAP INITIATION&MGMT: CPT

## 2023-05-12 PROCEDURE — 83880 ASSAY OF NATRIURETIC PEPTIDE: CPT

## 2023-05-12 PROCEDURE — 6370000000 HC RX 637 (ALT 250 FOR IP): Performed by: INTERNAL MEDICINE

## 2023-05-12 PROCEDURE — 84484 ASSAY OF TROPONIN QUANT: CPT

## 2023-05-12 PROCEDURE — 96375 TX/PRO/DX INJ NEW DRUG ADDON: CPT

## 2023-05-12 PROCEDURE — 83690 ASSAY OF LIPASE: CPT

## 2023-05-12 PROCEDURE — 6370000000 HC RX 637 (ALT 250 FOR IP): Performed by: EMERGENCY MEDICINE

## 2023-05-12 PROCEDURE — 85651 RBC SED RATE NONAUTOMATED: CPT

## 2023-05-12 PROCEDURE — 6360000002 HC RX W HCPCS: Performed by: EMERGENCY MEDICINE

## 2023-05-12 PROCEDURE — 86140 C-REACTIVE PROTEIN: CPT

## 2023-05-12 PROCEDURE — 71275 CT ANGIOGRAPHY CHEST: CPT

## 2023-05-12 PROCEDURE — 2580000003 HC RX 258: Performed by: EMERGENCY MEDICINE

## 2023-05-12 PROCEDURE — 93010 ELECTROCARDIOGRAM REPORT: CPT | Performed by: INTERNAL MEDICINE

## 2023-05-12 PROCEDURE — 96365 THER/PROPH/DIAG IV INF INIT: CPT

## 2023-05-12 PROCEDURE — 2060000000 HC ICU INTERMEDIATE R&B

## 2023-05-12 PROCEDURE — 83605 ASSAY OF LACTIC ACID: CPT

## 2023-05-12 PROCEDURE — 84443 ASSAY THYROID STIM HORMONE: CPT

## 2023-05-12 PROCEDURE — 94664 DEMO&/EVAL PT USE INHALER: CPT

## 2023-05-12 PROCEDURE — 2500000003 HC RX 250 WO HCPCS: Performed by: EMERGENCY MEDICINE

## 2023-05-12 PROCEDURE — 87040 BLOOD CULTURE FOR BACTERIA: CPT

## 2023-05-12 RX ORDER — INSULIN LISPRO 100 [IU]/ML
0-4 INJECTION, SOLUTION INTRAVENOUS; SUBCUTANEOUS NIGHTLY
Status: DISCONTINUED | OUTPATIENT
Start: 2023-05-12 | End: 2023-05-15 | Stop reason: HOSPADM

## 2023-05-12 RX ORDER — ENOXAPARIN SODIUM 100 MG/ML
40 INJECTION SUBCUTANEOUS DAILY
Status: DISCONTINUED | OUTPATIENT
Start: 2023-05-12 | End: 2023-05-15 | Stop reason: HOSPADM

## 2023-05-12 RX ORDER — BENZONATATE 100 MG/1
100 CAPSULE ORAL 3 TIMES DAILY PRN
Status: DISCONTINUED | OUTPATIENT
Start: 2023-05-12 | End: 2023-05-15 | Stop reason: HOSPADM

## 2023-05-12 RX ORDER — INSULIN LISPRO 100 [IU]/ML
6 INJECTION, SOLUTION INTRAVENOUS; SUBCUTANEOUS ONCE
Status: COMPLETED | OUTPATIENT
Start: 2023-05-12 | End: 2023-05-12

## 2023-05-12 RX ORDER — ARFORMOTEROL TARTRATE 15 UG/2ML
15 SOLUTION RESPIRATORY (INHALATION) 2 TIMES DAILY
Status: DISCONTINUED | OUTPATIENT
Start: 2023-05-12 | End: 2023-05-15 | Stop reason: HOSPADM

## 2023-05-12 RX ORDER — INSULIN LISPRO 100 [IU]/ML
8 INJECTION, SOLUTION INTRAVENOUS; SUBCUTANEOUS ONCE
Status: DISCONTINUED | OUTPATIENT
Start: 2023-05-12 | End: 2023-05-15 | Stop reason: HOSPADM

## 2023-05-12 RX ORDER — ATORVASTATIN CALCIUM 20 MG/1
20 TABLET, FILM COATED ORAL DAILY
Status: DISCONTINUED | OUTPATIENT
Start: 2023-05-12 | End: 2023-05-15 | Stop reason: HOSPADM

## 2023-05-12 RX ORDER — SODIUM CHLORIDE 0.9 % (FLUSH) 0.9 %
10 SYRINGE (ML) INJECTION PRN
Status: DISCONTINUED | OUTPATIENT
Start: 2023-05-12 | End: 2023-05-15 | Stop reason: HOSPADM

## 2023-05-12 RX ORDER — INSULIN LISPRO 100 [IU]/ML
0-8 INJECTION, SOLUTION INTRAVENOUS; SUBCUTANEOUS
Status: DISCONTINUED | OUTPATIENT
Start: 2023-05-12 | End: 2023-05-12

## 2023-05-12 RX ORDER — INSULIN LISPRO 100 [IU]/ML
0-16 INJECTION, SOLUTION INTRAVENOUS; SUBCUTANEOUS
Status: DISCONTINUED | OUTPATIENT
Start: 2023-05-12 | End: 2023-05-15 | Stop reason: HOSPADM

## 2023-05-12 RX ORDER — ONDANSETRON 2 MG/ML
4 INJECTION INTRAMUSCULAR; INTRAVENOUS EVERY 6 HOURS PRN
Status: DISCONTINUED | OUTPATIENT
Start: 2023-05-12 | End: 2023-05-15 | Stop reason: HOSPADM

## 2023-05-12 RX ORDER — ONDANSETRON 4 MG/1
4 TABLET, ORALLY DISINTEGRATING ORAL EVERY 8 HOURS PRN
Status: DISCONTINUED | OUTPATIENT
Start: 2023-05-12 | End: 2023-05-15 | Stop reason: HOSPADM

## 2023-05-12 RX ORDER — LISINOPRIL 20 MG/1
20 TABLET ORAL EVERY MORNING
Status: DISCONTINUED | OUTPATIENT
Start: 2023-05-13 | End: 2023-05-15 | Stop reason: HOSPADM

## 2023-05-12 RX ORDER — PREDNISONE 2.5 MG
2.5 TABLET ORAL EVERY MORNING
Status: ON HOLD | COMMUNITY
End: 2023-05-15 | Stop reason: HOSPADM

## 2023-05-12 RX ORDER — PREDNISONE 20 MG/1
40 TABLET ORAL DAILY
Status: DISCONTINUED | OUTPATIENT
Start: 2023-05-15 | End: 2023-05-15 | Stop reason: HOSPADM

## 2023-05-12 RX ORDER — FLUTICASONE FUROATE, UMECLIDINIUM BROMIDE AND VILANTEROL TRIFENATATE 200; 62.5; 25 UG/1; UG/1; UG/1
1 POWDER RESPIRATORY (INHALATION) EVERY MORNING
COMMUNITY

## 2023-05-12 RX ORDER — IPRATROPIUM BROMIDE AND ALBUTEROL SULFATE 2.5; .5 MG/3ML; MG/3ML
1 SOLUTION RESPIRATORY (INHALATION)
Status: COMPLETED | OUTPATIENT
Start: 2023-05-12 | End: 2023-05-12

## 2023-05-12 RX ORDER — AMLODIPINE BESYLATE 5 MG/1
5 TABLET ORAL EVERY MORNING
Status: DISCONTINUED | OUTPATIENT
Start: 2023-05-13 | End: 2023-05-15 | Stop reason: HOSPADM

## 2023-05-12 RX ORDER — POLYETHYLENE GLYCOL 3350 17 G/17G
17 POWDER, FOR SOLUTION ORAL DAILY PRN
Status: DISCONTINUED | OUTPATIENT
Start: 2023-05-12 | End: 2023-05-15 | Stop reason: HOSPADM

## 2023-05-12 RX ORDER — ACETAMINOPHEN 650 MG/1
650 SUPPOSITORY RECTAL EVERY 6 HOURS PRN
Status: DISCONTINUED | OUTPATIENT
Start: 2023-05-12 | End: 2023-05-15 | Stop reason: HOSPADM

## 2023-05-12 RX ORDER — PANTOPRAZOLE SODIUM 40 MG/1
40 TABLET, DELAYED RELEASE ORAL
Status: DISCONTINUED | OUTPATIENT
Start: 2023-05-13 | End: 2023-05-15 | Stop reason: HOSPADM

## 2023-05-12 RX ORDER — DEXTROSE MONOHYDRATE 100 MG/ML
INJECTION, SOLUTION INTRAVENOUS CONTINUOUS PRN
Status: DISCONTINUED | OUTPATIENT
Start: 2023-05-12 | End: 2023-05-15 | Stop reason: HOSPADM

## 2023-05-12 RX ORDER — ALBUTEROL SULFATE 90 UG/1
2 AEROSOL, METERED RESPIRATORY (INHALATION) EVERY 4 HOURS PRN
COMMUNITY

## 2023-05-12 RX ORDER — SODIUM CHLORIDE 0.9 % (FLUSH) 0.9 %
5-40 SYRINGE (ML) INJECTION PRN
Status: DISCONTINUED | OUTPATIENT
Start: 2023-05-12 | End: 2023-05-15 | Stop reason: HOSPADM

## 2023-05-12 RX ORDER — LEVALBUTEROL 1.25 MG/.5ML
1.25 SOLUTION, CONCENTRATE RESPIRATORY (INHALATION) EVERY 8 HOURS PRN
Status: DISCONTINUED | OUTPATIENT
Start: 2023-05-12 | End: 2023-05-15 | Stop reason: HOSPADM

## 2023-05-12 RX ORDER — METHYLPREDNISOLONE SODIUM SUCCINATE 40 MG/ML
40 INJECTION, POWDER, LYOPHILIZED, FOR SOLUTION INTRAMUSCULAR; INTRAVENOUS EVERY 6 HOURS
Status: COMPLETED | OUTPATIENT
Start: 2023-05-12 | End: 2023-05-14

## 2023-05-12 RX ORDER — BUDESONIDE 0.5 MG/2ML
0.5 INHALANT ORAL 2 TIMES DAILY
Status: DISCONTINUED | OUTPATIENT
Start: 2023-05-12 | End: 2023-05-15 | Stop reason: HOSPADM

## 2023-05-12 RX ORDER — CETIRIZINE HYDROCHLORIDE 10 MG/1
10 TABLET ORAL DAILY
Status: DISCONTINUED | OUTPATIENT
Start: 2023-05-12 | End: 2023-05-15 | Stop reason: HOSPADM

## 2023-05-12 RX ORDER — AMLODIPINE BESYLATE 5 MG/1
5 TABLET ORAL EVERY MORNING
COMMUNITY

## 2023-05-12 RX ORDER — SODIUM CHLORIDE 9 MG/ML
INJECTION, SOLUTION INTRAVENOUS PRN
Status: DISCONTINUED | OUTPATIENT
Start: 2023-05-12 | End: 2023-05-15 | Stop reason: HOSPADM

## 2023-05-12 RX ORDER — GUAIFENESIN 400 MG/1
400 TABLET ORAL 4 TIMES DAILY
Status: DISCONTINUED | OUTPATIENT
Start: 2023-05-12 | End: 2023-05-15 | Stop reason: HOSPADM

## 2023-05-12 RX ORDER — METHYLPREDNISOLONE SODIUM SUCCINATE 125 MG/2ML
125 INJECTION, POWDER, LYOPHILIZED, FOR SOLUTION INTRAMUSCULAR; INTRAVENOUS ONCE
Status: COMPLETED | OUTPATIENT
Start: 2023-05-12 | End: 2023-05-12

## 2023-05-12 RX ORDER — INSULIN LISPRO 100 [IU]/ML
0-4 INJECTION, SOLUTION INTRAVENOUS; SUBCUTANEOUS NIGHTLY
Status: DISCONTINUED | OUTPATIENT
Start: 2023-05-12 | End: 2023-05-12

## 2023-05-12 RX ORDER — DOXYCYCLINE HYCLATE 100 MG
100 TABLET ORAL 2 TIMES DAILY
Status: ON HOLD | COMMUNITY
Start: 2023-05-03 | End: 2023-05-15 | Stop reason: HOSPADM

## 2023-05-12 RX ORDER — SODIUM CHLORIDE 0.9 % (FLUSH) 0.9 %
5-40 SYRINGE (ML) INJECTION EVERY 12 HOURS SCHEDULED
Status: DISCONTINUED | OUTPATIENT
Start: 2023-05-12 | End: 2023-05-15 | Stop reason: HOSPADM

## 2023-05-12 RX ORDER — LISINOPRIL 20 MG/1
20 TABLET ORAL EVERY MORNING
COMMUNITY

## 2023-05-12 RX ORDER — ACETAMINOPHEN 325 MG/1
650 TABLET ORAL EVERY 6 HOURS PRN
Status: DISCONTINUED | OUTPATIENT
Start: 2023-05-12 | End: 2023-05-15 | Stop reason: HOSPADM

## 2023-05-12 RX ADMIN — METHYLPREDNISOLONE SODIUM SUCCINATE 125 MG: 125 INJECTION, POWDER, FOR SOLUTION INTRAMUSCULAR; INTRAVENOUS at 08:22

## 2023-05-12 RX ADMIN — DOXYCYCLINE 100 MG: 100 INJECTION, POWDER, LYOPHILIZED, FOR SOLUTION INTRAVENOUS at 14:14

## 2023-05-12 RX ADMIN — IPRATROPIUM BROMIDE AND ALBUTEROL SULFATE 1 AMPULE: 2.5; .5 SOLUTION RESPIRATORY (INHALATION) at 08:27

## 2023-05-12 RX ADMIN — IOPAMIDOL 75 ML: 755 INJECTION, SOLUTION INTRAVENOUS at 10:11

## 2023-05-12 RX ADMIN — CEFEPIME 2000 MG: 2 INJECTION, POWDER, FOR SOLUTION INTRAVENOUS at 13:00

## 2023-05-12 RX ADMIN — INSULIN LISPRO 4 UNITS: 100 INJECTION, SOLUTION INTRAVENOUS; SUBCUTANEOUS at 21:53

## 2023-05-12 RX ADMIN — IPRATROPIUM BROMIDE AND ALBUTEROL SULFATE 1 AMPULE: 2.5; .5 SOLUTION RESPIRATORY (INHALATION) at 08:35

## 2023-05-12 RX ADMIN — INSULIN LISPRO 16 UNITS: 100 INJECTION, SOLUTION INTRAVENOUS; SUBCUTANEOUS at 16:41

## 2023-05-12 RX ADMIN — IPRATROPIUM BROMIDE AND ALBUTEROL SULFATE 1 AMPULE: 2.5; .5 SOLUTION RESPIRATORY (INHALATION) at 08:20

## 2023-05-12 RX ADMIN — GUAIFENESIN 400 MG: 400 TABLET ORAL at 21:38

## 2023-05-12 RX ADMIN — GUAIFENESIN 400 MG: 400 TABLET ORAL at 16:27

## 2023-05-12 RX ADMIN — METHYLPREDNISOLONE SODIUM SUCCINATE 40 MG: 40 INJECTION, POWDER, LYOPHILIZED, FOR SOLUTION INTRAMUSCULAR; INTRAVENOUS at 16:27

## 2023-05-12 RX ADMIN — METHYLPREDNISOLONE SODIUM SUCCINATE 40 MG: 40 INJECTION, POWDER, LYOPHILIZED, FOR SOLUTION INTRAMUSCULAR; INTRAVENOUS at 21:41

## 2023-05-12 RX ADMIN — INSULIN LISPRO 6 UNITS: 100 INJECTION, SOLUTION INTRAVENOUS; SUBCUTANEOUS at 21:54

## 2023-05-12 RX ADMIN — LEVALBUTEROL 1.25 MG: 1.25 SOLUTION, CONCENTRATE RESPIRATORY (INHALATION) at 19:56

## 2023-05-12 RX ADMIN — Medication 10 ML: at 22:03

## 2023-05-12 RX ADMIN — ENOXAPARIN SODIUM 40 MG: 100 INJECTION SUBCUTANEOUS at 16:27

## 2023-05-12 ASSESSMENT — ENCOUNTER SYMPTOMS
ABDOMINAL PAIN: 0
RHINORRHEA: 0
COUGH: 0
PHOTOPHOBIA: 0
BACK PAIN: 1
SINUS PAIN: 0
SHORTNESS OF BREATH: 0

## 2023-05-12 ASSESSMENT — PAIN DESCRIPTION - ORIENTATION: ORIENTATION: RIGHT

## 2023-05-12 ASSESSMENT — PAIN - FUNCTIONAL ASSESSMENT
PAIN_FUNCTIONAL_ASSESSMENT: 0-10
PAIN_FUNCTIONAL_ASSESSMENT: 0-10

## 2023-05-12 ASSESSMENT — PAIN DESCRIPTION - PAIN TYPE: TYPE: ACUTE PAIN

## 2023-05-12 ASSESSMENT — PAIN DESCRIPTION - LOCATION: LOCATION: RIB CAGE

## 2023-05-12 ASSESSMENT — LIFESTYLE VARIABLES
HOW MANY STANDARD DRINKS CONTAINING ALCOHOL DO YOU HAVE ON A TYPICAL DAY: PATIENT DOES NOT DRINK
HOW OFTEN DO YOU HAVE A DRINK CONTAINING ALCOHOL: NEVER

## 2023-05-12 ASSESSMENT — PAIN SCALES - GENERAL
PAINLEVEL_OUTOF10: 1
PAINLEVEL_OUTOF10: 0
PAINLEVEL_OUTOF10: 10

## 2023-05-12 ASSESSMENT — PAIN DESCRIPTION - FREQUENCY: FREQUENCY: INTERMITTENT

## 2023-05-13 ENCOUNTER — APPOINTMENT (OUTPATIENT)
Dept: CT IMAGING | Age: 68
DRG: 871 | End: 2023-05-13
Payer: MEDICARE

## 2023-05-13 LAB
ANION GAP SERPL CALCULATED.3IONS-SCNC: 10 MMOL/L (ref 7–16)
BUN SERPL-MCNC: 26 MG/DL (ref 6–23)
CALCIUM SERPL-MCNC: 9.1 MG/DL (ref 8.6–10.2)
CHLORIDE SERPL-SCNC: 103 MMOL/L (ref 98–107)
CO2 SERPL-SCNC: 28 MMOL/L (ref 22–29)
CREAT SERPL-MCNC: 0.7 MG/DL (ref 0.5–1)
ERYTHROCYTE [DISTWIDTH] IN BLOOD BY AUTOMATED COUNT: 13.4 FL (ref 11.5–15)
GLUCOSE SERPL-MCNC: 322 MG/DL (ref 74–99)
HCT VFR BLD AUTO: 38 % (ref 34–48)
HGB BLD-MCNC: 11.7 G/DL (ref 11.5–15.5)
MCH RBC QN AUTO: 30.6 PG (ref 26–35)
MCHC RBC AUTO-ENTMCNC: 30.8 % (ref 32–34.5)
MCV RBC AUTO: 99.5 FL (ref 80–99.9)
METER GLUCOSE: 123 MG/DL (ref 74–99)
METER GLUCOSE: 331 MG/DL (ref 74–99)
METER GLUCOSE: 346 MG/DL (ref 74–99)
METER GLUCOSE: 346 MG/DL (ref 74–99)
METER GLUCOSE: 358 MG/DL (ref 74–99)
PLATELET # BLD AUTO: 147 E9/L (ref 130–450)
PMV BLD AUTO: 11.7 FL (ref 7–12)
POTASSIUM SERPL-SCNC: 4.8 MMOL/L (ref 3.5–5)
RBC # BLD AUTO: 3.82 E12/L (ref 3.5–5.5)
SODIUM SERPL-SCNC: 141 MMOL/L (ref 132–146)
WBC # BLD: 8.8 E9/L (ref 4.5–11.5)

## 2023-05-13 PROCEDURE — 6360000004 HC RX CONTRAST MEDICATION: Performed by: RADIOLOGY

## 2023-05-13 PROCEDURE — 6360000002 HC RX W HCPCS: Performed by: INTERNAL MEDICINE

## 2023-05-13 PROCEDURE — 2580000003 HC RX 258

## 2023-05-13 PROCEDURE — 85027 COMPLETE CBC AUTOMATED: CPT

## 2023-05-13 PROCEDURE — 6370000000 HC RX 637 (ALT 250 FOR IP): Performed by: INTERNAL MEDICINE

## 2023-05-13 PROCEDURE — 2700000000 HC OXYGEN THERAPY PER DAY

## 2023-05-13 PROCEDURE — 2580000003 HC RX 258: Performed by: INTERNAL MEDICINE

## 2023-05-13 PROCEDURE — 82962 GLUCOSE BLOOD TEST: CPT

## 2023-05-13 PROCEDURE — 94660 CPAP INITIATION&MGMT: CPT

## 2023-05-13 PROCEDURE — 94640 AIRWAY INHALATION TREATMENT: CPT

## 2023-05-13 PROCEDURE — 6370000000 HC RX 637 (ALT 250 FOR IP): Performed by: NURSE PRACTITIONER

## 2023-05-13 PROCEDURE — 99223 1ST HOSP IP/OBS HIGH 75: CPT | Performed by: INTERNAL MEDICINE

## 2023-05-13 PROCEDURE — 80048 BASIC METABOLIC PNL TOTAL CA: CPT

## 2023-05-13 PROCEDURE — 74175 CTA ABDOMEN W/CONTRAST: CPT

## 2023-05-13 PROCEDURE — 2060000000 HC ICU INTERMEDIATE R&B

## 2023-05-13 PROCEDURE — 36415 COLL VENOUS BLD VENIPUNCTURE: CPT

## 2023-05-13 RX ORDER — INSULIN LISPRO 100 [IU]/ML
6 INJECTION, SOLUTION INTRAVENOUS; SUBCUTANEOUS ONCE
Status: COMPLETED | OUTPATIENT
Start: 2023-05-13 | End: 2023-05-13

## 2023-05-13 RX ORDER — WATER 1000 ML/1000ML
INJECTION, SOLUTION INTRAVENOUS
Status: COMPLETED
Start: 2023-05-13 | End: 2023-05-13

## 2023-05-13 RX ORDER — INSULIN LISPRO 100 [IU]/ML
8 INJECTION, SOLUTION INTRAVENOUS; SUBCUTANEOUS
Status: DISCONTINUED | OUTPATIENT
Start: 2023-05-13 | End: 2023-05-14

## 2023-05-13 RX ADMIN — LEVALBUTEROL 1.25 MG: 1.25 SOLUTION, CONCENTRATE RESPIRATORY (INHALATION) at 08:10

## 2023-05-13 RX ADMIN — INSULIN LISPRO 12 UNITS: 100 INJECTION, SOLUTION INTRAVENOUS; SUBCUTANEOUS at 06:50

## 2023-05-13 RX ADMIN — ARFORMOTEROL TARTRATE 15 MCG: 15 SOLUTION RESPIRATORY (INHALATION) at 18:41

## 2023-05-13 RX ADMIN — GUAIFENESIN 400 MG: 400 TABLET ORAL at 16:52

## 2023-05-13 RX ADMIN — METHYLPREDNISOLONE SODIUM SUCCINATE 40 MG: 40 INJECTION, POWDER, LYOPHILIZED, FOR SOLUTION INTRAMUSCULAR; INTRAVENOUS at 04:10

## 2023-05-13 RX ADMIN — AMLODIPINE BESYLATE 5 MG: 5 TABLET ORAL at 08:05

## 2023-05-13 RX ADMIN — WATER 1 ML: 1 INJECTION INTRAMUSCULAR; INTRAVENOUS; SUBCUTANEOUS at 21:20

## 2023-05-13 RX ADMIN — Medication 10 ML: at 08:07

## 2023-05-13 RX ADMIN — GUAIFENESIN 400 MG: 400 TABLET ORAL at 14:01

## 2023-05-13 RX ADMIN — INSULIN LISPRO 16 UNITS: 100 INJECTION, SOLUTION INTRAVENOUS; SUBCUTANEOUS at 14:02

## 2023-05-13 RX ADMIN — INSULIN LISPRO 8 UNITS: 100 INJECTION, SOLUTION INTRAVENOUS; SUBCUTANEOUS at 16:54

## 2023-05-13 RX ADMIN — GUAIFENESIN 400 MG: 400 TABLET ORAL at 08:05

## 2023-05-13 RX ADMIN — INSULIN LISPRO 12 UNITS: 100 INJECTION, SOLUTION INTRAVENOUS; SUBCUTANEOUS at 16:54

## 2023-05-13 RX ADMIN — GUAIFENESIN 400 MG: 400 TABLET ORAL at 21:19

## 2023-05-13 RX ADMIN — ATORVASTATIN CALCIUM 20 MG: 20 TABLET, FILM COATED ORAL at 08:05

## 2023-05-13 RX ADMIN — INSULIN LISPRO 6 UNITS: 100 INJECTION, SOLUTION INTRAVENOUS; SUBCUTANEOUS at 00:54

## 2023-05-13 RX ADMIN — METHYLPREDNISOLONE SODIUM SUCCINATE 40 MG: 40 INJECTION, POWDER, LYOPHILIZED, FOR SOLUTION INTRAMUSCULAR; INTRAVENOUS at 10:21

## 2023-05-13 RX ADMIN — INSULIN LISPRO 8 UNITS: 100 INJECTION, SOLUTION INTRAVENOUS; SUBCUTANEOUS at 14:03

## 2023-05-13 RX ADMIN — BUDESONIDE 500 MCG: 0.5 SUSPENSION RESPIRATORY (INHALATION) at 08:08

## 2023-05-13 RX ADMIN — PANTOPRAZOLE SODIUM 40 MG: 40 TABLET, DELAYED RELEASE ORAL at 06:29

## 2023-05-13 RX ADMIN — Medication 10 ML: at 21:20

## 2023-05-13 RX ADMIN — CETIRIZINE HYDROCHLORIDE 10 MG: 10 TABLET, FILM COATED ORAL at 08:05

## 2023-05-13 RX ADMIN — LISINOPRIL 20 MG: 20 TABLET ORAL at 08:05

## 2023-05-13 RX ADMIN — IOPAMIDOL 75 ML: 755 INJECTION, SOLUTION INTRAVENOUS at 13:03

## 2023-05-13 RX ADMIN — METHYLPREDNISOLONE SODIUM SUCCINATE 40 MG: 40 INJECTION, POWDER, LYOPHILIZED, FOR SOLUTION INTRAMUSCULAR; INTRAVENOUS at 16:53

## 2023-05-13 RX ADMIN — ENOXAPARIN SODIUM 40 MG: 100 INJECTION SUBCUTANEOUS at 08:05

## 2023-05-13 RX ADMIN — CEFTRIAXONE 1000 MG: 1 INJECTION, POWDER, FOR SOLUTION INTRAMUSCULAR; INTRAVENOUS at 08:06

## 2023-05-13 RX ADMIN — METHYLPREDNISOLONE SODIUM SUCCINATE 40 MG: 40 INJECTION, POWDER, LYOPHILIZED, FOR SOLUTION INTRAMUSCULAR; INTRAVENOUS at 21:19

## 2023-05-13 RX ADMIN — BUDESONIDE 500 MCG: 0.5 SUSPENSION RESPIRATORY (INHALATION) at 18:41

## 2023-05-13 RX ADMIN — ARFORMOTEROL TARTRATE 15 MCG: 15 SOLUTION RESPIRATORY (INHALATION) at 08:08

## 2023-05-13 ASSESSMENT — PAIN SCALES - GENERAL
PAINLEVEL_OUTOF10: 0

## 2023-05-14 ENCOUNTER — APPOINTMENT (OUTPATIENT)
Dept: MRI IMAGING | Age: 68
DRG: 871 | End: 2023-05-14
Payer: MEDICARE

## 2023-05-14 PROBLEM — I10 CHRONIC HYPERTENSION: Status: ACTIVE | Noted: 2023-05-14

## 2023-05-14 PROBLEM — J96.11 CHRONIC RESPIRATORY FAILURE WITH HYPOXIA (HCC): Status: ACTIVE | Noted: 2023-05-14

## 2023-05-14 PROBLEM — E66.01 CLASS 2 SEVERE OBESITY DUE TO EXCESS CALORIES WITH SERIOUS COMORBIDITY AND BODY MASS INDEX (BMI) OF 37.0 TO 37.9 IN ADULT (HCC): Status: ACTIVE | Noted: 2023-05-14

## 2023-05-14 PROBLEM — R93.3 ABNORMAL FINDING ON GI TRACT IMAGING: Status: ACTIVE | Noted: 2023-05-14

## 2023-05-14 PROBLEM — K76.0 HEPATIC STEATOSIS: Status: ACTIVE | Noted: 2023-05-14

## 2023-05-14 PROBLEM — E78.2 COMBINED HYPERLIPIDEMIA: Status: ACTIVE | Noted: 2023-05-14

## 2023-05-14 PROBLEM — E11.9 NON-INSULIN DEPENDENT TYPE 2 DIABETES MELLITUS (HCC): Status: ACTIVE | Noted: 2023-05-14

## 2023-05-14 PROBLEM — K86.2 PANCREAS CYST: Status: ACTIVE | Noted: 2023-05-14

## 2023-05-14 PROBLEM — K86.89 PANCREATIC DUCT DILATED: Status: ACTIVE | Noted: 2023-05-14

## 2023-05-14 PROBLEM — R10.9 RIGHT FLANK PAIN: Status: ACTIVE | Noted: 2023-05-14

## 2023-05-14 PROBLEM — J41.0 SIMPLE CHRONIC BRONCHITIS (HCC): Status: ACTIVE | Noted: 2023-05-14

## 2023-05-14 PROBLEM — J18.9 SEPSIS DUE TO PNEUMONIA (HCC): Status: ACTIVE | Noted: 2023-05-14

## 2023-05-14 PROBLEM — R10.11 RIGHT UPPER QUADRANT ABDOMINAL PAIN: Status: ACTIVE | Noted: 2023-05-14

## 2023-05-14 PROBLEM — A41.9 SEPSIS DUE TO PNEUMONIA (HCC): Status: ACTIVE | Noted: 2023-05-14

## 2023-05-14 LAB
ANION GAP SERPL CALCULATED.3IONS-SCNC: 9 MMOL/L (ref 7–16)
BUN SERPL-MCNC: 35 MG/DL (ref 6–23)
CALCIUM SERPL-MCNC: 9.1 MG/DL (ref 8.6–10.2)
CHLORIDE SERPL-SCNC: 98 MMOL/L (ref 98–107)
CO2 SERPL-SCNC: 29 MMOL/L (ref 22–29)
CREAT SERPL-MCNC: 0.9 MG/DL (ref 0.5–1)
ERYTHROCYTE [DISTWIDTH] IN BLOOD BY AUTOMATED COUNT: 13.4 FL (ref 11.5–15)
GLUCOSE SERPL-MCNC: 329 MG/DL (ref 74–99)
HCT VFR BLD AUTO: 37.1 % (ref 34–48)
HGB BLD-MCNC: 11.6 G/DL (ref 11.5–15.5)
MCH RBC QN AUTO: 31.1 PG (ref 26–35)
MCHC RBC AUTO-ENTMCNC: 31.3 % (ref 32–34.5)
MCV RBC AUTO: 99.5 FL (ref 80–99.9)
METER GLUCOSE: 202 MG/DL (ref 74–99)
METER GLUCOSE: 272 MG/DL (ref 74–99)
METER GLUCOSE: 333 MG/DL (ref 74–99)
METER GLUCOSE: 342 MG/DL (ref 74–99)
PLATELET # BLD AUTO: 153 E9/L (ref 130–450)
PMV BLD AUTO: 11.4 FL (ref 7–12)
POTASSIUM SERPL-SCNC: 5 MMOL/L (ref 3.5–5)
RBC # BLD AUTO: 3.73 E12/L (ref 3.5–5.5)
SODIUM SERPL-SCNC: 136 MMOL/L (ref 132–146)
WBC # BLD: 8.6 E9/L (ref 4.5–11.5)

## 2023-05-14 PROCEDURE — 99233 SBSQ HOSP IP/OBS HIGH 50: CPT | Performed by: INTERNAL MEDICINE

## 2023-05-14 PROCEDURE — 36415 COLL VENOUS BLD VENIPUNCTURE: CPT

## 2023-05-14 PROCEDURE — 6370000000 HC RX 637 (ALT 250 FOR IP): Performed by: INTERNAL MEDICINE

## 2023-05-14 PROCEDURE — 94660 CPAP INITIATION&MGMT: CPT

## 2023-05-14 PROCEDURE — 80048 BASIC METABOLIC PNL TOTAL CA: CPT

## 2023-05-14 PROCEDURE — 2580000003 HC RX 258

## 2023-05-14 PROCEDURE — 6360000002 HC RX W HCPCS: Performed by: INTERNAL MEDICINE

## 2023-05-14 PROCEDURE — 99223 1ST HOSP IP/OBS HIGH 75: CPT | Performed by: STUDENT IN AN ORGANIZED HEALTH CARE EDUCATION/TRAINING PROGRAM

## 2023-05-14 PROCEDURE — 85027 COMPLETE CBC AUTOMATED: CPT

## 2023-05-14 PROCEDURE — 2580000003 HC RX 258: Performed by: INTERNAL MEDICINE

## 2023-05-14 PROCEDURE — 94640 AIRWAY INHALATION TREATMENT: CPT

## 2023-05-14 PROCEDURE — 74183 MRI ABD W/O CNTR FLWD CNTR: CPT

## 2023-05-14 PROCEDURE — 82962 GLUCOSE BLOOD TEST: CPT

## 2023-05-14 PROCEDURE — 2700000000 HC OXYGEN THERAPY PER DAY

## 2023-05-14 PROCEDURE — 2060000000 HC ICU INTERMEDIATE R&B

## 2023-05-14 PROCEDURE — 6360000004 HC RX CONTRAST MEDICATION: Performed by: RADIOLOGY

## 2023-05-14 PROCEDURE — A9579 GAD-BASE MR CONTRAST NOS,1ML: HCPCS | Performed by: RADIOLOGY

## 2023-05-14 RX ORDER — INSULIN LISPRO 100 [IU]/ML
16 INJECTION, SOLUTION INTRAVENOUS; SUBCUTANEOUS
Status: DISCONTINUED | OUTPATIENT
Start: 2023-05-14 | End: 2023-05-15 | Stop reason: HOSPADM

## 2023-05-14 RX ORDER — WATER 1000 ML/1000ML
INJECTION, SOLUTION INTRAVENOUS
Status: COMPLETED
Start: 2023-05-14 | End: 2023-05-14

## 2023-05-14 RX ADMIN — AMLODIPINE BESYLATE 5 MG: 5 TABLET ORAL at 08:25

## 2023-05-14 RX ADMIN — LISINOPRIL 20 MG: 20 TABLET ORAL at 08:25

## 2023-05-14 RX ADMIN — METHYLPREDNISOLONE SODIUM SUCCINATE 40 MG: 40 INJECTION, POWDER, LYOPHILIZED, FOR SOLUTION INTRAMUSCULAR; INTRAVENOUS at 05:02

## 2023-05-14 RX ADMIN — INSULIN LISPRO 16 UNITS: 100 INJECTION, SOLUTION INTRAVENOUS; SUBCUTANEOUS at 16:50

## 2023-05-14 RX ADMIN — WATER 1 ML: 1 INJECTION INTRAMUSCULAR; INTRAVENOUS; SUBCUTANEOUS at 05:02

## 2023-05-14 RX ADMIN — BUDESONIDE 500 MCG: 0.5 SUSPENSION RESPIRATORY (INHALATION) at 08:35

## 2023-05-14 RX ADMIN — BUDESONIDE 500 MCG: 0.5 SUSPENSION RESPIRATORY (INHALATION) at 19:28

## 2023-05-14 RX ADMIN — Medication 10 ML: at 08:26

## 2023-05-14 RX ADMIN — METHYLPREDNISOLONE SODIUM SUCCINATE 40 MG: 40 INJECTION, POWDER, LYOPHILIZED, FOR SOLUTION INTRAMUSCULAR; INTRAVENOUS at 10:49

## 2023-05-14 RX ADMIN — INSULIN LISPRO 12 UNITS: 100 INJECTION, SOLUTION INTRAVENOUS; SUBCUTANEOUS at 06:37

## 2023-05-14 RX ADMIN — ENOXAPARIN SODIUM 40 MG: 100 INJECTION SUBCUTANEOUS at 08:25

## 2023-05-14 RX ADMIN — GUAIFENESIN 400 MG: 400 TABLET ORAL at 20:00

## 2023-05-14 RX ADMIN — Medication 10 ML: at 20:00

## 2023-05-14 RX ADMIN — INSULIN LISPRO 8 UNITS: 100 INJECTION, SOLUTION INTRAVENOUS; SUBCUTANEOUS at 06:37

## 2023-05-14 RX ADMIN — GUAIFENESIN 400 MG: 400 TABLET ORAL at 16:49

## 2023-05-14 RX ADMIN — CEFTRIAXONE 1000 MG: 1 INJECTION, POWDER, FOR SOLUTION INTRAMUSCULAR; INTRAVENOUS at 08:25

## 2023-05-14 RX ADMIN — INSULIN LISPRO 12 UNITS: 100 INJECTION, SOLUTION INTRAVENOUS; SUBCUTANEOUS at 10:57

## 2023-05-14 RX ADMIN — ARFORMOTEROL TARTRATE 15 MCG: 15 SOLUTION RESPIRATORY (INHALATION) at 08:35

## 2023-05-14 RX ADMIN — INSULIN LISPRO 8 UNITS: 100 INJECTION, SOLUTION INTRAVENOUS; SUBCUTANEOUS at 16:49

## 2023-05-14 RX ADMIN — INSULIN LISPRO 8 UNITS: 100 INJECTION, SOLUTION INTRAVENOUS; SUBCUTANEOUS at 10:57

## 2023-05-14 RX ADMIN — ARFORMOTEROL TARTRATE 15 MCG: 15 SOLUTION RESPIRATORY (INHALATION) at 19:28

## 2023-05-14 RX ADMIN — CETIRIZINE HYDROCHLORIDE 10 MG: 10 TABLET, FILM COATED ORAL at 08:25

## 2023-05-14 RX ADMIN — ATORVASTATIN CALCIUM 20 MG: 20 TABLET, FILM COATED ORAL at 08:25

## 2023-05-14 RX ADMIN — GUAIFENESIN 400 MG: 400 TABLET ORAL at 11:44

## 2023-05-14 RX ADMIN — GUAIFENESIN 400 MG: 400 TABLET ORAL at 08:25

## 2023-05-14 RX ADMIN — GADOTERIDOL 19 ML: 279.3 INJECTION, SOLUTION INTRAVENOUS at 16:16

## 2023-05-14 RX ADMIN — PANTOPRAZOLE SODIUM 40 MG: 40 TABLET, DELAYED RELEASE ORAL at 05:02

## 2023-05-15 VITALS
DIASTOLIC BLOOD PRESSURE: 64 MMHG | SYSTOLIC BLOOD PRESSURE: 145 MMHG | HEIGHT: 63 IN | TEMPERATURE: 97.6 F | OXYGEN SATURATION: 99 % | HEART RATE: 83 BPM | RESPIRATION RATE: 20 BRPM | WEIGHT: 220 LBS | BODY MASS INDEX: 38.98 KG/M2

## 2023-05-15 LAB
ANION GAP SERPL CALCULATED.3IONS-SCNC: 8 MMOL/L (ref 7–16)
BUN SERPL-MCNC: 33 MG/DL (ref 6–23)
CALCIUM SERPL-MCNC: 9.2 MG/DL (ref 8.6–10.2)
CHLORIDE SERPL-SCNC: 101 MMOL/L (ref 98–107)
CO2 SERPL-SCNC: 29 MMOL/L (ref 22–29)
CREAT SERPL-MCNC: 0.8 MG/DL (ref 0.5–1)
ERYTHROCYTE [DISTWIDTH] IN BLOOD BY AUTOMATED COUNT: 13.4 FL (ref 11.5–15)
FERRITIN SERPL-MCNC: 338 NG/ML
GLUCOSE SERPL-MCNC: 232 MG/DL (ref 74–99)
HAV IGM SERPL QL IA: NORMAL
HBV CORE IGM SERPL QL IA: NORMAL
HBV SURFACE AG SERPL QL IA: NORMAL
HCT VFR BLD AUTO: 37.3 % (ref 34–48)
HCV AB SERPL QL IA: NORMAL
HGB BLD-MCNC: 11.3 G/DL (ref 11.5–15.5)
IRON SATN MFR SERPL: 67 % (ref 15–50)
IRON SERPL-MCNC: 102 MCG/DL (ref 37–145)
MCH RBC QN AUTO: 30.5 PG (ref 26–35)
MCHC RBC AUTO-ENTMCNC: 30.3 % (ref 32–34.5)
MCV RBC AUTO: 100.5 FL (ref 80–99.9)
METER GLUCOSE: 189 MG/DL (ref 74–99)
METER GLUCOSE: 260 MG/DL (ref 74–99)
PLATELET # BLD AUTO: 154 E9/L (ref 130–450)
PMV BLD AUTO: 11.3 FL (ref 7–12)
POTASSIUM SERPL-SCNC: 4.7 MMOL/L (ref 3.5–5)
RBC # BLD AUTO: 3.71 E12/L (ref 3.5–5.5)
SODIUM SERPL-SCNC: 138 MMOL/L (ref 132–146)
TIBC SERPL-MCNC: 152 MCG/DL (ref 250–450)
WBC # BLD: 7.4 E9/L (ref 4.5–11.5)

## 2023-05-15 PROCEDURE — 6370000000 HC RX 637 (ALT 250 FOR IP): Performed by: INTERNAL MEDICINE

## 2023-05-15 PROCEDURE — 86255 FLUORESCENT ANTIBODY SCREEN: CPT

## 2023-05-15 PROCEDURE — 36415 COLL VENOUS BLD VENIPUNCTURE: CPT

## 2023-05-15 PROCEDURE — 83540 ASSAY OF IRON: CPT

## 2023-05-15 PROCEDURE — 6360000002 HC RX W HCPCS: Performed by: INTERNAL MEDICINE

## 2023-05-15 PROCEDURE — 80074 ACUTE HEPATITIS PANEL: CPT

## 2023-05-15 PROCEDURE — 2580000003 HC RX 258: Performed by: INTERNAL MEDICINE

## 2023-05-15 PROCEDURE — 94640 AIRWAY INHALATION TREATMENT: CPT

## 2023-05-15 PROCEDURE — 85027 COMPLETE CBC AUTOMATED: CPT

## 2023-05-15 PROCEDURE — 2700000000 HC OXYGEN THERAPY PER DAY

## 2023-05-15 PROCEDURE — 83550 IRON BINDING TEST: CPT

## 2023-05-15 PROCEDURE — 99239 HOSP IP/OBS DSCHRG MGMT >30: CPT | Performed by: INTERNAL MEDICINE

## 2023-05-15 PROCEDURE — 83516 IMMUNOASSAY NONANTIBODY: CPT

## 2023-05-15 PROCEDURE — 82728 ASSAY OF FERRITIN: CPT

## 2023-05-15 PROCEDURE — 94660 CPAP INITIATION&MGMT: CPT

## 2023-05-15 PROCEDURE — 82962 GLUCOSE BLOOD TEST: CPT

## 2023-05-15 PROCEDURE — 80048 BASIC METABOLIC PNL TOTAL CA: CPT

## 2023-05-15 PROCEDURE — 82390 ASSAY OF CERULOPLASMIN: CPT

## 2023-05-15 RX ORDER — PREDNISONE 20 MG/1
40 TABLET ORAL DAILY
Qty: 4 TABLET | Refills: 0 | Status: SHIPPED | OUTPATIENT
Start: 2023-05-16 | End: 2023-05-15 | Stop reason: SDUPTHER

## 2023-05-15 RX ORDER — PREDNISONE 20 MG/1
40 TABLET ORAL DAILY
Qty: 4 TABLET | Refills: 0 | Status: SHIPPED | OUTPATIENT
Start: 2023-05-16 | End: 2023-05-18

## 2023-05-15 RX ADMIN — CETIRIZINE HYDROCHLORIDE 10 MG: 10 TABLET, FILM COATED ORAL at 10:12

## 2023-05-15 RX ADMIN — ATORVASTATIN CALCIUM 20 MG: 20 TABLET, FILM COATED ORAL at 10:12

## 2023-05-15 RX ADMIN — PREDNISONE 40 MG: 20 TABLET ORAL at 10:11

## 2023-05-15 RX ADMIN — ENOXAPARIN SODIUM 40 MG: 100 INJECTION SUBCUTANEOUS at 10:12

## 2023-05-15 RX ADMIN — INSULIN LISPRO 8 UNITS: 100 INJECTION, SOLUTION INTRAVENOUS; SUBCUTANEOUS at 11:36

## 2023-05-15 RX ADMIN — CEFTRIAXONE 1000 MG: 1 INJECTION, POWDER, FOR SOLUTION INTRAMUSCULAR; INTRAVENOUS at 10:12

## 2023-05-15 RX ADMIN — ARFORMOTEROL TARTRATE 15 MCG: 15 SOLUTION RESPIRATORY (INHALATION) at 07:57

## 2023-05-15 RX ADMIN — LISINOPRIL 20 MG: 20 TABLET ORAL at 10:12

## 2023-05-15 RX ADMIN — AMLODIPINE BESYLATE 5 MG: 5 TABLET ORAL at 10:11

## 2023-05-15 RX ADMIN — GUAIFENESIN 400 MG: 400 TABLET ORAL at 10:12

## 2023-05-15 RX ADMIN — BUDESONIDE 500 MCG: 0.5 SUSPENSION RESPIRATORY (INHALATION) at 07:57

## 2023-05-15 RX ADMIN — INSULIN LISPRO 16 UNITS: 100 INJECTION, SOLUTION INTRAVENOUS; SUBCUTANEOUS at 11:36

## 2023-05-15 RX ADMIN — PANTOPRAZOLE SODIUM 40 MG: 40 TABLET, DELAYED RELEASE ORAL at 05:58

## 2023-05-15 RX ADMIN — Medication 10 ML: at 10:13

## 2023-05-15 ASSESSMENT — PAIN SCALES - GENERAL: PAINLEVEL_OUTOF10: 0

## 2023-05-15 NOTE — PROGRESS NOTES
Perfect Serve: This patient is eager to go home and is telling the primary team that the plan is for her to follow-up in the office with you  for an ultrasound. I imagine any outstanding [place] [place] could be reviewed at that time. Okay for primary team to discharge from your POV? Electronically signed by Irasema Villar RN on 5/15/2023 at 10:12 AM      Response: 5/15/23 10:21 AM  Yes, Ill arrange for US on outpatient basis. Please remind her to call my office to make an appointment. Thanks!

## 2023-05-15 NOTE — DISCHARGE SUMMARY
[facility] Physician Discharge Summary     John Gorver, 77 y. o.female /  1955  / MRN [id number]    Admission date  2023  Admission diagnoses   RUQ pain with pancreatic ductal dilation  Galan with suspected sepsis d/t CAP w chronic hypoxia  NIDDM  Hx HPL, HTN  Consults   General surgery  Procedures   See hospital course  Discharge date  5/15/2023  11:48 AM  Discharge diagnoses Same, not convinced of CAP  Discharge condition  Stable    Discharge disposition Home  Activity level   As tolerated  Follow-up     No follow-up provider specified. Hospital Course  This patient is a 7year-old female with past medical history pertinent for COPD with chronic hypoxia on 2 L of nasal cannula oxygen, non-insulin-dependent diabetes, hyperlipidemia and hypertension who presented to the emergency room today with complaints of right rib/right upper quadrant/right flank pain. Also complains of shortness of breath and significant exertional dyspnea. Evaluation in the emergency department showed some tachycardia with heart rate up to 117 as well as tachypnea but with stable pressures and oxygen saturations in the high 90s on her home amount of 2 L. Labs showed some CO2 retention at 32, hyperglycemia at 289, but otherwise unremarkable. Negative for COVID, chest x-ray showed bilateral groundglass infiltrates and CTA showed the same without evidence of PE. Right upper quadrant ultrasound showed pancreatic ductal dilatation and borderline prominence of the common bile duct as well as 2 small hypoechoic areas in the pancreas that are of indeterminate etiology and gallbladder sludge with questionable small gallstones. Patient was given DuoNebs, Solu-Medrol, cefepime, doxycycline in the emergency department and was referred for admission for further evaluation and treatment.     Triphasic CT was ordered and showed a 1.3 cm fluid density in the pancreatic body versus tail, IPMN versus benign cyst, as

## 2023-05-15 NOTE — PROGRESS NOTES
Pt and spouse given and explained all discharge instructions. All questions answered to satisfaction. Stressed the importance of all follow up doctor appts and medication compliance. Pt. In understanding.  wheeled patient out in wheel chair.

## 2023-05-15 NOTE — PROGRESS NOTES
Physician Progress Note      PATIENTSiegfried Crigler Imogene Neer  CSN #:                  [id number]  :                       1955  ADMIT DATE:       2023 7:35 AM  DISCH DATE:  RESPONDING  PROVIDER #:        Yaima Boyd DO          QUERY TEXT:    Pt admitted with sepsis, pneumonia. Pt noted to have COPD and uses home   oxygen at [facility]. If possible, please document in the progress notes and   discharge summary if you are evaluating and/or treating any of the following: The medical record reflects the following:  Risk Factors: COPD  Clinical Indicators: Pt with h/o COPD on home oxygen at [facility]. No increase in   amount of oxygen used  ED \"Patient is on chronic O2. When she ambulates she drops to the 70s. \"  H&P \"COPD with chronic hypoxia on 2 L of nasal cannula oxygen. \"  Treatment: continue oxygen as at home, abx, steroids, IS, Pulmicort, Brovana,   Xopenex    Thank you,  Mitra Ortega RN, CCDS  Clinical Documentation   [email or web address]  Options provided:  -- Chronic respiratory failure with hypoxia  -- Chronic respiratory failure with hypercapnia  -- Chronic respiratory failure with hypoxia and hypercapnia  -- Other - I will add my own diagnosis  -- Disagree - Not applicable / Not valid  -- Disagree - Clinically unable to determine / Unknown  -- Refer to Clinical Documentation Reviewer    PROVIDER RESPONSE TEXT:    This patient has chronic respiratory failure with hypoxia.     Query created by: Fern Ferreira on 5/15/2023 11:10 AM      Electronically signed by:  Moi Roman DO 5/15/2023 11:15 AM

## 2023-05-17 LAB
BACTERIA BLD CULT ORG #2: NORMAL
BACTERIA BLD CULT: NORMAL
CERULOPLASMIN SERPL-MCNC: 25 MG/DL (ref 16–45)
SMOOTH MUSCLE ANTIBODY: NEGATIVE

## 2023-05-18 LAB — MITOCHONDRIA M2 AB SER IA-ACNC: 0.6 U/ML (ref 0–4)

## 2023-12-26 ENCOUNTER — HOSPITAL ENCOUNTER (EMERGENCY)
Age: 68
Discharge: LEFT BEFORE DISCHARGE | End: 2023-12-26
Payer: MEDICARE

## 2023-12-26 ENCOUNTER — APPOINTMENT (OUTPATIENT)
Dept: GENERAL RADIOLOGY | Age: 68
End: 2023-12-26
Payer: MEDICARE

## 2023-12-26 VITALS
RESPIRATION RATE: 20 BRPM | HEIGHT: 63 IN | DIASTOLIC BLOOD PRESSURE: 65 MMHG | SYSTOLIC BLOOD PRESSURE: 138 MMHG | HEART RATE: 108 BPM | TEMPERATURE: 97.3 F | OXYGEN SATURATION: 98 % | BODY MASS INDEX: 36.32 KG/M2 | WEIGHT: 205 LBS

## 2023-12-26 PROCEDURE — 99281 EMR DPT VST MAYX REQ PHY/QHP: CPT

## 2023-12-26 NOTE — ED TRIAGE NOTES
FIRST PROVIDER CONTACT ASSESSMENT NOTE       Department of Emergency Medicine                 First Provider Note            23  3:32 PM EST    Date of Encounter: No admission date for patient encounter. Patient Name: Negin Capps  : 1955  MRN: [id number]    Chief Complaint: Shortness of Breath and Cough (Usually wears 2 liters o2 at home and is now wearing 3. Worse on exertion )      History of Present Illness:   Negin Capps is a 77 y.o. female who presents to the ED for SOB, cough. Concerned about pneumonia. Typically wears 2 L of oxygen. Now on 3 L. States she turned it up because she was SOB. O2 was 91/92 on 2L     Focused Physical Exam:  VS:    ED Triage Vitals   BP Temp Temp src Pulse Resp SpO2 Height Weight   -- -- -- -- -- -- -- --        Physical Ex: Constitutional: Alert and non-toxic. Medical History:  has a past medical history of COPD (chronic obstructive pulmonary disease) ([address]), Diabetes mellitus ([address]), Hyperlipidemia, and Hypertension. Surgical History:  has a past surgical history that includes  section. Social History:  reports that she quit smoking about 11 years ago. Her smoking use included cigarettes. She started smoking about 53 years ago. She has a 41.0 pack-year smoking history. She has never used smokeless tobacco. She reports that she does not currently use alcohol. She reports that she does not use drugs. Family History: family history includes COPD in her father; Cancer in her mother.     Allergies: Aspirin     Initial Plan of Care: Initiate Treatment-Testing, Proceed toTreatment Area When Bed Available for ED Attending/MLP to Continue Care      ---END OF FIRST PROVIDER CONTACT ASSESSMENT NOTE---  Electronically signed by Gretchen Najera PA-C   DD: 23

## 2024-01-03 ENCOUNTER — APPOINTMENT (OUTPATIENT)
Dept: GENERAL RADIOLOGY | Age: 69
DRG: 190 | End: 2024-01-03
Payer: MEDICARE

## 2024-01-03 ENCOUNTER — APPOINTMENT (OUTPATIENT)
Dept: CT IMAGING | Age: 69
DRG: 190 | End: 2024-01-03
Payer: MEDICARE

## 2024-01-03 ENCOUNTER — HOSPITAL ENCOUNTER (INPATIENT)
Age: 69
LOS: 1 days | Discharge: HOME | DRG: 190 | End: 2024-01-06
Attending: EMERGENCY MEDICINE | Admitting: STUDENT IN AN ORGANIZED HEALTH CARE EDUCATION/TRAINING PROGRAM
Payer: MEDICARE

## 2024-01-03 DIAGNOSIS — J96.01 ACUTE RESPIRATORY FAILURE WITH HYPOXIA (HCC): Primary | ICD-10-CM

## 2024-01-03 DIAGNOSIS — R91.8 MULTIPLE LUNG NODULES: ICD-10-CM

## 2024-01-03 DIAGNOSIS — J44.9 CHRONIC OBSTRUCTIVE PULMONARY DISEASE, UNSPECIFIED COPD TYPE (HCC): ICD-10-CM

## 2024-01-03 LAB
ALBUMIN SERPL-MCNC: 4 G/DL (ref 3.5–5.2)
ALP SERPL-CCNC: 137 U/L (ref 35–104)
ALT SERPL-CCNC: 34 U/L (ref 0–32)
ANION GAP SERPL CALCULATED.3IONS-SCNC: 12 MMOL/L (ref 7–16)
AST SERPL-CCNC: 23 U/L (ref 0–31)
B PARAP IS1001 DNA NPH QL NAA+NON-PROBE: NOT DETECTED
B PERT DNA SPEC QL NAA+PROBE: NOT DETECTED
BASOPHILS # BLD: 0.02 K/UL (ref 0–0.2)
BASOPHILS NFR BLD: 0 % (ref 0–2)
BILIRUB SERPL-MCNC: 0.8 MG/DL (ref 0–1.2)
BNP SERPL-MCNC: 418 PG/ML (ref 0–125)
BUN SERPL-MCNC: 22 MG/DL (ref 6–23)
C PNEUM DNA NPH QL NAA+NON-PROBE: NOT DETECTED
CALCIUM SERPL-MCNC: 9.5 MG/DL (ref 8.6–10.2)
CHLORIDE SERPL-SCNC: 96 MMOL/L (ref 98–107)
CO2 SERPL-SCNC: 28 MMOL/L (ref 22–29)
CREAT SERPL-MCNC: 0.8 MG/DL (ref 0.5–1)
EOSINOPHIL # BLD: 0.03 K/UL (ref 0.05–0.5)
EOSINOPHILS RELATIVE PERCENT: 0 % (ref 0–6)
ERYTHROCYTE [DISTWIDTH] IN BLOOD BY AUTOMATED COUNT: 13.6 % (ref 11.5–15)
FLOW RATE: 3
FLUAV RNA NPH QL NAA+NON-PROBE: NOT DETECTED
FLUBV RNA NPH QL NAA+NON-PROBE: NOT DETECTED
GFR SERPL CREATININE-BSD FRML MDRD: >60 ML/MIN/1.73M2
GLUCOSE SERPL-MCNC: 200 MG/DL (ref 74–99)
HADV DNA NPH QL NAA+NON-PROBE: NOT DETECTED
HCOV 229E RNA NPH QL NAA+NON-PROBE: NOT DETECTED
HCOV HKU1 RNA NPH QL NAA+NON-PROBE: NOT DETECTED
HCOV NL63 RNA NPH QL NAA+NON-PROBE: NOT DETECTED
HCOV OC43 RNA NPH QL NAA+NON-PROBE: NOT DETECTED
HCT VFR BLD AUTO: 40.5 % (ref 34–48)
HGB BLD-MCNC: 12.9 G/DL (ref 11.5–15.5)
HMPV RNA NPH QL NAA+NON-PROBE: NOT DETECTED
HPIV1 RNA NPH QL NAA+NON-PROBE: NOT DETECTED
HPIV2 RNA NPH QL NAA+NON-PROBE: NOT DETECTED
HPIV3 RNA NPH QL NAA+NON-PROBE: NOT DETECTED
HPIV4 RNA NPH QL NAA+NON-PROBE: NOT DETECTED
IMM GRANULOCYTES # BLD AUTO: 0.15 K/UL (ref 0–0.58)
IMM GRANULOCYTES NFR BLD: 1 % (ref 0–5)
INFLUENZA A BY PCR: NOT DETECTED
INFLUENZA B BY PCR: NOT DETECTED
INR PPP: 1.3
LACTATE BLDV-SCNC: 1.6 MMOL/L (ref 0.5–1.9)
LACTATE BLDV-SCNC: 2 MMOL/L (ref 0.5–1.9)
LACTATE BLDV-SCNC: 2 MMOL/L (ref 0.5–2.2)
LIPASE SERPL-CCNC: 28 U/L (ref 13–60)
LYMPHOCYTES NFR BLD: 1.9 K/UL (ref 1.5–4)
LYMPHOCYTES RELATIVE PERCENT: 10 % (ref 20–42)
M PNEUMO DNA NPH QL NAA+NON-PROBE: NOT DETECTED
MAGNESIUM SERPL-MCNC: 1.9 MG/DL (ref 1.6–2.6)
MCH RBC QN AUTO: 31.7 PG (ref 26–35)
MCHC RBC AUTO-ENTMCNC: 31.9 G/DL (ref 32–34.5)
MCV RBC AUTO: 99.5 FL (ref 80–99.9)
MONOCYTES NFR BLD: 0.79 K/UL (ref 0.1–0.95)
MONOCYTES NFR BLD: 4 % (ref 2–12)
NEUTROPHILS NFR BLD: 85 % (ref 43–80)
NEUTS SEG NFR BLD: 16.66 K/UL (ref 1.8–7.3)
O2 DELIVERY DEVICE: ABNORMAL
PLATELET # BLD AUTO: 182 K/UL (ref 130–450)
PMV BLD AUTO: 11.4 FL (ref 7–12)
POC HCO3: 28 MMOL/L (ref 22–26)
POC O2 SATURATION: 97.2 % (ref 92–98.5)
POC PCO2: 44.5 MM HG (ref 35–45)
POC PH: 7.41 (ref 7.35–7.45)
POC PO2: 92.9 MM HG (ref 60–80)
POSITIVE BASE EXCESS, ART: 2.7 MMOL/L (ref 0–3)
POTASSIUM SERPL-SCNC: 4.8 MMOL/L (ref 3.5–5)
PROCALCITONIN SERPL-MCNC: 0.27 NG/ML (ref 0–0.08)
PROT SERPL-MCNC: 7.5 G/DL (ref 6.4–8.3)
PROTHROMBIN TIME: 14.9 SEC (ref 9.3–12.4)
RBC # BLD AUTO: 4.07 M/UL (ref 3.5–5.5)
RSV BY PCR: NOT DETECTED
RSV RNA NPH QL NAA+NON-PROBE: NOT DETECTED
RV+EV RNA NPH QL NAA+NON-PROBE: NOT DETECTED
SARS-COV-2 RDRP RESP QL NAA+PROBE: NOT DETECTED
SARS-COV-2 RNA NPH QL NAA+NON-PROBE: NOT DETECTED
SODIUM SERPL-SCNC: 136 MMOL/L (ref 132–146)
SPECIMEN DESCRIPTION: NORMAL
SPECIMEN DESCRIPTION: NORMAL
SPECIMEN SOURCE: NORMAL
TROPONIN I SERPL HS-MCNC: 13 NG/L (ref 0–9)
TROPONIN I SERPL HS-MCNC: 15 NG/L (ref 0–9)
TSH SERPL DL<=0.05 MIU/L-ACNC: 2.56 UIU/ML (ref 0.27–4.2)
WBC OTHER # BLD: 19.6 K/UL (ref 4.5–11.5)

## 2024-01-03 PROCEDURE — 83690 ASSAY OF LIPASE: CPT

## 2024-01-03 PROCEDURE — 84443 ASSAY THYROID STIM HORMONE: CPT

## 2024-01-03 PROCEDURE — 83605 ASSAY OF LACTIC ACID: CPT

## 2024-01-03 PROCEDURE — 94640 AIRWAY INHALATION TREATMENT: CPT

## 2024-01-03 PROCEDURE — 71046 X-RAY EXAM CHEST 2 VIEWS: CPT

## 2024-01-03 PROCEDURE — 82803 BLOOD GASES ANY COMBINATION: CPT

## 2024-01-03 PROCEDURE — 99285 EMERGENCY DEPT VISIT HI MDM: CPT

## 2024-01-03 PROCEDURE — 6370000000 HC RX 637 (ALT 250 FOR IP): Performed by: EMERGENCY MEDICINE

## 2024-01-03 PROCEDURE — 83880 ASSAY OF NATRIURETIC PEPTIDE: CPT

## 2024-01-03 PROCEDURE — 83735 ASSAY OF MAGNESIUM: CPT

## 2024-01-03 PROCEDURE — 87634 RSV DNA/RNA AMP PROBE: CPT

## 2024-01-03 PROCEDURE — 80053 COMPREHEN METABOLIC PANEL: CPT

## 2024-01-03 PROCEDURE — 87502 INFLUENZA DNA AMP PROBE: CPT

## 2024-01-03 PROCEDURE — 85610 PROTHROMBIN TIME: CPT

## 2024-01-03 PROCEDURE — 85025 COMPLETE CBC W/AUTO DIFF WBC: CPT

## 2024-01-03 PROCEDURE — 84145 PROCALCITONIN (PCT): CPT

## 2024-01-03 PROCEDURE — 0202U NFCT DS 22 TRGT SARS-COV-2: CPT

## 2024-01-03 PROCEDURE — 71275 CT ANGIOGRAPHY CHEST: CPT

## 2024-01-03 PROCEDURE — 94664 DEMO&/EVAL PT USE INHALER: CPT

## 2024-01-03 PROCEDURE — 96365 THER/PROPH/DIAG IV INF INIT: CPT

## 2024-01-03 PROCEDURE — 6360000002 HC RX W HCPCS: Performed by: EMERGENCY MEDICINE

## 2024-01-03 PROCEDURE — 93005 ELECTROCARDIOGRAM TRACING: CPT | Performed by: EMERGENCY MEDICINE

## 2024-01-03 PROCEDURE — 84484 ASSAY OF TROPONIN QUANT: CPT

## 2024-01-03 PROCEDURE — 2580000003 HC RX 258: Performed by: EMERGENCY MEDICINE

## 2024-01-03 PROCEDURE — 87635 SARS-COV-2 COVID-19 AMP PRB: CPT

## 2024-01-03 PROCEDURE — 96375 TX/PRO/DX INJ NEW DRUG ADDON: CPT

## 2024-01-03 PROCEDURE — 6360000004 HC RX CONTRAST MEDICATION: Performed by: RADIOLOGY

## 2024-01-03 PROCEDURE — 87040 BLOOD CULTURE FOR BACTERIA: CPT

## 2024-01-03 RX ORDER — SODIUM CHLORIDE 9 MG/ML
INJECTION, SOLUTION INTRAVENOUS ONCE
Status: COMPLETED | OUTPATIENT
Start: 2024-01-03 | End: 2024-01-03

## 2024-01-03 RX ORDER — MAGNESIUM SULFATE IN WATER 40 MG/ML
2000 INJECTION, SOLUTION INTRAVENOUS ONCE
Status: COMPLETED | OUTPATIENT
Start: 2024-01-03 | End: 2024-01-03

## 2024-01-03 RX ORDER — IPRATROPIUM BROMIDE AND ALBUTEROL SULFATE 2.5; .5 MG/3ML; MG/3ML
1 SOLUTION RESPIRATORY (INHALATION)
Status: COMPLETED | OUTPATIENT
Start: 2024-01-03 | End: 2024-01-03

## 2024-01-03 RX ORDER — DEXAMETHASONE SODIUM PHOSPHATE 10 MG/ML
10 INJECTION INTRAMUSCULAR; INTRAVENOUS ONCE
Status: COMPLETED | OUTPATIENT
Start: 2024-01-03 | End: 2024-01-03

## 2024-01-03 RX ORDER — SODIUM CHLORIDE 0.9 % (FLUSH) 0.9 %
SYRINGE (ML) INJECTION
Status: DISPENSED
Start: 2024-01-03 | End: 2024-01-04

## 2024-01-03 RX ADMIN — IPRATROPIUM BROMIDE AND ALBUTEROL SULFATE 1 DOSE: .5; 3 SOLUTION RESPIRATORY (INHALATION) at 21:58

## 2024-01-03 RX ADMIN — DEXAMETHASONE SODIUM PHOSPHATE 10 MG: 10 INJECTION INTRAMUSCULAR; INTRAVENOUS at 16:49

## 2024-01-03 RX ADMIN — IOPAMIDOL 75 ML: 755 INJECTION, SOLUTION INTRAVENOUS at 19:10

## 2024-01-03 RX ADMIN — IPRATROPIUM BROMIDE AND ALBUTEROL SULFATE 1 DOSE: .5; 2.5 SOLUTION RESPIRATORY (INHALATION) at 22:47

## 2024-01-03 RX ADMIN — SODIUM CHLORIDE: 9 INJECTION, SOLUTION INTRAVENOUS at 21:33

## 2024-01-03 RX ADMIN — IPRATROPIUM BROMIDE AND ALBUTEROL SULFATE 1 DOSE: .5; 3 SOLUTION RESPIRATORY (INHALATION) at 21:56

## 2024-01-03 RX ADMIN — MAGNESIUM SULFATE HEPTAHYDRATE 2000 MG: 40 INJECTION, SOLUTION INTRAVENOUS at 21:32

## 2024-01-03 RX ADMIN — IPRATROPIUM BROMIDE AND ALBUTEROL SULFATE 1 DOSE: .5; 2.5 SOLUTION RESPIRATORY (INHALATION) at 22:46

## 2024-01-03 RX ADMIN — IPRATROPIUM BROMIDE AND ALBUTEROL SULFATE 1 DOSE: .5; 3 SOLUTION RESPIRATORY (INHALATION) at 21:57

## 2024-01-03 RX ADMIN — IPRATROPIUM BROMIDE AND ALBUTEROL SULFATE 1 DOSE: .5; 2.5 SOLUTION RESPIRATORY (INHALATION) at 22:45

## 2024-01-03 ASSESSMENT — ENCOUNTER SYMPTOMS
COUGH: 1
NAUSEA: 0
ABDOMINAL PAIN: 0
BACK PAIN: 0
SHORTNESS OF BREATH: 1
DIARRHEA: 0
PHOTOPHOBIA: 0

## 2024-01-03 ASSESSMENT — PAIN - FUNCTIONAL ASSESSMENT
PAIN_FUNCTIONAL_ASSESSMENT: NONE - DENIES PAIN
PAIN_FUNCTIONAL_ASSESSMENT: NONE - DENIES PAIN

## 2024-01-04 LAB
ANION GAP SERPL CALCULATED.3IONS-SCNC: 13 MMOL/L (ref 7–16)
BASOPHILS # BLD: 0.01 K/UL (ref 0–0.2)
BASOPHILS NFR BLD: 0 % (ref 0–2)
BUN SERPL-MCNC: 30 MG/DL (ref 6–23)
CALCIUM SERPL-MCNC: 9.1 MG/DL (ref 8.6–10.2)
CHLORIDE SERPL-SCNC: 97 MMOL/L (ref 98–107)
CO2 SERPL-SCNC: 24 MMOL/L (ref 22–29)
CREAT SERPL-MCNC: 0.9 MG/DL (ref 0.5–1)
EKG ATRIAL RATE: 112 BPM
EKG P AXIS: 70 DEGREES
EKG P-R INTERVAL: 126 MS
EKG Q-T INTERVAL: 324 MS
EKG QRS DURATION: 80 MS
EKG QTC CALCULATION (BAZETT): 442 MS
EKG R AXIS: 68 DEGREES
EKG T AXIS: 71 DEGREES
EKG VENTRICULAR RATE: 112 BPM
EOSINOPHIL # BLD: 0 K/UL (ref 0.05–0.5)
EOSINOPHILS RELATIVE PERCENT: 0 % (ref 0–6)
ERYTHROCYTE [DISTWIDTH] IN BLOOD BY AUTOMATED COUNT: 13.6 % (ref 11.5–15)
GFR SERPL CREATININE-BSD FRML MDRD: >60 ML/MIN/1.73M2
GLUCOSE BLD-MCNC: 179 MG/DL (ref 74–99)
GLUCOSE BLD-MCNC: 220 MG/DL (ref 74–99)
GLUCOSE SERPL-MCNC: 353 MG/DL (ref 74–99)
HCT VFR BLD AUTO: 35.4 % (ref 34–48)
HGB BLD-MCNC: 11.2 G/DL (ref 11.5–15.5)
IMM GRANULOCYTES # BLD AUTO: 0.1 K/UL (ref 0–0.58)
IMM GRANULOCYTES NFR BLD: 1 % (ref 0–5)
LYMPHOCYTES NFR BLD: 0.56 K/UL (ref 1.5–4)
LYMPHOCYTES RELATIVE PERCENT: 5 % (ref 20–42)
MCH RBC QN AUTO: 31.5 PG (ref 26–35)
MCHC RBC AUTO-ENTMCNC: 31.6 G/DL (ref 32–34.5)
MCV RBC AUTO: 99.7 FL (ref 80–99.9)
MONOCYTES NFR BLD: 0.13 K/UL (ref 0.1–0.95)
MONOCYTES NFR BLD: 1 % (ref 2–12)
NEUTROPHILS NFR BLD: 93 % (ref 43–80)
NEUTS SEG NFR BLD: 10.97 K/UL (ref 1.8–7.3)
PLATELET, FLUORESCENCE: 123 K/UL (ref 130–450)
PMV BLD AUTO: 11 FL (ref 7–12)
POTASSIUM SERPL-SCNC: 4.8 MMOL/L (ref 3.5–5)
RBC # BLD AUTO: 3.55 M/UL (ref 3.5–5.5)
RBC # BLD: ABNORMAL 10*6/UL
RBC # BLD: ABNORMAL 10*6/UL
SODIUM SERPL-SCNC: 134 MMOL/L (ref 132–146)
WBC OTHER # BLD: 11.8 K/UL (ref 4.5–11.5)

## 2024-01-04 PROCEDURE — 96372 THER/PROPH/DIAG INJ SC/IM: CPT

## 2024-01-04 PROCEDURE — 99222 1ST HOSP IP/OBS MODERATE 55: CPT | Performed by: STUDENT IN AN ORGANIZED HEALTH CARE EDUCATION/TRAINING PROGRAM

## 2024-01-04 PROCEDURE — 82962 GLUCOSE BLOOD TEST: CPT

## 2024-01-04 PROCEDURE — 6370000000 HC RX 637 (ALT 250 FOR IP): Performed by: STUDENT IN AN ORGANIZED HEALTH CARE EDUCATION/TRAINING PROGRAM

## 2024-01-04 PROCEDURE — 85025 COMPLETE CBC W/AUTO DIFF WBC: CPT

## 2024-01-04 PROCEDURE — 94640 AIRWAY INHALATION TREATMENT: CPT

## 2024-01-04 PROCEDURE — G0378 HOSPITAL OBSERVATION PER HR: HCPCS

## 2024-01-04 PROCEDURE — 2580000003 HC RX 258: Performed by: STUDENT IN AN ORGANIZED HEALTH CARE EDUCATION/TRAINING PROGRAM

## 2024-01-04 PROCEDURE — 93010 ELECTROCARDIOGRAM REPORT: CPT | Performed by: INTERNAL MEDICINE

## 2024-01-04 PROCEDURE — 6360000002 HC RX W HCPCS: Performed by: STUDENT IN AN ORGANIZED HEALTH CARE EDUCATION/TRAINING PROGRAM

## 2024-01-04 PROCEDURE — 80048 BASIC METABOLIC PNL TOTAL CA: CPT

## 2024-01-04 RX ORDER — ONDANSETRON 2 MG/ML
4 INJECTION INTRAMUSCULAR; INTRAVENOUS EVERY 6 HOURS PRN
Status: DISCONTINUED | OUTPATIENT
Start: 2024-01-04 | End: 2024-01-06 | Stop reason: HOSPADM

## 2024-01-04 RX ORDER — DEXTROSE MONOHYDRATE 100 MG/ML
INJECTION, SOLUTION INTRAVENOUS CONTINUOUS PRN
Status: DISCONTINUED | OUTPATIENT
Start: 2024-01-04 | End: 2024-01-06 | Stop reason: HOSPADM

## 2024-01-04 RX ORDER — ONDANSETRON 4 MG/1
4 TABLET, ORALLY DISINTEGRATING ORAL EVERY 8 HOURS PRN
Status: DISCONTINUED | OUTPATIENT
Start: 2024-01-04 | End: 2024-01-06 | Stop reason: HOSPADM

## 2024-01-04 RX ORDER — MAGNESIUM SULFATE IN WATER 40 MG/ML
2000 INJECTION, SOLUTION INTRAVENOUS PRN
Status: DISCONTINUED | OUTPATIENT
Start: 2024-01-04 | End: 2024-01-06 | Stop reason: HOSPADM

## 2024-01-04 RX ORDER — POTASSIUM CHLORIDE 7.45 MG/ML
10 INJECTION INTRAVENOUS PRN
Status: DISCONTINUED | OUTPATIENT
Start: 2024-01-04 | End: 2024-01-06 | Stop reason: HOSPADM

## 2024-01-04 RX ORDER — SODIUM CHLORIDE 0.9 % (FLUSH) 0.9 %
5-40 SYRINGE (ML) INJECTION EVERY 12 HOURS SCHEDULED
Status: DISCONTINUED | OUTPATIENT
Start: 2024-01-04 | End: 2024-01-06 | Stop reason: HOSPADM

## 2024-01-04 RX ORDER — ATORVASTATIN CALCIUM 10 MG/1
10 TABLET, FILM COATED ORAL DAILY
Status: DISCONTINUED | OUTPATIENT
Start: 2024-01-04 | End: 2024-01-06 | Stop reason: HOSPADM

## 2024-01-04 RX ORDER — PANTOPRAZOLE SODIUM 40 MG/1
40 TABLET, DELAYED RELEASE ORAL
Status: DISCONTINUED | OUTPATIENT
Start: 2024-01-04 | End: 2024-01-06 | Stop reason: HOSPADM

## 2024-01-04 RX ORDER — SODIUM CHLORIDE 9 MG/ML
INJECTION, SOLUTION INTRAVENOUS PRN
Status: DISCONTINUED | OUTPATIENT
Start: 2024-01-04 | End: 2024-01-06 | Stop reason: HOSPADM

## 2024-01-04 RX ORDER — ACETAMINOPHEN 325 MG/1
650 TABLET ORAL EVERY 6 HOURS PRN
Status: DISCONTINUED | OUTPATIENT
Start: 2024-01-04 | End: 2024-01-06 | Stop reason: HOSPADM

## 2024-01-04 RX ORDER — ENOXAPARIN SODIUM 100 MG/ML
40 INJECTION SUBCUTANEOUS DAILY
Status: DISCONTINUED | OUTPATIENT
Start: 2024-01-04 | End: 2024-01-06 | Stop reason: HOSPADM

## 2024-01-04 RX ORDER — PREDNISONE 20 MG/1
40 TABLET ORAL DAILY
Status: DISCONTINUED | OUTPATIENT
Start: 2024-01-04 | End: 2024-01-04

## 2024-01-04 RX ORDER — SODIUM CHLORIDE 0.9 % (FLUSH) 0.9 %
5-40 SYRINGE (ML) INJECTION PRN
Status: DISCONTINUED | OUTPATIENT
Start: 2024-01-04 | End: 2024-01-06 | Stop reason: HOSPADM

## 2024-01-04 RX ORDER — LISINOPRIL 10 MG/1
20 TABLET ORAL EVERY MORNING
Status: DISCONTINUED | OUTPATIENT
Start: 2024-01-04 | End: 2024-01-06 | Stop reason: HOSPADM

## 2024-01-04 RX ORDER — AMLODIPINE BESYLATE 5 MG/1
5 TABLET ORAL EVERY MORNING
Status: DISCONTINUED | OUTPATIENT
Start: 2024-01-04 | End: 2024-01-06 | Stop reason: HOSPADM

## 2024-01-04 RX ORDER — INSULIN LISPRO 100 [IU]/ML
0-4 INJECTION, SOLUTION INTRAVENOUS; SUBCUTANEOUS NIGHTLY
Status: DISCONTINUED | OUTPATIENT
Start: 2024-01-04 | End: 2024-01-06 | Stop reason: HOSPADM

## 2024-01-04 RX ORDER — METHYLPREDNISOLONE SODIUM SUCCINATE 40 MG/ML
40 INJECTION, POWDER, LYOPHILIZED, FOR SOLUTION INTRAMUSCULAR; INTRAVENOUS EVERY 12 HOURS
Status: DISCONTINUED | OUTPATIENT
Start: 2024-01-05 | End: 2024-01-05

## 2024-01-04 RX ORDER — BUDESONIDE 0.5 MG/2ML
1 INHALANT ORAL 2 TIMES DAILY
COMMUNITY

## 2024-01-04 RX ORDER — LANOLIN ALCOHOL/MO/W.PET/CERES
3 CREAM (GRAM) TOPICAL NIGHTLY PRN
Status: DISCONTINUED | OUTPATIENT
Start: 2024-01-04 | End: 2024-01-06 | Stop reason: HOSPADM

## 2024-01-04 RX ORDER — ACETAMINOPHEN 650 MG/1
650 SUPPOSITORY RECTAL EVERY 6 HOURS PRN
Status: DISCONTINUED | OUTPATIENT
Start: 2024-01-04 | End: 2024-01-06 | Stop reason: HOSPADM

## 2024-01-04 RX ORDER — PREDNISONE 5 MG/1
10 TABLET ORAL DAILY
Status: DISCONTINUED | OUTPATIENT
Start: 2024-01-13 | End: 2024-01-04

## 2024-01-04 RX ORDER — AZITHROMYCIN 250 MG/1
500 TABLET, FILM COATED ORAL DAILY
Status: DISCONTINUED | OUTPATIENT
Start: 2024-01-04 | End: 2024-01-06 | Stop reason: HOSPADM

## 2024-01-04 RX ORDER — POTASSIUM CHLORIDE 20 MEQ/1
40 TABLET, EXTENDED RELEASE ORAL PRN
Status: DISCONTINUED | OUTPATIENT
Start: 2024-01-04 | End: 2024-01-06 | Stop reason: HOSPADM

## 2024-01-04 RX ORDER — METFORMIN HYDROCHLORIDE 500 MG/1
1000 TABLET, EXTENDED RELEASE ORAL 2 TIMES DAILY WITH MEALS
COMMUNITY

## 2024-01-04 RX ORDER — IPRATROPIUM BROMIDE AND ALBUTEROL SULFATE 2.5; .5 MG/3ML; MG/3ML
1 SOLUTION RESPIRATORY (INHALATION)
Status: DISCONTINUED | OUTPATIENT
Start: 2024-01-04 | End: 2024-01-06 | Stop reason: HOSPADM

## 2024-01-04 RX ORDER — GLIPIZIDE 5 MG/1
5 TABLET ORAL
COMMUNITY

## 2024-01-04 RX ORDER — PREDNISONE 5 MG/1
5 TABLET ORAL DAILY
Status: DISCONTINUED | OUTPATIENT
Start: 2024-01-16 | End: 2024-01-04

## 2024-01-04 RX ORDER — INSULIN LISPRO 100 [IU]/ML
0-8 INJECTION, SOLUTION INTRAVENOUS; SUBCUTANEOUS
Status: DISCONTINUED | OUTPATIENT
Start: 2024-01-04 | End: 2024-01-06 | Stop reason: HOSPADM

## 2024-01-04 RX ORDER — PREDNISONE 2.5 MG/1
2.5 TABLET ORAL EVERY MORNING
COMMUNITY

## 2024-01-04 RX ORDER — POLYETHYLENE GLYCOL 3350 17 G/17G
17 POWDER, FOR SOLUTION ORAL DAILY PRN
Status: DISCONTINUED | OUTPATIENT
Start: 2024-01-04 | End: 2024-01-06 | Stop reason: HOSPADM

## 2024-01-04 RX ORDER — PREDNISONE 20 MG/1
20 TABLET ORAL DAILY
Status: DISCONTINUED | OUTPATIENT
Start: 2024-01-10 | End: 2024-01-04

## 2024-01-04 RX ADMIN — IPRATROPIUM BROMIDE AND ALBUTEROL SULFATE 1 DOSE: .5; 2.5 SOLUTION RESPIRATORY (INHALATION) at 19:37

## 2024-01-04 RX ADMIN — SODIUM CHLORIDE, PRESERVATIVE FREE 10 ML: 5 INJECTION INTRAVENOUS at 20:45

## 2024-01-04 RX ADMIN — SODIUM CHLORIDE, PRESERVATIVE FREE 10 ML: 5 INJECTION INTRAVENOUS at 16:20

## 2024-01-04 RX ADMIN — IPRATROPIUM BROMIDE AND ALBUTEROL SULFATE 1 DOSE: .5; 2.5 SOLUTION RESPIRATORY (INHALATION) at 16:01

## 2024-01-04 RX ADMIN — IPRATROPIUM BROMIDE AND ALBUTEROL SULFATE 1 DOSE: .5; 2.5 SOLUTION RESPIRATORY (INHALATION) at 13:00

## 2024-01-04 RX ADMIN — PREDNISONE 40 MG: 20 TABLET ORAL at 16:19

## 2024-01-04 RX ADMIN — ENOXAPARIN SODIUM 40 MG: 100 INJECTION SUBCUTANEOUS at 16:19

## 2024-01-04 ASSESSMENT — LIFESTYLE VARIABLES
HOW OFTEN DO YOU HAVE A DRINK CONTAINING ALCOHOL: NEVER
HOW MANY STANDARD DRINKS CONTAINING ALCOHOL DO YOU HAVE ON A TYPICAL DAY: PATIENT DOES NOT DRINK

## 2024-01-04 ASSESSMENT — PAIN SCALES - GENERAL: PAINLEVEL_OUTOF10: 0

## 2024-01-04 NOTE — PLAN OF CARE
Patient was seen and examined.  Will continue steroid treatment with scheduled nebulizer treatment, this patient used 2 L oxygen at home will order CPAP at night

## 2024-01-04 NOTE — H&P
results for input(s): \"POCGLU\" in the last 72 hours.    CBC with Differential:    Lab Results   Component Value Date/Time    WBC 19.6 01/03/2024 04:32 PM    RBC 4.07 01/03/2024 04:32 PM    HGB 12.9 01/03/2024 04:32 PM    HCT 40.5 01/03/2024 04:32 PM     01/03/2024 04:32 PM    MCV 99.5 01/03/2024 04:32 PM    MCH 31.7 01/03/2024 04:32 PM    MCHC 31.9 01/03/2024 04:32 PM    RDW 13.6 01/03/2024 04:32 PM    METASPCT 1.0 01/06/2023 04:24 AM    LYMPHOPCT 10 01/03/2024 04:32 PM    MONOPCT 4 01/03/2024 04:32 PM    EOSPCT 2 03/18/2019 12:00 AM    BASOPCT 0 01/03/2024 04:32 PM    MONOSABS 0.79 01/03/2024 04:32 PM    LYMPHSABS 1.90 01/03/2024 04:32 PM    EOSABS 0.03 01/03/2024 04:32 PM    BASOSABS 0.02 01/03/2024 04:32 PM     CMP:    Lab Results   Component Value Date/Time     01/03/2024 04:32 PM    K 4.8 01/03/2024 04:32 PM    K 4.7 01/06/2023 05:00 AM    CL 96 01/03/2024 04:32 PM    CO2 28 01/03/2024 04:32 PM    BUN 22 01/03/2024 04:32 PM    CREATININE 0.8 01/03/2024 04:32 PM    GFRAA >60 04/21/2022 03:00 PM    LABGLOM >60 01/03/2024 04:32 PM    GLUCOSE 200 01/03/2024 04:32 PM    PROT 7.5 01/03/2024 04:32 PM    LABALBU 4.0 01/03/2024 04:32 PM    CALCIUM 9.5 01/03/2024 04:32 PM    BILITOT 0.8 01/03/2024 04:32 PM    ALKPHOS 137 01/03/2024 04:32 PM    AST 23 01/03/2024 04:32 PM    ALT 34 01/03/2024 04:32 PM     Last 3 Troponin:    Lab Results   Component Value Date/Time    TROPONINI <0.01 03/09/2020 10:41 PM       Radiology:   CTA PULMONARY W CONTRAST   Final Result   1. No evidence of pulmonary embolism or acute pulmonary abnormality.   2. COPD with respiratory bronchiolitis.   3. New 8 mm nodule left lower lobe may be post inflammatory however   indeterminate.  CT chest recommended 3 months for follow-up   4. Hepatic steatosis.      RECOMMENDATIONS:   Lung rads category 4A follow-up recommended CT chest 3 months with moderate   suspicion         XR CHEST (2 VW)   Final Result   Increased linear markings in the  lower lungs may represent atelectasis or   developing infiltrates.             EKG:       ASSESSMENT:      Active Problems:    * No active hospital problems. *  Resolved Problems:    * No resolved hospital problems. *      PLAN:  Acute on chronic hypoxic resp failure, COPDe- hypoxia 88% on b/l 2L NC. Does not follow Pulm, consider c/s (seen by Dr. Lane on prior admit). Steroid taper, resume 2.5mg daily when taper completes. Productive cough, recent course of doxy?, will add azithro for antiinflammatory effects. Cont nebs  DM2- SSI adjust as needed, ACHS checks, hypoglycemia protocol, hold orals while inpt. Cont statin  HTN- cont CCB, ACEi  Mood- cont sertraline  GERD- cont PPI  Lung nodule, LLL- new 8cm nodule LLL and recs for 3mo f/u imaging    Code Status: Full Code  DVT prophylaxis: lovenox      NOTE: Portions of this report may have been transcribed using voice recognition software. Every effort was made to ensure accuracy; however, inadvertent computerized transcription errors may be present.  Electronically signed by Gaby Marin MD on 1/4/2024 at 3:03 AM

## 2024-01-04 NOTE — PLAN OF CARE
Problem: Discharge Planning  Goal: Discharge to home or other facility with appropriate resources  Outcome: Progressing  Flowsheets (Taken 1/4/2024 1508 by Delfina Guzman, RN)  Discharge to home or other facility with appropriate resources:   Identify barriers to discharge with patient and caregiver   Identify discharge learning needs (meds, wound care, etc)   Refer to discharge planning if patient needs post-hospital services based on physician order or complex needs related to functional status, cognitive ability or social support system   Arrange for needed discharge resources and transportation as appropriate     Problem: Safety - Adult  Goal: Free from fall injury  Outcome: Progressing     Problem: ABCDS Injury Assessment  Goal: Absence of physical injury  Outcome: Progressing

## 2024-01-04 NOTE — PROGRESS NOTES
Database initiated. Patient is A&O independent from home with . States she uses no assistive devices and wears 2 liters oxygen through [facility] medical.

## 2024-01-04 NOTE — ED NOTES
ED to Inpatient Handoff Report    Notified 7W that electronic handoff available and patient ready for transport to room 714.    Safety Risks: None identified    Patient in Restraints: no    Constant Observer or Patient : no    Telemetry Monitoring Ordered: No          Order to transfer to unit without monitor: NA    Last MEWS: 1 Time completed: 1417    Vitals:    01/03/24 2230 01/04/24 0700 01/04/24 1011 01/04/24 1417   BP: 120/60 122/62 136/61 135/67   Pulse: 94 96 88 91   Resp: 20 20 18 20   Temp:   97.9 °F (36.6 °C) 98.5 °F (36.9 °C)   TempSrc:   Oral Oral   SpO2: 98% 96% 99% 96%   Weight:       Height:           Opportunity for questions and clarification was provided.

## 2024-01-05 PROBLEM — J44.1 ACUTE EXACERBATION OF CHRONIC OBSTRUCTIVE PULMONARY DISEASE (COPD) (HCC): Status: ACTIVE | Noted: 2024-01-05

## 2024-01-05 LAB
ANION GAP SERPL CALCULATED.3IONS-SCNC: 8 MMOL/L (ref 7–16)
BASOPHILS # BLD: 0.01 K/UL (ref 0–0.2)
BASOPHILS NFR BLD: 0 % (ref 0–2)
BUN SERPL-MCNC: 34 MG/DL (ref 6–23)
CALCIUM SERPL-MCNC: 9.4 MG/DL (ref 8.6–10.2)
CHLORIDE SERPL-SCNC: 99 MMOL/L (ref 98–107)
CO2 SERPL-SCNC: 29 MMOL/L (ref 22–29)
CREAT SERPL-MCNC: 0.8 MG/DL (ref 0.5–1)
EOSINOPHIL # BLD: 0 K/UL (ref 0.05–0.5)
EOSINOPHILS RELATIVE PERCENT: 0 % (ref 0–6)
ERYTHROCYTE [DISTWIDTH] IN BLOOD BY AUTOMATED COUNT: 13.7 % (ref 11.5–15)
GFR SERPL CREATININE-BSD FRML MDRD: >60 ML/MIN/1.73M2
GLUCOSE BLD-MCNC: 149 MG/DL (ref 74–99)
GLUCOSE BLD-MCNC: 151 MG/DL (ref 74–99)
GLUCOSE BLD-MCNC: 207 MG/DL (ref 74–99)
GLUCOSE BLD-MCNC: 307 MG/DL (ref 74–99)
GLUCOSE SERPL-MCNC: 201 MG/DL (ref 74–99)
HCT VFR BLD AUTO: 34.3 % (ref 34–48)
HGB BLD-MCNC: 10.7 G/DL (ref 11.5–15.5)
IMM GRANULOCYTES # BLD AUTO: 0.06 K/UL (ref 0–0.58)
IMM GRANULOCYTES NFR BLD: 1 % (ref 0–5)
LYMPHOCYTES NFR BLD: 0.71 K/UL (ref 1.5–4)
LYMPHOCYTES RELATIVE PERCENT: 8 % (ref 20–42)
MCH RBC QN AUTO: 31.3 PG (ref 26–35)
MCHC RBC AUTO-ENTMCNC: 31.2 G/DL (ref 32–34.5)
MCV RBC AUTO: 100.3 FL (ref 80–99.9)
MONOCYTES NFR BLD: 0.3 K/UL (ref 0.1–0.95)
MONOCYTES NFR BLD: 4 % (ref 2–12)
NEUTROPHILS NFR BLD: 87 % (ref 43–80)
NEUTS SEG NFR BLD: 7.35 K/UL (ref 1.8–7.3)
PLATELET # BLD AUTO: 144 K/UL (ref 130–450)
PMV BLD AUTO: 11.4 FL (ref 7–12)
POTASSIUM SERPL-SCNC: 5.3 MMOL/L (ref 3.5–5)
RBC # BLD AUTO: 3.42 M/UL (ref 3.5–5.5)
SODIUM SERPL-SCNC: 136 MMOL/L (ref 132–146)
WBC OTHER # BLD: 8.4 K/UL (ref 4.5–11.5)

## 2024-01-05 PROCEDURE — G0378 HOSPITAL OBSERVATION PER HR: HCPCS

## 2024-01-05 PROCEDURE — 6370000000 HC RX 637 (ALT 250 FOR IP): Performed by: STUDENT IN AN ORGANIZED HEALTH CARE EDUCATION/TRAINING PROGRAM

## 2024-01-05 PROCEDURE — 82962 GLUCOSE BLOOD TEST: CPT

## 2024-01-05 PROCEDURE — 6360000002 HC RX W HCPCS: Performed by: STUDENT IN AN ORGANIZED HEALTH CARE EDUCATION/TRAINING PROGRAM

## 2024-01-05 PROCEDURE — 1200000000 HC SEMI PRIVATE

## 2024-01-05 PROCEDURE — 94669 MECHANICAL CHEST WALL OSCILL: CPT

## 2024-01-05 PROCEDURE — 2580000003 HC RX 258: Performed by: STUDENT IN AN ORGANIZED HEALTH CARE EDUCATION/TRAINING PROGRAM

## 2024-01-05 PROCEDURE — 94660 CPAP INITIATION&MGMT: CPT

## 2024-01-05 PROCEDURE — 99232 SBSQ HOSP IP/OBS MODERATE 35: CPT | Performed by: STUDENT IN AN ORGANIZED HEALTH CARE EDUCATION/TRAINING PROGRAM

## 2024-01-05 PROCEDURE — 96375 TX/PRO/DX INJ NEW DRUG ADDON: CPT

## 2024-01-05 PROCEDURE — 96372 THER/PROPH/DIAG INJ SC/IM: CPT

## 2024-01-05 PROCEDURE — 5A09457 ASSISTANCE WITH RESPIRATORY VENTILATION, 24-96 CONSECUTIVE HOURS, CONTINUOUS POSITIVE AIRWAY PRESSURE: ICD-10-PCS | Performed by: STUDENT IN AN ORGANIZED HEALTH CARE EDUCATION/TRAINING PROGRAM

## 2024-01-05 PROCEDURE — 94640 AIRWAY INHALATION TREATMENT: CPT

## 2024-01-05 PROCEDURE — 85025 COMPLETE CBC W/AUTO DIFF WBC: CPT

## 2024-01-05 PROCEDURE — 80048 BASIC METABOLIC PNL TOTAL CA: CPT

## 2024-01-05 RX ORDER — METHYLPREDNISOLONE SODIUM SUCCINATE 40 MG/ML
40 INJECTION, POWDER, LYOPHILIZED, FOR SOLUTION INTRAMUSCULAR; INTRAVENOUS EVERY 12 HOURS
Status: COMPLETED | OUTPATIENT
Start: 2024-01-05 | End: 2024-01-06

## 2024-01-05 RX ADMIN — IPRATROPIUM BROMIDE AND ALBUTEROL SULFATE 1 DOSE: .5; 2.5 SOLUTION RESPIRATORY (INHALATION) at 08:13

## 2024-01-05 RX ADMIN — METHYLPREDNISOLONE SODIUM SUCCINATE 40 MG: 40 INJECTION INTRAMUSCULAR; INTRAVENOUS at 23:21

## 2024-01-05 RX ADMIN — SERTRALINE HYDROCHLORIDE 50 MG: 50 TABLET ORAL at 08:32

## 2024-01-05 RX ADMIN — INSULIN LISPRO 6 UNITS: 100 INJECTION, SOLUTION INTRAVENOUS; SUBCUTANEOUS at 17:29

## 2024-01-05 RX ADMIN — METHYLPREDNISOLONE SODIUM SUCCINATE 40 MG: 40 INJECTION INTRAMUSCULAR; INTRAVENOUS at 08:39

## 2024-01-05 RX ADMIN — ATORVASTATIN CALCIUM 10 MG: 10 TABLET, FILM COATED ORAL at 08:32

## 2024-01-05 RX ADMIN — INSULIN LISPRO 2 UNITS: 100 INJECTION, SOLUTION INTRAVENOUS; SUBCUTANEOUS at 12:05

## 2024-01-05 RX ADMIN — ACETAMINOPHEN 650 MG: 325 TABLET ORAL at 08:32

## 2024-01-05 RX ADMIN — ENOXAPARIN SODIUM 40 MG: 100 INJECTION SUBCUTANEOUS at 08:32

## 2024-01-05 RX ADMIN — IPRATROPIUM BROMIDE AND ALBUTEROL SULFATE 1 DOSE: .5; 2.5 SOLUTION RESPIRATORY (INHALATION) at 19:25

## 2024-01-05 RX ADMIN — AZITHROMYCIN 500 MG: 250 TABLET, FILM COATED ORAL at 08:39

## 2024-01-05 RX ADMIN — IPRATROPIUM BROMIDE AND ALBUTEROL SULFATE 1 DOSE: .5; 2.5 SOLUTION RESPIRATORY (INHALATION) at 11:58

## 2024-01-05 RX ADMIN — AMLODIPINE BESYLATE 5 MG: 5 TABLET ORAL at 08:32

## 2024-01-05 RX ADMIN — PANTOPRAZOLE SODIUM 40 MG: 40 TABLET, DELAYED RELEASE ORAL at 06:10

## 2024-01-05 RX ADMIN — SODIUM CHLORIDE, PRESERVATIVE FREE 10 ML: 5 INJECTION INTRAVENOUS at 08:33

## 2024-01-05 RX ADMIN — LISINOPRIL 20 MG: 10 TABLET ORAL at 08:32

## 2024-01-05 RX ADMIN — IPRATROPIUM BROMIDE AND ALBUTEROL SULFATE 1 DOSE: .5; 2.5 SOLUTION RESPIRATORY (INHALATION) at 16:29

## 2024-01-05 ASSESSMENT — PAIN SCALES - GENERAL: PAINLEVEL_OUTOF10: 0

## 2024-01-05 NOTE — PROGRESS NOTES
4 Eyes Skin Assessment     NAME:  Asiya Mendez  YOB: 1955  MEDICAL RECORD NUMBER:  [id number]    The patient is being assessed for  Admission    I agree that at least one RN has performed a thorough Head to Toe Skin Assessment on the patient. ALL assessment sites listed below have been assessed.      Areas assessed by both nurses:    Head, Face, Ears, Shoulders, Back, Chest, Arms, Elbows, Hands, Sacrum. Buttock, Coccyx, Ischium, and Legs. Feet and Heels  Soft and boggy heels.   Thickened toe nails.         Does the Patient have a Wound? No noted wound(s)       Dom Prevention initiated by RN: Yes  Wound Care Orders initiated by RN: No    Pressure Injury (Stage 3,4, Unstageable, DTI, NWPT, and Complex wounds) if present, place Wound referral order by RN under : No    New Ostomies, if present place, Ostomy referral order under : No     Nurse 1 eSignature: Electronically signed by Alysia Herr RN on 1/4/24 at 7:54 PM EST    **SHARE this note so that the co-signing nurse can place an eSignature**    Nurse 2 eSignature: Electronically signed by April Jacobs RN on 1/4/24 at 8:07 PM EST

## 2024-01-05 NOTE — PLAN OF CARE

## 2024-01-05 NOTE — CARE COORDINATION
Transition of Care-. Met with patient at the bedside, She lives with her  Brandin live in a trailer with ramp to enter. Has wc, electric wc, walker, nebulizer, o2 and cpap. She is currently on 3lnc. Confirmed with Sheeba from [facility] [phone] that patient's home o2 order reads 2-3lnc cont and CPAP (from [employer]) at hs with 3lnc bleed in. She has no manolo/hcc history . Patient does not anticipate any discharge needs.     Meche AYOUBN, RN  [facility]

## 2024-01-06 VITALS
SYSTOLIC BLOOD PRESSURE: 147 MMHG | BODY MASS INDEX: 35.97 KG/M2 | WEIGHT: 203 LBS | RESPIRATION RATE: 18 BRPM | OXYGEN SATURATION: 93 % | DIASTOLIC BLOOD PRESSURE: 61 MMHG | HEART RATE: 93 BPM | TEMPERATURE: 97.9 F | HEIGHT: 63 IN

## 2024-01-06 LAB
ANION GAP SERPL CALCULATED.3IONS-SCNC: 11 MMOL/L (ref 7–16)
BASOPHILS # BLD: 0.01 K/UL (ref 0–0.2)
BASOPHILS NFR BLD: 0 % (ref 0–2)
BUN SERPL-MCNC: 29 MG/DL (ref 6–23)
CALCIUM SERPL-MCNC: 9.8 MG/DL (ref 8.6–10.2)
CHLORIDE SERPL-SCNC: 97 MMOL/L (ref 98–107)
CO2 SERPL-SCNC: 26 MMOL/L (ref 22–29)
CREAT SERPL-MCNC: 0.9 MG/DL (ref 0.5–1)
EOSINOPHIL # BLD: 0 K/UL (ref 0.05–0.5)
EOSINOPHILS RELATIVE PERCENT: 0 % (ref 0–6)
ERYTHROCYTE [DISTWIDTH] IN BLOOD BY AUTOMATED COUNT: 13.4 % (ref 11.5–15)
GFR SERPL CREATININE-BSD FRML MDRD: >60 ML/MIN/1.73M2
GLUCOSE BLD-MCNC: 249 MG/DL (ref 74–99)
GLUCOSE BLD-MCNC: 279 MG/DL (ref 74–99)
GLUCOSE SERPL-MCNC: 214 MG/DL (ref 74–99)
HCT VFR BLD AUTO: 36.3 % (ref 34–48)
HGB BLD-MCNC: 11.4 G/DL (ref 11.5–15.5)
IMM GRANULOCYTES # BLD AUTO: 0.06 K/UL (ref 0–0.58)
IMM GRANULOCYTES NFR BLD: 1 % (ref 0–5)
LYMPHOCYTES NFR BLD: 0.64 K/UL (ref 1.5–4)
LYMPHOCYTES RELATIVE PERCENT: 6 % (ref 20–42)
MCH RBC QN AUTO: 31.8 PG (ref 26–35)
MCHC RBC AUTO-ENTMCNC: 31.4 G/DL (ref 32–34.5)
MCV RBC AUTO: 101.4 FL (ref 80–99.9)
MONOCYTES NFR BLD: 0.27 K/UL (ref 0.1–0.95)
MONOCYTES NFR BLD: 2 % (ref 2–12)
NEUTROPHILS NFR BLD: 91 % (ref 43–80)
NEUTS SEG NFR BLD: 10.33 K/UL (ref 1.8–7.3)
PLATELET # BLD AUTO: 175 K/UL (ref 130–450)
PMV BLD AUTO: 11.5 FL (ref 7–12)
POTASSIUM SERPL-SCNC: 5 MMOL/L (ref 3.5–5)
RBC # BLD AUTO: 3.58 M/UL (ref 3.5–5.5)
SODIUM SERPL-SCNC: 134 MMOL/L (ref 132–146)
WBC OTHER # BLD: 11.3 K/UL (ref 4.5–11.5)

## 2024-01-06 PROCEDURE — 2580000003 HC RX 258: Performed by: STUDENT IN AN ORGANIZED HEALTH CARE EDUCATION/TRAINING PROGRAM

## 2024-01-06 PROCEDURE — 82962 GLUCOSE BLOOD TEST: CPT

## 2024-01-06 PROCEDURE — 6370000000 HC RX 637 (ALT 250 FOR IP): Performed by: STUDENT IN AN ORGANIZED HEALTH CARE EDUCATION/TRAINING PROGRAM

## 2024-01-06 PROCEDURE — 94660 CPAP INITIATION&MGMT: CPT

## 2024-01-06 PROCEDURE — 85025 COMPLETE CBC W/AUTO DIFF WBC: CPT

## 2024-01-06 PROCEDURE — 94640 AIRWAY INHALATION TREATMENT: CPT

## 2024-01-06 PROCEDURE — 94669 MECHANICAL CHEST WALL OSCILL: CPT

## 2024-01-06 PROCEDURE — 2700000000 HC OXYGEN THERAPY PER DAY

## 2024-01-06 PROCEDURE — 99239 HOSP IP/OBS DSCHRG MGMT >30: CPT | Performed by: STUDENT IN AN ORGANIZED HEALTH CARE EDUCATION/TRAINING PROGRAM

## 2024-01-06 PROCEDURE — 6360000002 HC RX W HCPCS: Performed by: STUDENT IN AN ORGANIZED HEALTH CARE EDUCATION/TRAINING PROGRAM

## 2024-01-06 PROCEDURE — 80048 BASIC METABOLIC PNL TOTAL CA: CPT

## 2024-01-06 RX ORDER — AZITHROMYCIN 500 MG/1
500 TABLET, FILM COATED ORAL DAILY
Qty: 1 TABLET | Refills: 0 | Status: SHIPPED | OUTPATIENT
Start: 2024-01-07 | End: 2024-01-08

## 2024-01-06 RX ORDER — PREDNISONE 20 MG/1
TABLET ORAL
Qty: 15 TABLET | Refills: 0 | Status: SHIPPED | OUTPATIENT
Start: 2024-01-06 | End: 2024-01-18

## 2024-01-06 RX ADMIN — PANTOPRAZOLE SODIUM 40 MG: 40 TABLET, DELAYED RELEASE ORAL at 06:18

## 2024-01-06 RX ADMIN — IPRATROPIUM BROMIDE AND ALBUTEROL SULFATE 1 DOSE: .5; 2.5 SOLUTION RESPIRATORY (INHALATION) at 06:09

## 2024-01-06 RX ADMIN — AZITHROMYCIN 500 MG: 250 TABLET, FILM COATED ORAL at 08:20

## 2024-01-06 RX ADMIN — IPRATROPIUM BROMIDE AND ALBUTEROL SULFATE 1 DOSE: .5; 2.5 SOLUTION RESPIRATORY (INHALATION) at 00:05

## 2024-01-06 RX ADMIN — SODIUM CHLORIDE, PRESERVATIVE FREE 10 ML: 5 INJECTION INTRAVENOUS at 08:21

## 2024-01-06 RX ADMIN — METHYLPREDNISOLONE SODIUM SUCCINATE 40 MG: 40 INJECTION INTRAMUSCULAR; INTRAVENOUS at 08:21

## 2024-01-06 RX ADMIN — LISINOPRIL 20 MG: 10 TABLET ORAL at 08:21

## 2024-01-06 RX ADMIN — INSULIN LISPRO 2 UNITS: 100 INJECTION, SOLUTION INTRAVENOUS; SUBCUTANEOUS at 08:28

## 2024-01-06 RX ADMIN — ATORVASTATIN CALCIUM 10 MG: 10 TABLET, FILM COATED ORAL at 08:21

## 2024-01-06 RX ADMIN — ENOXAPARIN SODIUM 40 MG: 100 INJECTION SUBCUTANEOUS at 08:20

## 2024-01-06 RX ADMIN — IPRATROPIUM BROMIDE AND ALBUTEROL SULFATE 1 DOSE: .5; 2.5 SOLUTION RESPIRATORY (INHALATION) at 13:21

## 2024-01-06 RX ADMIN — INSULIN LISPRO 4 UNITS: 100 INJECTION, SOLUTION INTRAVENOUS; SUBCUTANEOUS at 12:34

## 2024-01-06 RX ADMIN — AMLODIPINE BESYLATE 5 MG: 5 TABLET ORAL at 08:20

## 2024-01-06 RX ADMIN — SERTRALINE HYDROCHLORIDE 50 MG: 50 TABLET ORAL at 08:21

## 2024-01-06 NOTE — PLAN OF CARE
Problem: Discharge Planning  Goal: Discharge to home or other facility with appropriate resources  1/6/2024 0339 by April Jeong RN  Outcome: Adequate for Discharge  1/5/2024 1345 by Allyn Epperson RN  Outcome: Progressing     Problem: Safety - Adult  Goal: Free from fall injury  1/6/2024 0339 by April Jeong RN  Outcome: Adequate for Discharge  1/5/2024 1345 by Allyn Epperson RN  Outcome: Progressing     Problem: ABCDS Injury Assessment  Goal: Absence of physical injury  1/6/2024 0339 by April Jeong RN  Outcome: Adequate for Discharge  1/5/2024 1345 by Allyn Epperson RN  Outcome: Progressing     Problem: Pain  Goal: Verbalizes/displays adequate comfort level or baseline comfort level  1/6/2024 0339 by April Jeong RN  Outcome: Adequate for Discharge  1/5/2024 1345 by Allyn Epperson RN  Outcome: Progressing     Problem: Chronic Conditions and Co-morbidities  Goal: Patient's chronic conditions and co-morbidity symptoms are monitored and maintained or improved  1/6/2024 0339 by April Jeong RN  Outcome: Adequate for Discharge  1/5/2024 1345 by Allyn Epperson RN  Outcome: Progressing

## 2024-01-06 NOTE — PROGRESS NOTES
Discharge instructions were reviewed with patient and family at bedside, all questions answered. IV was removed. Patient had all belongings intact at time of discharge.

## 2024-01-06 NOTE — DISCHARGE SUMMARY
HISTORY: copd cough TECHNOLOGIST PROVIDED HISTORY: Reason for exam:->copd cough FINDINGS: There are increased linear markings in the lower lungs.  The upper lungs are clear.  The heart is not enlarged.  There is no pneumothorax or pleural effusion.     Increased linear markings in the lower lungs may represent atelectasis or developing infiltrates.       Patient Instructions:      Medication List        START taking these medications      azithromycin 500 MG tablet  Commonly known as: ZITHROMAX  Take 1 tablet by mouth daily for 1 dose  Start taking on: January 7, 2024            CHANGE how you take these medications      * predniSONE 2.5 MG tablet  Commonly known as: DELTASONE  What changed: Another medication with the same name was added. Make sure you understand how and when to take each.     * predniSONE 20 MG tablet  Commonly known as: DELTASONE  Take 2 tablets by mouth daily for 3 days, THEN 1.5 tablets daily for 3 days, THEN 1 tablet daily for 3 days, THEN 0.5 tablets daily for 3 days.  Start taking on: January 6, 2024  What changed: You were already taking a medication with the same name, and this prescription was added. Make sure you understand how and when to take each.           * This list has 2 medication(s) that are the same as other medications prescribed for you. Read the directions carefully, and ask your doctor or other care provider to review them with you.                CONTINUE taking these medications      amLODIPine 5 MG tablet  Commonly known as: NORVASC     CPAP Machine Misc     glipiZIDE 5 MG tablet  Commonly known as: GLUCOTROL     lisinopril 20 MG tablet  Commonly known as: PRINIVIL;ZESTRIL     loratadine 10 MG capsule  Commonly known as: CLARITIN     metFORMIN 500 MG extended release tablet  Commonly known as: GLUCOPHAGE-XR     omeprazole 20 MG delayed release capsule  Commonly known as: PRILOSEC     OXYGEN     Pulmicort 0.5 MG/2ML nebulizer suspension  Generic drug: budesonide      sertraline 50 MG tablet  Commonly known as: ZOLOFT     simvastatin 20 MG tablet  Commonly known as: ZOCOR     Ventolin  (90 Base) MCG/ACT inhaler  Generic drug: albuterol sulfate HFA               Where to Get Your Medications        These medications were sent to [id number], [place] - [address] -  [phone] - F [phone] [address] [facility] [address]      Phone: [phone]   azithromycin 500 MG tablet  predniSONE 20 MG tablet           Note that more than 30 minutes was spent in preparing discharge papers, discussing discharge with patient, medication review, etc.    NOTE: Portions of this report may have been transcribed using voice recognition software. Every effort was made to ensure accuracy; however, inadvertent computerized transcription errors may be present.    Signed:  Electronically signed by Gaby Marin MD on 1/6/2024 at 1:05 PM

## 2024-01-06 NOTE — PLAN OF CARE
Problem: Discharge Planning  Goal: Discharge to home or other facility with appropriate resources  1/6/2024 1338 by Allyn Epperson RN  Outcome: Adequate for Discharge  1/6/2024 0339 by April Jeong RN  Outcome: Adequate for Discharge     Problem: Safety - Adult  Goal: Free from fall injury  1/6/2024 1338 by Allyn Epperson RN  Outcome: Adequate for Discharge  1/6/2024 0339 by April Jeong RN  Outcome: Adequate for Discharge     Problem: ABCDS Injury Assessment  Goal: Absence of physical injury  1/6/2024 1338 by Allyn Epperson RN  Outcome: Adequate for Discharge  1/6/2024 0339 by April Jeong RN  Outcome: Adequate for Discharge     Problem: Pain  Goal: Verbalizes/displays adequate comfort level or baseline comfort level  1/6/2024 1338 by Allyn Epperson RN  Outcome: Adequate for Discharge  1/6/2024 0339 by April Jeong RN  Outcome: Adequate for Discharge     Problem: Chronic Conditions and Co-morbidities  Goal: Patient's chronic conditions and co-morbidity symptoms are monitored and maintained or improved  1/6/2024 1338 by Allyn Epperson RN  Outcome: Adequate for Discharge  1/6/2024 0339 by April Jeong RN  Outcome: Adequate for Discharge

## 2024-01-07 LAB
MICROORGANISM SPEC CULT: NORMAL
MICROORGANISM SPEC CULT: NORMAL
SERVICE CMNT-IMP: NORMAL
SERVICE CMNT-IMP: NORMAL
SPECIMEN DESCRIPTION: NORMAL
SPECIMEN DESCRIPTION: NORMAL
